# Patient Record
Sex: FEMALE | Race: WHITE | NOT HISPANIC OR LATINO | Employment: UNEMPLOYED | URBAN - METROPOLITAN AREA
[De-identification: names, ages, dates, MRNs, and addresses within clinical notes are randomized per-mention and may not be internally consistent; named-entity substitution may affect disease eponyms.]

---

## 2019-05-14 ENCOUNTER — TELEPHONE (OUTPATIENT)
Dept: SCHEDULING | Facility: IMAGING CENTER | Age: 24
End: 2019-05-14

## 2019-05-28 ENCOUNTER — HOSPITAL ENCOUNTER (OUTPATIENT)
Dept: LAB | Facility: MEDICAL CENTER | Age: 24
End: 2019-05-28
Attending: INTERNAL MEDICINE
Payer: COMMERCIAL

## 2019-05-28 ENCOUNTER — OFFICE VISIT (OUTPATIENT)
Dept: MEDICAL GROUP | Facility: MEDICAL CENTER | Age: 24
End: 2019-05-28
Attending: INTERNAL MEDICINE
Payer: COMMERCIAL

## 2019-05-28 VITALS
DIASTOLIC BLOOD PRESSURE: 68 MMHG | TEMPERATURE: 97.3 F | HEIGHT: 65 IN | OXYGEN SATURATION: 97 % | SYSTOLIC BLOOD PRESSURE: 110 MMHG | BODY MASS INDEX: 38.65 KG/M2 | RESPIRATION RATE: 16 BRPM | HEART RATE: 70 BPM | WEIGHT: 232 LBS

## 2019-05-28 DIAGNOSIS — E66.9 OBESITY (BMI 35.0-39.9 WITHOUT COMORBIDITY): ICD-10-CM

## 2019-05-28 DIAGNOSIS — F41.9 ANXIETY: ICD-10-CM

## 2019-05-28 DIAGNOSIS — L30.9 ECZEMA, UNSPECIFIED TYPE: ICD-10-CM

## 2019-05-28 DIAGNOSIS — E55.9 VITAMIN D DEFICIENCY: ICD-10-CM

## 2019-05-28 DIAGNOSIS — E11.9 TYPE 2 DIABETES MELLITUS WITHOUT COMPLICATION, WITHOUT LONG-TERM CURRENT USE OF INSULIN (HCC): ICD-10-CM

## 2019-05-28 DIAGNOSIS — F33.1 MODERATE EPISODE OF RECURRENT MAJOR DEPRESSIVE DISORDER (HCC): ICD-10-CM

## 2019-05-28 DIAGNOSIS — E28.2 PCOS (POLYCYSTIC OVARIAN SYNDROME): ICD-10-CM

## 2019-05-28 DIAGNOSIS — J45.20 MILD INTERMITTENT ASTHMA WITHOUT COMPLICATION: ICD-10-CM

## 2019-05-28 DIAGNOSIS — G56.03 BILATERAL CARPAL TUNNEL SYNDROME: ICD-10-CM

## 2019-05-28 DIAGNOSIS — E78.1 HYPERTRIGLYCERIDEMIA: ICD-10-CM

## 2019-05-28 DIAGNOSIS — K21.9 GASTROESOPHAGEAL REFLUX DISEASE, ESOPHAGITIS PRESENCE NOT SPECIFIED: ICD-10-CM

## 2019-05-28 LAB
CREAT UR-MCNC: 183.5 MG/DL
HBA1C MFR BLD: 7.8 % (ref 0–5.6)
INT CON NEG: NEGATIVE
INT CON POS: POSITIVE
MICROALBUMIN UR-MCNC: 1.9 MG/DL
MICROALBUMIN/CREAT UR: 10 MG/G (ref 0–30)

## 2019-05-28 PROCEDURE — 80053 COMPREHEN METABOLIC PANEL: CPT

## 2019-05-28 PROCEDURE — 99204 OFFICE O/P NEW MOD 45 MIN: CPT | Performed by: INTERNAL MEDICINE

## 2019-05-28 PROCEDURE — 83036 HEMOGLOBIN GLYCOSYLATED A1C: CPT | Performed by: INTERNAL MEDICINE

## 2019-05-28 PROCEDURE — 36415 COLL VENOUS BLD VENIPUNCTURE: CPT

## 2019-05-28 PROCEDURE — 82043 UR ALBUMIN QUANTITATIVE: CPT

## 2019-05-28 PROCEDURE — 82570 ASSAY OF URINE CREATININE: CPT

## 2019-05-28 PROCEDURE — 82306 VITAMIN D 25 HYDROXY: CPT

## 2019-05-28 PROCEDURE — 80061 LIPID PANEL: CPT

## 2019-05-28 RX ORDER — LANCETS 30 GAUGE
1 EACH MISCELLANEOUS DAILY
Qty: 100 EACH | Refills: 3 | Status: SHIPPED | OUTPATIENT
Start: 2019-05-28 | End: 2021-03-10

## 2019-05-28 RX ORDER — ALBUTEROL SULFATE 90 UG/1
2 AEROSOL, METERED RESPIRATORY (INHALATION) EVERY 6 HOURS PRN
Qty: 8.5 G | Refills: 3 | Status: SHIPPED | OUTPATIENT
Start: 2019-05-28 | End: 2021-03-10

## 2019-05-28 RX ORDER — RANITIDINE 150 MG/1
150 TABLET ORAL 2 TIMES DAILY PRN
Qty: 30 TAB | Refills: 3 | Status: SHIPPED | OUTPATIENT
Start: 2019-05-28 | End: 2021-03-10

## 2019-05-28 RX ORDER — METFORMIN HYDROCHLORIDE 500 MG/1
500 TABLET, EXTENDED RELEASE ORAL DAILY
Qty: 30 TAB | Refills: 5 | Status: SHIPPED | OUTPATIENT
Start: 2019-05-28 | End: 2021-03-10 | Stop reason: SDUPTHER

## 2019-05-28 ASSESSMENT — PAIN SCALES - GENERAL: PAINLEVEL: NO PAIN

## 2019-05-28 ASSESSMENT — PATIENT HEALTH QUESTIONNAIRE - PHQ9
CLINICAL INTERPRETATION OF PHQ2 SCORE: 2
5. POOR APPETITE OR OVEREATING: 1 - SEVERAL DAYS
SUM OF ALL RESPONSES TO PHQ QUESTIONS 1-9: 8

## 2019-05-28 NOTE — ASSESSMENT & PLAN NOTE
She reports a history of depression.  She states that a week ago, she was feeling suicidal and almost checked herself into the hospital.  She states that she can flip-flop easily.  Some days she will feel normal, like today, and the next day she will feel suicidal.  She currently denies suicidal ideation.  She is not taking any medication for her mental health currently.  She has never been formally evaluated by a psychiatrist.

## 2019-05-28 NOTE — ASSESSMENT & PLAN NOTE
She reports mild asthma symptoms and usually needs her inhaler about once a week.  She states that it has gotten worse since moving from [place] to [place].  She suffers from numerous allergies but does not know what specifically she is allergic to.  She is requesting allergy testing.

## 2019-05-28 NOTE — ASSESSMENT & PLAN NOTE
She states that in the past, she was taking vitamin D 50,000 units weekly.  She has been off of this for several months.

## 2019-05-28 NOTE — ASSESSMENT & PLAN NOTE
She reports daily heartburn symptoms.  She has been taking omeprazole 20 mg in the mornings.  States that this controls her symptoms well.  Has not been able to purchase it lately due to difficulty affording it.  She has also tried Tums in the past which did not help.  She has never tried ranitidine.

## 2019-05-28 NOTE — ASSESSMENT & PLAN NOTE
She reports dealing with severe anxiety for several years.  States that it is difficult for her to go out in public.  She gets a lot of social anxiety and sometimes can do shopping.  Her previous PCP started her on sertraline but she did not take this long enough to notice an improvement.  She has never seen psychiatry or therapist.  She has not tried any other medication besides the sertraline.

## 2019-05-28 NOTE — ASSESSMENT & PLAN NOTE
She reports a history of eczema develops intermittently in the groin region.  She has used hydrocortisone cream 2.5% in the past.  She is requesting a refill of this.  She does not use it daily.

## 2019-05-28 NOTE — PROGRESS NOTES
Anisha Tristan is a 24 y.o. female here for diabetes, anxiety, hyperlipidemia, depression, numerous chronic medical problems as below, establish care  HPI:    Anxiety  She reports dealing with severe anxiety for several years.  States that it is difficult for her to go out in public.  She gets a lot of social anxiety and sometimes can do shopping.  Her previous PCP started her on sertraline but she did not take this long enough to notice an improvement.  She has never seen psychiatry or therapist.  She has not tried any other medication besides the sertraline.    Mild intermittent asthma without complication  She reports mild asthma symptoms and usually needs her inhaler about once a week.  She states that it has gotten worse since moving from [place] to [place].  She suffers from numerous allergies but does not know what specifically she is allergic to.  She is requesting allergy testing.      GERD (gastroesophageal reflux disease)  She reports daily heartburn symptoms.  She has been taking omeprazole 20 mg in the mornings.  States that this controls her symptoms well.  Has not been able to purchase it lately due to difficulty affording it.  She has also tried Tums in the past which did not help.  She has never tried ranitidine.      Bilateral carpal tunnel syndrome  She has a history of bilateral carpal tunnel syndrome and has had surgery on both wrists.  She reports no improvement after surgery.  She reports persistent numbness and tingling in her hands that is worse in the mornings.  In the past she has used wrist braces which have helped.  She does not currently have them.  She has never tried carpal tunnel injections.  She denies significant hand weakness.    Moderate episode of recurrent major depressive disorder (HCC)  She reports a history of depression.  She states that a week ago, she was feeling suicidal and almost checked herself into the hospital.  She states that she can flip-flop easily.  Some days she  will feel normal, like today, and the next day she will feel suicidal.  She currently denies suicidal ideation.  She is not taking any medication for her mental health currently.  She has never been formally evaluated by a psychiatrist.    PCOS (polycystic ovarian syndrome)  She has a history of PCOS with difficulty with fertility.  She is interested in regulating her menstrual cycles and also becoming pregnant.  She states they have been trying for 5 years and have been unsuccessful.    Type 2 diabetes mellitus without complication, without long-term current use of insulin ([facility])  She was diagnosed with diabetes several years ago.  She was previously taking metformin 500 mg daily.  She has been off this medication for several months now.  She does not test blood sugars routinely.  She denies polyuria and polydipsia.    Hypertriglyceridemia  She states that her previous PCP had her on gemfibrozil 600 mg twice daily for this.  She has not been taking it recently.  She has not had lipid panel checked recently.  No history of pancreatitis.    Vitamin D deficiency  She states that in the past, she was taking vitamin D 50,000 units weekly.  She has been off of this for several months.    Obesity (BMI 35.0-39.9 without comorbidity) ([facility])  She reports difficulty losing weight related to inability to afford healthy foods.  She denies soda or fast food intake.  She tries to eat diabetic friendly foods but has difficulty affording these.  She has not been to any of the local food rosales.  She is interested in a prescription for diabetic food from the food rosales.    Eczema  She reports a history of eczema develops intermittently in the groin region.  She has used hydrocortisone cream 2.5% in the past.  She is requesting a refill of this.  She does not use it daily.    Current medicines (including changes today)  Current Outpatient Prescriptions   Medication Sig Dispense Refill   • metFORMIN ER (GLUCOPHAGE XR) 500 MG TABLET SR 24  HR Take 1 Tab by mouth every day. 30 Tab 5   • hydrocortisone 2.5 % Cream topical cream Apply thin layer to areas of eczema daily as needed 30 g 3   • albuterol 108 (90 Base) MCG/ACT Aero Soln inhalation aerosol Inhale 2 Puffs by mouth every 6 hours as needed for Shortness of Breath. 8.5 g 3   • Blood Glucose Monitoring Suppl (TRUE METRIX METER) w/Device Kit 1 Kit by Does not apply route Once for 1 dose. 1 Kit 0   • glucose blood strip Use to test blood sugar daily and as needed for symptoms of high or low sugar 50 Strip 5   • Lancets 1 Units by Does not apply route every day. Lancets order: Lancets for True Metrix meter. Sig: use 1 daily and prn ssx high or low sugar 100 Each 3   • raNITidine (ZANTAC) 150 MG Tab Take 1 Tab by mouth 2 times a day as needed for Heartburn. 30 Tab 3     No current facility-administered medications for this visit.      She  has a past medical history of Asthma; Carpal tunnel syndrome; Diabetes (HCC); Hyperlipidemia; PCOS (polycystic ovarian syndrome); and Vitamin D deficiency.  She  has a past surgical history that includes carpal tunnel release (Bilateral) and ankle orif (Right, 2010).  Social History   Substance Use Topics   • Smoking status: Former Smoker     Packs/day: 0.10     Years: 15.00     Types: Cigarettes     Quit date: 10/28/2018   • Smokeless tobacco: Never Used   • Alcohol use Yes      Comment: 1 glass of wine every 2 months     Social History     Social History Narrative   • No narrative on file     Family History   Problem Relation Age of Onset   • Cancer Mother         throat   • Alcohol/Drug Mother    • Cancer Maternal Uncle    • Diabetes Maternal Grandmother    • Hypertension Maternal Grandmother    • Heart Disease Neg Hx    • Stroke Neg Hx          ROS  As above in HPI  All other systems reviewed and are negative     Objective:     Vitals:    05/28/19 0756   BP: 110/68   Pulse: 70   Resp: 16   Temp: 36.3 °C (97.3 °F)   SpO2: 97%     Body mass index is 38.61  kg/m².  Physical Exam:    Constitutional: Alert, no distress.  Skin: Warm, dry, good turgor, no rashes in visible areas, hirsutism.  Eye: Equal, round and reactive, conjunctiva clear, lids normal.  ENMT: Lips without lesions, fair dentition, oropharynx clear, TM's clear bilaterally.  Neck: Trachea midline, no masses, no thyromegaly. No cervical or supraclavicular lymphadenopathy.  Respiratory: Unlabored respiratory effort, lungs clear to auscultation, no wheezes, no ronchi.  Cardiovascular: Regular rate and rhythm, no murmurs appreciated, no lower extremity edema.  Abdomen: Soft, non-tender, no masses, no hepatosplenomegaly.  Psych: Alert and oriented x3, normal affect and mood.  MSK: Positive Tinel and Phalen bilaterally, 5/5  strength bilateral    Results:  POC A1c in clinic today was 7.8    Assessment and Plan:   The following treatment plan was discussed    1. Type 2 diabetes mellitus without complication, without long-term current use of insulin (HCC)  She has been off of her medications for several months.  We will restart her previous metformin dose.  - metFORMIN ER (GLUCOPHAGE XR) 500 MG TABLET SR 24 HR; Take 1 Tab by mouth every day.  Dispense: 30 Tab; Refill: 5  - Blood Glucose Monitoring Suppl (TRUE METRIX METER) w/Device Kit; 1 Kit by Does not apply route Once for 1 dose.  Dispense: 1 Kit; Refill: 0  - glucose blood strip; Use to test blood sugar daily and as needed for symptoms of high or low sugar  Dispense: 50 Strip; Refill: 5  - Lancets; 1 Units by Does not apply route every day. Lancets order: Lancets for True Metrix meter. Sig: use 1 daily and prn ssx high or low sugar  Dispense: 100 Each; Refill: 3  - Lipid Profile; Future  - MICROALBUMIN CREAT RATIO URINE; Future  - Comp Metabolic Panel; Future  - POCT Hemoglobin A1C  -DM food prescription given    2. Anxiety  Uncontrolled, she would like to get involved with a therapist as well as a psychiatrist.  I have placed a referral today.  - REFERRAL  TO PSYCHIATRY  - REFERRAL TO PSYCHOLOGY    3. PCOS (polycystic ovarian syndrome)  We did not get to discuss this in detail today.  We did talk about OCPs as a means to control PCOS symptoms however given that she wants to become pregnant, would not recommend this at this time.  Could consider GYN referral in the future if she desires    4. Mild intermittent asthma without complication  Stable, controlled with current medications.  We will get her over to allergy so that she can get extensive allergy testing to try to improve her asthma symptoms.  - albuterol 108 (90 Base) MCG/ACT Aero Soln inhalation aerosol; Inhale 2 Puffs by mouth every 6 hours as needed for Shortness of Breath.  Dispense: 8.5 g; Refill: 3  - REFERRAL TO ALLERGY    5. Gastroesophageal reflux disease, esophagitis presence not specified  Was previously taking Prilosec daily.  We will switch her to ranitidine  - raNITidine (ZANTAC) 150 MG Tab; Take 1 Tab by mouth 2 times a day as needed for Heartburn.  Dispense: 30 Tab; Refill: 3    6. Moderate episode of recurrent major depressive disorder (HCC)  Uncontrolled.  Question whether her symptoms are solely due to depression versus other underlying psychiatric diagnosis.  Referral to psychiatry for assistance with correct diagnosis given atypical symptoms.  We will hold off on medication for now.  - REFERRAL TO PSYCHIATRY  - REFERRAL TO PSYCHOLOGY    7. Vitamin D deficiency  Currently off of supplementation  - VITAMIN D,25 HYDROXY; Future    8. Bilateral carpal tunnel syndrome  Uncontrolled, status post failed carpal tunnel surgery.  We will have her start doing wrist braces  -Bilateral wrist braces given today for nocturnal use    9. Hypertriglyceridemia  Currently off of gemfibrozil.  We will obtain updated labs.    10. Obesity (BMI 35.0-39.9 without comorbidity)  - Patient identified as having weight management issue.  Appropriate orders and counseling given.    11. Eczema, unspecified type  Currently  inactive.  We will refill her hydrocortisone to use as needed  - hydrocortisone 2.5 % Cream topical cream; Apply thin layer to areas of eczema daily as needed  Dispense: 30 g; Refill: 3        Followup: Return in about 4 weeks (around 6/25/2019), or if symptoms worsen or fail to improve, for diabetes, labs, insomnia, PCOS, carpal tunnel.

## 2019-05-28 NOTE — ASSESSMENT & PLAN NOTE
She has a history of bilateral carpal tunnel syndrome and has had surgery on both wrists.  She reports no improvement after surgery.  She reports persistent numbness and tingling in her hands that is worse in the mornings.  In the past she has used wrist braces which have helped.  She does not currently have them.  She has never tried carpal tunnel injections.  She denies significant hand weakness.

## 2019-05-28 NOTE — LETTER
[facility]  Lou Batres M.D.  [address]  [place] [address]  Fax: [phone]   Authorization for Release/Disclosure of   Protected Health Information   Name: ANISHA NUÑEZ : 1995 SSN: [id number]   Address: [address]  [place] [address] Phone:    [phone] (home)    I authorize the entity listed below to release/disclose the PHI below to:   [facility]/Lou Batres M.D. and Lou Batres M.D.   Provider or Entity Name:     Address   City, State, Zip   Phone:      Fax:     Reason for request: continuity of care   Information to be released:    [  ] LAST COLONOSCOPY,  including any PATH REPORT and follow-up  [  ] LAST FIT/COLOGUARD RESULT [  ] LAST DEXA  [  ] LAST MAMMOGRAM  [  ] LAST PAP  [  ] LAST LABS [  ] RETINA EXAM REPORT  [  ] IMMUNIZATION RECORDS  [  ] Release all info      [  ] Check here and initial the line next to each item to release ALL health information INCLUDING  _____ Care and treatment for drug and / or alcohol abuse  _____ HIV testing, infection status, or AIDS  _____ Genetic Testing    DATES OF SERVICE OR TIME PERIOD TO BE DISCLOSED: _____________  I understand and acknowledge that:  * This Authorization may be revoked at any time by you in writing, except if your health information has already been used or disclosed.  * Your health information that will be used or disclosed as a result of you signing this authorization could be re-disclosed by the recipient. If this occurs, your re-disclosed health information may no longer be protected by State or Federal laws.  * You may refuse to sign this Authorization. Your refusal will not affect your ability to obtain treatment.  * This Authorization becomes effective upon signing and will  on (date) __________.      If no date is indicated, this Authorization will  one (1) year from the signature date.    Name: Anisha Nuñez    Signature:   Date:     2019       PLEASE FAX REQUESTED RECORDS BACK  TO: [phone]

## 2019-05-28 NOTE — ASSESSMENT & PLAN NOTE
She states that her previous PCP had her on gemfibrozil 600 mg twice daily for this.  She has not been taking it recently.  She has not had lipid panel checked recently.  No history of pancreatitis.

## 2019-05-28 NOTE — ASSESSMENT & PLAN NOTE
She has a history of PCOS with difficulty with fertility.  She is interested in regulating her menstrual cycles and also becoming pregnant.  She states they have been trying for 5 years and have been unsuccessful.

## 2019-05-28 NOTE — ASSESSMENT & PLAN NOTE
She reports difficulty losing weight related to inability to afford healthy foods.  She denies soda or fast food intake.  She tries to eat diabetic friendly foods but has difficulty affording these.  She has not been to any of the local food rosales.  She is interested in a prescription for diabetic food from the food rosales.

## 2019-05-28 NOTE — ASSESSMENT & PLAN NOTE
She was diagnosed with diabetes several years ago.  She was previously taking metformin 500 mg daily.  She has been off this medication for several months now.  She does not test blood sugars routinely.  She denies polyuria and polydipsia.

## 2019-05-29 LAB
25(OH)D3 SERPL-MCNC: 12 NG/ML (ref 30–100)
ALBUMIN SERPL BCP-MCNC: 4.3 G/DL (ref 3.2–4.9)
ALBUMIN/GLOB SERPL: 1.4 G/DL
ALP SERPL-CCNC: 59 U/L (ref 30–99)
ALT SERPL-CCNC: 30 U/L (ref 2–50)
ANION GAP SERPL CALC-SCNC: 9 MMOL/L (ref 0–11.9)
AST SERPL-CCNC: 27 U/L (ref 12–45)
BILIRUB SERPL-MCNC: 0.5 MG/DL (ref 0.1–1.5)
BUN SERPL-MCNC: 10 MG/DL (ref 8–22)
CALCIUM SERPL-MCNC: 9.6 MG/DL (ref 8.5–10.5)
CHLORIDE SERPL-SCNC: 105 MMOL/L (ref 96–112)
CHOLEST SERPL-MCNC: 130 MG/DL (ref 100–199)
CO2 SERPL-SCNC: 24 MMOL/L (ref 20–33)
CREAT SERPL-MCNC: 0.7 MG/DL (ref 0.5–1.4)
FASTING STATUS PATIENT QL REPORTED: NORMAL
GLOBULIN SER CALC-MCNC: 3.1 G/DL (ref 1.9–3.5)
GLUCOSE SERPL-MCNC: 137 MG/DL (ref 65–99)
HDLC SERPL-MCNC: 26 MG/DL
LDLC SERPL CALC-MCNC: 30 MG/DL
POTASSIUM SERPL-SCNC: 4.2 MMOL/L (ref 3.6–5.5)
PROT SERPL-MCNC: 7.4 G/DL (ref 6–8.2)
SODIUM SERPL-SCNC: 138 MMOL/L (ref 135–145)
TRIGL SERPL-MCNC: 369 MG/DL (ref 0–149)

## 2019-05-29 RX ORDER — ERGOCALCIFEROL 1.25 MG/1
50000 CAPSULE ORAL
Qty: 4 CAP | Refills: 5 | Status: SHIPPED | OUTPATIENT
Start: 2019-05-29 | End: 2021-03-10

## 2019-05-30 ENCOUNTER — TELEPHONE (OUTPATIENT)
Dept: MEDICAL GROUP | Facility: MEDICAL CENTER | Age: 24
End: 2019-05-30

## 2019-05-30 NOTE — TELEPHONE ENCOUNTER
----- Message from Lou Batres M.D. sent at 5/29/2019  2:40 PM PDT -----  Please let patient know that we have received the results of her labs.  Her vitamin D level is still very low so I think we should restart her weekly vitamin D supplementation.  I have sent over a prescription for this to her pharmacy.  Kidney and liver function is normal, as was the screening test for protein in her urine.  Her triglycerides are still high but I do not think we have to put her back on the gemfibrozil at this point.  We will just keep an eye on them.  We can discuss any questions she has at her next follow-up.  Lou Batres M.D.

## 2019-06-23 ENCOUNTER — HOSPITAL ENCOUNTER (EMERGENCY)
Facility: MEDICAL CENTER | Age: 24
End: 2019-06-23
Attending: EMERGENCY MEDICINE
Payer: COMMERCIAL

## 2019-06-23 ENCOUNTER — APPOINTMENT (OUTPATIENT)
Dept: RADIOLOGY | Facility: MEDICAL CENTER | Age: 24
End: 2019-06-23
Attending: EMERGENCY MEDICINE
Payer: COMMERCIAL

## 2019-06-23 VITALS
BODY MASS INDEX: 37.79 KG/M2 | RESPIRATION RATE: 18 BRPM | OXYGEN SATURATION: 100 % | HEART RATE: 65 BPM | TEMPERATURE: 97 F | DIASTOLIC BLOOD PRESSURE: 80 MMHG | SYSTOLIC BLOOD PRESSURE: 114 MMHG | WEIGHT: 227.07 LBS

## 2019-06-23 DIAGNOSIS — J18.9 PNEUMONIA OF BOTH LOWER LOBES DUE TO INFECTIOUS ORGANISM: ICD-10-CM

## 2019-06-23 DIAGNOSIS — J02.9 PHARYNGITIS, UNSPECIFIED ETIOLOGY: ICD-10-CM

## 2019-06-23 DIAGNOSIS — J40 BRONCHITIS: ICD-10-CM

## 2019-06-23 LAB
GLUCOSE BLD-MCNC: 164 MG/DL (ref 65–99)
S PYO DNA SPEC NAA+PROBE: NOT DETECTED

## 2019-06-23 PROCEDURE — A9270 NON-COVERED ITEM OR SERVICE: HCPCS | Performed by: EMERGENCY MEDICINE

## 2019-06-23 PROCEDURE — 700101 HCHG RX REV CODE 250: Performed by: EMERGENCY MEDICINE

## 2019-06-23 PROCEDURE — 99284 EMERGENCY DEPT VISIT MOD MDM: CPT

## 2019-06-23 PROCEDURE — 82962 GLUCOSE BLOOD TEST: CPT

## 2019-06-23 PROCEDURE — 87651 STREP A DNA AMP PROBE: CPT

## 2019-06-23 PROCEDURE — 700102 HCHG RX REV CODE 250 W/ 637 OVERRIDE(OP): Performed by: EMERGENCY MEDICINE

## 2019-06-23 PROCEDURE — 71046 X-RAY EXAM CHEST 2 VIEWS: CPT

## 2019-06-23 PROCEDURE — 94640 AIRWAY INHALATION TREATMENT: CPT

## 2019-06-23 RX ORDER — ALBUTEROL SULFATE 90 UG/1
2 AEROSOL, METERED RESPIRATORY (INHALATION) EVERY 4 HOURS PRN
Qty: 1 INHALER | Refills: 0 | Status: SHIPPED | OUTPATIENT
Start: 2019-06-23 | End: 2021-03-10

## 2019-06-23 RX ORDER — AZITHROMYCIN 250 MG/1
500 TABLET, FILM COATED ORAL ONCE
Status: COMPLETED | OUTPATIENT
Start: 2019-06-23 | End: 2019-06-23

## 2019-06-23 RX ORDER — AZITHROMYCIN 250 MG/1
250 TABLET, FILM COATED ORAL DAILY
Qty: 1 QUANTITY SUFFICIENT | Refills: 0 | Status: SHIPPED | OUTPATIENT
Start: 2019-06-23 | End: 2019-06-27

## 2019-06-23 RX ORDER — CEFUROXIME AXETIL 500 MG/1
500 TABLET ORAL 2 TIMES DAILY
Qty: 10 TAB | Refills: 0 | Status: SHIPPED | OUTPATIENT
Start: 2019-06-23 | End: 2019-06-28

## 2019-06-23 RX ORDER — DEXAMETHASONE 4 MG/1
12 TABLET ORAL ONCE
Status: COMPLETED | OUTPATIENT
Start: 2019-06-23 | End: 2019-06-23

## 2019-06-23 RX ADMIN — ALBUTEROL SULFATE 2.5 MG: 2.5 SOLUTION RESPIRATORY (INHALATION) at 06:37

## 2019-06-23 RX ADMIN — IPRATROPIUM BROMIDE 0.5 MG: 0.5 SOLUTION RESPIRATORY (INHALATION) at 06:37

## 2019-06-23 RX ADMIN — AZITHROMYCIN 500 MG: 250 TABLET, FILM COATED ORAL at 05:51

## 2019-06-23 RX ADMIN — DEXAMETHASONE 12 MG: 4 TABLET ORAL at 05:51

## 2019-06-23 ASSESSMENT — ENCOUNTER SYMPTOMS
SORE THROAT: 1
HEADACHES: 0
SHORTNESS OF BREATH: 0
ABDOMINAL PAIN: 0
CHILLS: 0
SPUTUM PRODUCTION: 0
BACK PAIN: 0
COUGH: 1
FEVER: 0
VOMITING: 0

## 2019-06-23 ASSESSMENT — LIFESTYLE VARIABLES: EVER_SMOKED: YES

## 2019-06-23 NOTE — ED PROVIDER NOTES
"ED Provider Note    ED Provider Note    Primary care provider: Lou Batres M.D.  Means of arrival: POV  History obtained from: Patient  History limited by: NOne    CHIEF COMPLAINT  Chief Complaint   Patient presents with   • Cough     \"I've been sick with a cold for three weeks. I have a persistant cough, it gets 100x worse when I lay down and I am tired of not being able to sleep.\" Pt denies taking anything to relieve cough.        LILLIE Tristan is a 24 y.o. female who presents to the Emergency Department with a chief complaint of a cough.  Patient states she has had a cough for 3 weeks.  She reports is nonproductive.  She has not had a fever.  She reports prior similar symptoms in [place] with URI but it seemed to go away it seemed to resolve on its own.  She moved to [place] 4 months ago.  She is lived here in the past for various periods.  Asthma symptoms or allergy symptoms increase in renal compared to her other residences.  She does not smoke.  She is a history of diabetes and asthma.  She takes metformin daily.  She states she has not recently been checking her blood sugars because she has been \"cheating\".  Symptoms seem to be worse at night.  She also complains of a sore throat.  She denies any pain.  She states occasionally she will PE secondary to stress incontinence with coughing and occasionally, she will gag if she is having a coughing fit.    REVIEW OF SYSTEMS  Review of Systems   Constitutional: Negative for chills and fever.   HENT: Positive for sore throat.    Respiratory: Positive for cough. Negative for sputum production and shortness of breath.    Cardiovascular: Negative for chest pain.   Gastrointestinal: Negative for abdominal pain and vomiting.   Genitourinary: Negative for dysuria.   Musculoskeletal: Negative for back pain.   Skin: Negative for rash.   Neurological: Negative for headaches.   All other systems reviewed and are negative.      PAST MEDICAL HISTORY   has a past " medical history of ASTHMA; Asthma; Carpal tunnel syndrome; Diabetes (HCC); Hyperlipidemia; Morbid obesity (HCC); PCOS (polycystic ovarian syndrome); and Vitamin D deficiency.    SURGICAL HISTORY   has a past surgical history that includes eye surgery; open reduction; carpal tunnel release (Bilateral); and ankle orif (Right, 2010).    SOCIAL HISTORY  Social History   Substance Use Topics   • Smoking status: Former Smoker     Packs/day: 0.10     Years: 15.00     Types: Cigarettes     Quit date: 10/28/2018   • Smokeless tobacco: Never Used   • Alcohol use Yes      Comment: 1 glass of wine every 2 months      History   Drug Use No     Comment: history of meth from [id number], no IV       FAMILY HISTORY  Family History   Problem Relation Age of Onset   • Cancer Mother         throat   • Alcohol/Drug Mother    • Cancer Maternal Uncle    • Diabetes Maternal Grandmother    • Hypertension Maternal Grandmother    • Heart Disease Neg Hx    • Stroke Neg Hx        CURRENT MEDICATIONS  Home Medications    **Home medications have not yet been reviewed for this encounter**         ALLERGIES  Allergies   Allergen Reactions   • Norco [Apap-Fd&C Blue #1-Hydrocodone] Vomiting   • Norco [Hydrocodone-Acetaminophen] Vomiting       PHYSICAL EXAM  VITAL SIGNS: /80   Pulse 65   Temp 36.1 °C (97 °F) (Temporal)   Resp 18   Wt 103 kg (227 lb 1.2 oz)   SpO2 100%   BMI 37.79 kg/m²   Vitals reviewed.  Constitutional: Patient is oriented to person, place, and time. Appears well-developed and well-nourished. No distress.    Head: Normocephalic and atraumatic.  Hirsutism  Ears: Normal external ears bilaterally.   Mouth/Throat: Oropharynx is clear and moist, no exudates.   Eyes: Conjunctivae are normal. Pupils are equal, round, and reactive to light.   Neck: Normal range of motion. Neck supple.  Cardiovascular: Normal rate, regular rhythm and normal heart sounds. Normal peripheral pulses.  Pulmonary/Chest: Effort normal and breath sounds  normal. No respiratory distress, no wheezes, rhonchi, or rales. No chest wall tenderness.  Abdominal: Soft. Bowel sounds are normal. There is no tenderness. No rebound or guarding, or peritoneal signs. No CVA tenderness.  Truncal obesity.  Musculoskeletal: No edema and no tenderness.   Neurological: No focal deficits.   Skin: Skin is warm and dry. No erythema. No pallor.   Psychiatric: Patient has a normal mood and affect.     RADIOLOGY  DX-CHEST-2 VIEWS   Final Result         1.  Hazy pulmonary opacities suggests subtle infiltrate.        The radiologist's interpretation of all radiological studies have been reviewed by me.    COURSE & MEDICAL DECISION MAKING  Pertinent Labs & Imaging studies reviewed. (See chart for details)    Obtained and reviewed past medical records.  Patient's last encounter was an outpatient visit with Dr. Batres May 28.  She is history of diabetes, anxiety, hyperlipidemia, depression she was there to establish care.  During that visit, she is noted to have mild asthma and need to use her inhaler about once per week.  She states it worsens at night and reported that it was worse since moving to [facility].    5:31 AM - Patient seen and examined at bedside.  Patient presents with history of cough for 3 weeks, nonproductive without fever.  She demonstrates no increased work of breathing.  She does have a dry cough noted here on exam.  She reports being immunized as a child.  Despite documentation and report at her outpatient visit with her PCP noting worsening symptoms and renal, patient denied this to me.  She is not wheezing at this time.  Chest x-ray ordered by triage ERP.    Differential diagnosis includes but not limited to: Pneumonia, bronchitis, URI, uncontrolled diabetes.    5:44 AM, x-ray reviewed.  Findings suggestive of subtle pneumonia.  At this time, I do not feel lab evaluation would change her management.  Patient is not febrile or tachypneic, tachycardic or hypoxic.   She will be treated with antibiotics here in the ED.  Awaiting rapid strep and Accu-Chek.  Patient updated on plan of care.    She is reevaluated at the bedside.  We discussed negative rapid strep testing and Accu-Chek of 160s.  At this time, I feel she can safely be discharged home.  She is given home antibiotics as well as a first dose here in the ED.  She is well-appearing and nontoxic with normal vital signs.  To be discharged home in stable condition.    FINAL IMPRESSION  1. Pneumonia of both lower lobes due to infectious organism (HCC)    2. Bronchitis    3. Pharyngitis, unspecified etiology

## 2019-06-23 NOTE — DISCHARGE INSTRUCTIONS
It is important to take your antibiotics as directed.  Please follow-up with your primary care doctor.  MDI inhaler as needed.

## 2019-06-23 NOTE — ED NOTES
Discharging patient home. Verbalized understanding of discharge instructions, follow up appointments, and home care. All questions answered and all personal belongings with patient at time of discharge. Vital signs WNL. Patient given discharge information papers, prescriptions and discharge assessment completed.     Pt to lobby with steady gait.

## 2019-06-23 NOTE — ED TRIAGE NOTES
"Anisha Tristan  24 y.o. female  Chief Complaint   Patient presents with   • Cough     \"I've been sick with a cold for three weeks. I have a persistant cough, it gets 100x worse when I lay down and I am tired of not being able to sleep.\" Pt denies taking anything to relieve cough.        Pt amb to triage with steady gait for above complaint.     Pt is alert and oriented, speaking in full sentences, follows commands and responds appropriately to questions. Resp are even and unlabored. No behavioral indicators of pain.   Pt placed in lobby. Pt educated on triage process. Pt encouraged to alert staff for any changes.    "

## 2019-06-27 ENCOUNTER — TELEPHONE (OUTPATIENT)
Dept: MEDICAL GROUP | Facility: MEDICAL CENTER | Age: 24
End: 2019-06-27

## 2019-06-27 NOTE — TELEPHONE ENCOUNTER
Spoke with patient about no show to appointment today 6/27/19.  Explained this was her first no show and the no show policy.

## 2019-06-28 ENCOUNTER — OFFICE VISIT (OUTPATIENT)
Dept: MEDICAL GROUP | Facility: MEDICAL CENTER | Age: 24
End: 2019-06-28
Attending: INTERNAL MEDICINE
Payer: COMMERCIAL

## 2019-06-28 VITALS
TEMPERATURE: 97.9 F | HEIGHT: 65 IN | SYSTOLIC BLOOD PRESSURE: 100 MMHG | BODY MASS INDEX: 37.49 KG/M2 | HEART RATE: 86 BPM | DIASTOLIC BLOOD PRESSURE: 70 MMHG | RESPIRATION RATE: 16 BRPM | OXYGEN SATURATION: 94 % | WEIGHT: 225 LBS

## 2019-06-28 DIAGNOSIS — K52.9 CHRONIC DIARRHEA: ICD-10-CM

## 2019-06-28 DIAGNOSIS — K80.20 GALLSTONES: ICD-10-CM

## 2019-06-28 DIAGNOSIS — E11.9 TYPE 2 DIABETES MELLITUS WITHOUT COMPLICATION, WITHOUT LONG-TERM CURRENT USE OF INSULIN (HCC): ICD-10-CM

## 2019-06-28 DIAGNOSIS — G56.03 BILATERAL CARPAL TUNNEL SYNDROME: ICD-10-CM

## 2019-06-28 DIAGNOSIS — D22.9 NEVUS: ICD-10-CM

## 2019-06-28 PROBLEM — L98.9 SKIN LESION: Status: ACTIVE | Noted: 2019-06-28

## 2019-06-28 PROCEDURE — 99213 OFFICE O/P EST LOW 20 MIN: CPT | Performed by: INTERNAL MEDICINE

## 2019-06-28 PROCEDURE — 99214 OFFICE O/P EST MOD 30 MIN: CPT | Performed by: INTERNAL MEDICINE

## 2019-06-28 RX ORDER — LOPERAMIDE HYDROCHLORIDE 2 MG/1
2 CAPSULE ORAL 4 TIMES DAILY PRN
Qty: 60 CAP | Refills: 3 | Status: SHIPPED | OUTPATIENT
Start: 2019-06-28 | End: 2021-03-10 | Stop reason: SDUPTHER

## 2019-06-28 RX ORDER — DICYCLOMINE HCL 20 MG
20 TABLET ORAL
Qty: 30 TAB | Refills: 0 | Status: SHIPPED | OUTPATIENT
Start: 2019-06-28 | End: 2021-03-10 | Stop reason: SDUPTHER

## 2019-06-28 ASSESSMENT — PAIN SCALES - GENERAL: PAINLEVEL: NO PAIN

## 2019-06-28 NOTE — ASSESSMENT & PLAN NOTE
She has a long history of carpal tunnel syndrome initially diagnosed several years ago.  She had an EMG at the time of diagnosis which confirmed it.  She was seen an orthopedist in [place] who then did bilateral carpal tunnel releases.  She reports relief in her pain for several weeks after the surgery however the pain has recurred and been bothersome for the past year.  At her last visit, we gave her wrist braces which she has started wearing at night.  She reports improvement in her nocturnal symptoms but she still having a lot of pain during the day.  She works as a  so she cannot wear the braces all the time.  She is interested in trying carpal tunnel injections if possible.

## 2019-06-28 NOTE — ASSESSMENT & PLAN NOTE
She presents today for follow-up on her diabetes.  1 month ago, we started her on her metformin again.  She is taking 500 mg extended release.  Her A1c prior to starting was 7.8.  She has been occasionally checking her blood sugars at home and reports that they are usually in the 120-160 range after eating.  She does not bring in her record today.  She states they have been similar in the morning when she wakes up.

## 2019-06-28 NOTE — ASSESSMENT & PLAN NOTE
She has a raised dark nevus in her left axilla that she states is been growing.  It is darker than all of her other moles and so she is been concerned about it.  It is not itchy or bleeding.  It does rub on her bra but it is not painful.  She has no personal history of skin cancer.

## 2019-06-28 NOTE — ASSESSMENT & PLAN NOTE
She reports that in the past, when they were working her up for the diarrhea and the chronic abdominal pain, she did have a right upper quadrant ultrasound which showed gallstones.  This was done in [place].  We do not have these records for review.  States that she never saw a surgeon and they did not suggest that she have a cholecystectomy.

## 2019-06-28 NOTE — ASSESSMENT & PLAN NOTE
She reports several years of chronic diarrhea as well as diffuse abdominal pain.  In the past it was thought that she might have IBS.  She was evaluated by a GI doctor in [place] and had a colonoscopy about 1-1/2 years ago according to the patient.  She states that it showed hemorrhoids and a couple of polyps but nothing was cancerous.  She denies any history of ulcerative colitis, Crohn's disease, or other inflammatory bowel disease.  She states that the diarrhea has especially been acting up over the past week and a half.  She did not notice it at the start of her Metformin.  She states she is been eating out a lot lately but she has not noticed any specific food triggers.  She is been avoiding dairy.  She is been taking over-the-counter tablets a day to help with the diarrhea.  She states that she has a small bowel movement every time she urinates and it is always loose.  She denies any melena or hematochezia.  When she has a larger bowel movement, she will have some diffuse cramping with it.

## 2019-06-28 NOTE — PROGRESS NOTES
Subjective:   Anisha Tristan is a 24 y.o. female here today for abdominal pain, chronic diarrhea, follow-up diabetes and carpal tunnel syndrome, new mole    Chronic diarrhea  She reports several years of chronic diarrhea as well as diffuse abdominal pain.  In the past it was thought that she might have IBS.  She was evaluated by a GI doctor in [place] and had a colonoscopy about 1-1/2 years ago according to the patient.  She states that it showed hemorrhoids and a couple of polyps but nothing was cancerous.  She denies any history of ulcerative colitis, Crohn's disease, or other inflammatory bowel disease.  She states that the diarrhea has especially been acting up over the past week and a half.  She did not notice it at the start of her Metformin.  She states she is been eating out a lot lately but she has not noticed any specific food triggers.  She is been avoiding dairy.  She is been taking over-the-counter tablets a day to help with the diarrhea.  She states that she has a small bowel movement every time she urinates and it is always loose.  She denies any melena or hematochezia.  When she has a larger bowel movement, she will have some diffuse cramping with it.    Type 2 diabetes mellitus without complication, without long-term current use of insulin (HCC)  She presents today for follow-up on her diabetes.  1 month ago, we started her on her metformin again.  She is taking 500 mg extended release.  Her A1c prior to starting was 7.8.  She has been occasionally checking her blood sugars at home and reports that they are usually in the 120-160 range after eating.  She does not bring in her record today.  She states they have been similar in the morning when she wakes up.      Bilateral carpal tunnel syndrome  She has a long history of carpal tunnel syndrome initially diagnosed several years ago.  She had an EMG at the time of diagnosis which confirmed it.  She was seen an orthopedist in [place] who then did  bilateral carpal tunnel releases.  She reports relief in her pain for several weeks after the surgery however the pain has recurred and been bothersome for the past year.  At her last visit, we gave her wrist braces which she has started wearing at night.  She reports improvement in her nocturnal symptoms but she still having a lot of pain during the day.  She works as a  so she cannot wear the braces all the time.  She is interested in trying carpal tunnel injections if possible.    Nevus  She has a raised dark nevus in her left axilla that she states is been growing.  It is darker than all of her other moles and so she is been concerned about it.  It is not itchy or bleeding.  It does rub on her bra but it is not painful.  She has no personal history of skin cancer.    Gallstones  She reports that in the past, when they were working her up for the diarrhea and the chronic abdominal pain, she did have a right upper quadrant ultrasound which showed gallstones.  This was done in [place].  We do not have these records for review.  States that she never saw a surgeon and they did not suggest that she have a cholecystectomy.         Current medicines (including changes today)  Current Outpatient Prescriptions   Medication Sig Dispense Refill   • loperamide (IMODIUM) 2 MG Cap Take 1 Cap by mouth 4 times a day as needed for Diarrhea. 60 Cap 3   • dicyclomine (BENTYL) 20 MG Tab Take 1 Tab by mouth 3 times a day before meals. 30 Tab 0   • cefUROXime (CEFTIN) 500 MG Tab Take 1 Tab by mouth 2 times a day for 5 days. 10 Tab 0   • vitamin D, Ergocalciferol, (DRISDOL) 96336 units Cap capsule Take 1 Cap by mouth every 7 days. 4 Cap 5   • metFORMIN ER (GLUCOPHAGE XR) 500 MG TABLET SR 24 HR Take 1 Tab by mouth every day. 30 Tab 5   • hydrocortisone 2.5 % Cream topical cream Apply thin layer to areas of eczema daily as needed 30 g 3   • glucose blood strip Use to test blood sugar daily and as needed for symptoms of high or  low sugar 50 Strip 5   • Lancets 1 Units by Does not apply route every day. Lancets order: Lancets for True Metrix meter. Sig: use 1 daily and prn ssx high or low sugar 100 Each 3   • raNITidine (ZANTAC) 150 MG Tab Take 1 Tab by mouth 2 times a day as needed for Heartburn. 30 Tab 3   • Cholecalciferol (VITAMIN D3) 5000 UNITS CAPS Take 1 Cap by mouth every day.     • albuterol 108 (90 Base) MCG/ACT Aero Soln inhalation aerosol Inhale 2 Puffs by mouth every four hours as needed. 1 Inhaler 0   • albuterol 108 (90 Base) MCG/ACT Aero Soln inhalation aerosol Inhale 2 Puffs by mouth every 6 hours as needed for Shortness of Breath. 8.5 g 3     No current facility-administered medications for this visit.      She  has a past medical history of ASTHMA; Asthma; Carpal tunnel syndrome; Diabetes (HCC); Hyperlipidemia; Morbid obesity (HCC); PCOS (polycystic ovarian syndrome); and Vitamin D deficiency.    ROS   Denies chest pain, shortness of breath  As above in HPI     Objective:     Vitals:    06/28/19 0844   BP: 100/70   Pulse: 86   Resp: 16   Temp: 36.6 °C (97.9 °F)   SpO2: 94%     Body mass index is 37.44 kg/m².   Physical Exam:  Constitutional: Alert, no distress.  Skin: Warm, dry, good turgor, no rashes in visible areas, very darkly pigmented nevus in left axilla that is raised, about the size of a pencil eraser, nontender.  Eye: Equal, round and reactive, conjunctiva clear, lids normal.  Abdomen: Soft, mild tenderness to palpation in left upper quadrant and periumbilical region, no masses, no hepatosplenomegaly.    Results and Imaging:   Lab Results   Component Value Date/Time    CHOLSTRLTOT 130 05/28/2019 10:09 AM    LDL 30 05/28/2019 10:09 AM    HDL 26 (A) 05/28/2019 10:09 AM    TRIGLYCERIDE 369 (H) 05/28/2019 10:09 AM       Lab Results   Component Value Date/Time    SODIUM 138 05/28/2019 10:09 AM    POTASSIUM 4.2 05/28/2019 10:09 AM    CHLORIDE 105 05/28/2019 10:09 AM    CO2 24 05/28/2019 10:09 AM    GLUCOSE 137 (H)  05/28/2019 10:09 AM    BUN 10 05/28/2019 10:09 AM    CREATININE 0.70 05/28/2019 10:09 AM     Lab Results   Component Value Date/Time    ALKPHOSPHAT 59 05/28/2019 10:09 AM    ASTSGOT 27 05/28/2019 10:09 AM    ALTSGPT 30 05/28/2019 10:09 AM    TBILIRUBIN 0.5 05/28/2019 10:09 AM      Ref. Range 5/28/2019 10:08 5/28/2019 10:09   Micro Alb Creat Ratio Latest Ref Range: 0 - 30 mg/g 10    Creatinine, Urine Latest Units: mg/dL 183.50    Microalbumin, Urine Random Latest Units: mg/dL 1.9    25-Hydroxy   Vitamin D 25 Latest Ref Range: 30 - 100 ng/mL  12 (L)         Assessment and Plan:   The following treatment plan was discussed    1. Chronic diarrhea  She reports a normal colonoscopy 1.5 years ago.  She does not correlate the symptoms to the start of her Metformin.  Question whether she has underlying IBS.  She has not identified any food triggers.  She is not having any bleeding in her bowel movements.  We will try her on a combination of Imodium and dicyclomine as below.  Also discussed increasing the fiber in her diet, using Metamucil or Benefiber.  If no improvement could refer to GI  - loperamide (IMODIUM) 2 MG Cap; Take 1 Cap by mouth 4 times a day as needed for Diarrhea.  Dispense: 60 Cap; Refill: 3  - dicyclomine (BENTYL) 20 MG Tab; Take 1 Tab by mouth 3 times a day before meals.  Dispense: 30 Tab; Refill: 0  -Start fiber supplement  -If no improvement with above treatment will refer to GI    2. Gallstones  She does not seem symptomatic from the gallstones at this point.  She does not get much right upper quadrant pain related to fatty foods however if we are unable to improve her abdominal pain and have ruled out other options, could consider reevaluating gallstones as cause    3. Nevus  Given size, growth, and hyperpigmentation as well as the location of the nevus causing irritation, we have elected to remove it.  She will return to clinic at her convenience for removal procedure  -Patient to return for  removal    4. Type 2 diabetes mellitus without complication, without long-term current use of insulin (HCC)  She continues on the metformin.  -Metformin 500 mg extended release daily  -Continue home blood sugar testing  -Repeat A1c in August    5. Bilateral carpal tunnel syndrome  Some improvement with bilateral wrist braces.  She has already had carpal tunnel releases.  Would consider carpal tunnel injections.  We will refer her to orthopedics for further assistance with this.  - REFERRAL TO ORTHOPEDICS        Followup: Return in about 3 weeks (around 7/19/2019), or if symptoms worsen or fail to improve, for mole removal.

## 2019-07-03 ENCOUNTER — OFFICE VISIT (OUTPATIENT)
Dept: MEDICAL GROUP | Facility: MEDICAL CENTER | Age: 24
End: 2019-07-03
Attending: FAMILY MEDICINE
Payer: COMMERCIAL

## 2019-07-03 VITALS
OXYGEN SATURATION: 96 % | TEMPERATURE: 97.1 F | HEIGHT: 65 IN | HEART RATE: 88 BPM | BODY MASS INDEX: 37.99 KG/M2 | WEIGHT: 228 LBS | RESPIRATION RATE: 16 BRPM | SYSTOLIC BLOOD PRESSURE: 102 MMHG | DIASTOLIC BLOOD PRESSURE: 60 MMHG

## 2019-07-03 DIAGNOSIS — B37.31 YEAST VAGINITIS: ICD-10-CM

## 2019-07-03 PROCEDURE — 99213 OFFICE O/P EST LOW 20 MIN: CPT | Performed by: FAMILY MEDICINE

## 2019-07-03 PROCEDURE — 99212 OFFICE O/P EST SF 10 MIN: CPT | Performed by: FAMILY MEDICINE

## 2019-07-03 RX ORDER — FLUCONAZOLE 150 MG/1
150 TABLET ORAL DAILY
Qty: 2 TAB | Refills: 0 | Status: SHIPPED
Start: 2019-07-03 | End: 2021-03-10

## 2019-07-03 NOTE — PROGRESS NOTES
"Subjective:      Anisha Tristan is a 24 y.o. female who presents with Vaginitis            HPI 1.  Patient reports onset 4 days ago of intense vaginal pruritus along with thicker than normal whitish vaginal discharge.  She just finished taking Zithromax and Ceftin about 6 days ago.  She is suspicious of a vaginal yeast infection, which she had one year ago as well.    ROS negative for dysuria, constipation, diarrhea, fever       Objective:     /60 (BP Location: Left arm, Patient Position: Sitting, BP Cuff Size: Large adult)   Pulse 88   Temp 36.2 °C (97.1 °F) (Temporal)   Resp 16   Ht 1.651 m (5' 5\")   Wt 103.4 kg (228 lb)   SpO2 96%   BMI 37.94 kg/m²      Physical Exam  Gen.- alert, cooperative, in no acute distress  Neck- midline trachea, thyroid not enlarged or tender,supple, no cervical adenopathy  Chest-clear to auscultation and percussion with normal breath sounds. No retractions. Chest wall nontender  Cardiac- regular rhythm and rate. No murmur, thrill, or heave  Abdomen-Abdomen is soft, nontender, normal bowel sounds, no masses, guarding, or organomegaly.            Assessment/Plan:     1. Yeast vaginitis      1.  Will treat empirically with Diflucan 150 mg 1 tablet daily x2 days  2.  Follow-up as needed  "

## 2019-09-23 ENCOUNTER — OFFICE VISIT (OUTPATIENT)
Dept: MEDICAL GROUP | Facility: MEDICAL CENTER | Age: 24
End: 2019-09-23
Attending: INTERNAL MEDICINE
Payer: COMMERCIAL

## 2019-09-23 VITALS
OXYGEN SATURATION: 98 % | TEMPERATURE: 97.6 F | DIASTOLIC BLOOD PRESSURE: 70 MMHG | RESPIRATION RATE: 16 BRPM | SYSTOLIC BLOOD PRESSURE: 120 MMHG | BODY MASS INDEX: 37.32 KG/M2 | HEIGHT: 65 IN | WEIGHT: 224 LBS | HEART RATE: 92 BPM

## 2019-09-23 DIAGNOSIS — Z23 FLU VACCINE NEED: ICD-10-CM

## 2019-09-23 DIAGNOSIS — E11.9 TYPE 2 DIABETES MELLITUS WITHOUT COMPLICATION, WITHOUT LONG-TERM CURRENT USE OF INSULIN (HCC): ICD-10-CM

## 2019-09-23 DIAGNOSIS — N97.0 INFERTILITY ASSOCIATED WITH ANOVULATION: ICD-10-CM

## 2019-09-23 DIAGNOSIS — N94.10 DYSPAREUNIA IN FEMALE: ICD-10-CM

## 2019-09-23 DIAGNOSIS — E66.9 OBESITY (BMI 35.0-39.9 WITHOUT COMORBIDITY): ICD-10-CM

## 2019-09-23 PROCEDURE — 90686 IIV4 VACC NO PRSV 0.5 ML IM: CPT | Performed by: FAMILY MEDICINE

## 2019-09-23 PROCEDURE — 99213 OFFICE O/P EST LOW 20 MIN: CPT | Performed by: INTERNAL MEDICINE

## 2019-09-23 PROCEDURE — 99214 OFFICE O/P EST MOD 30 MIN: CPT | Performed by: FAMILY MEDICINE

## 2019-09-23 RX ORDER — ARIPIPRAZOLE 5 MG/1
5 TABLET ORAL
Refills: 0 | COMMUNITY
Start: 2019-07-15 | End: 2021-03-10

## 2019-09-23 RX ORDER — PRAZOSIN HYDROCHLORIDE 1 MG/1
1 CAPSULE ORAL
Refills: 1 | COMMUNITY
Start: 2019-07-15 | End: 2021-03-10

## 2019-09-23 NOTE — ASSESSMENT & PLAN NOTE
Patient has been  for 6 years and not using any contraception.  They have not had any pregnancies or miscarriages.  Patient's  has gotten his partner pregnant.  Patient reports that she did have a pelvic ultrasound 1 to 2 years ago in [place] which was negative for cystic change around either ovary or apparently any other abnormality.

## 2019-09-23 NOTE — PROGRESS NOTES
Chief Complaint   Patient presents with   • Establish Care       HISTORY OF PRESENT ILLNESS: Patient is a 24 y.o. female established patient who presents today to transfer primary care and discuss the problems below        Dyspareunia in female  Patient reports progressively increased feelings of pain with intercourse over the past 2 years.    Infertility associated with anovulation  Patient has been  for 6 years and not using any contraception.  They have not had any pregnancies or miscarriages.  Patient's  has gotten his partner pregnant.  Patient reports that she did have a pelvic ultrasound 1 to 2 years ago in [place] which was negative for cystic change around either ovary or apparently any other abnormality.    Type 2 diabetes mellitus without complication, without long-term current use of insulin ([facility])  Patient reports that she is having difficulty remembering to take her metformin extended release 500 mg daily.  She actually reports that she is had no doses in the past month other than today and it was very scattered prior to that as well.  She has only occasionally been checking blood sugars to check this morning before breakfast and it was 162.  Last A1c, May 2019, was 7.8.  Patient is not routinely expensing low blood sugars characterized by headache or diaphoresis.  Has not had an ophthalmology exam.    Social history-, not working  Patient Active Problem List    Diagnosis Date Noted   • Infertility associated with anovulation 09/23/2019   • Dyspareunia in female 09/23/2019   • Chronic diarrhea 06/28/2019   • Gallstones 06/28/2019   • Nevus 06/28/2019   • Type 2 diabetes mellitus without complication, without long-term current use of insulin ([facility]) 05/28/2019   • Anxiety 05/28/2019   • PCOS (polycystic ovarian syndrome) 05/28/2019   • Mild intermittent asthma without complication 05/28/2019   • GERD (gastroesophageal reflux disease) 05/28/2019   • Moderate episode of recurrent  major depressive disorder ([facility]) 05/28/2019   • Bilateral carpal tunnel syndrome 05/28/2019   • Hypertriglyceridemia 05/28/2019   • Vitamin D deficiency 05/28/2019   • Obesity (BMI 35.0-39.9 without comorbidity) ([facility]) 05/28/2019   • Eczema 05/28/2019       Allergies:Abilify; Norco [apap-fd&c blue #1-hydrocodone]; and Norco [hydrocodone-acetaminophen]    Current Outpatient Medications   Medication Sig Dispense Refill   • fluconazole (DIFLUCAN) 150 MG tablet Take 1 Tab by mouth every day. 2 Tab 0   • loperamide (IMODIUM) 2 MG Cap Take 1 Cap by mouth 4 times a day as needed for Diarrhea. 60 Cap 3   • dicyclomine (BENTYL) 20 MG Tab Take 1 Tab by mouth 3 times a day before meals. 30 Tab 0   • albuterol 108 (90 Base) MCG/ACT Aero Soln inhalation aerosol Inhale 2 Puffs by mouth every four hours as needed. 1 Inhaler 0   • vitamin D, Ergocalciferol, (DRISDOL) 34428 units Cap capsule Take 1 Cap by mouth every 7 days. 4 Cap 5   • metFORMIN ER (GLUCOPHAGE XR) 500 MG TABLET SR 24 HR Take 1 Tab by mouth every day. 30 Tab 5   • hydrocortisone 2.5 % Cream topical cream Apply thin layer to areas of eczema daily as needed 30 g 3   • albuterol 108 (90 Base) MCG/ACT Aero Soln inhalation aerosol Inhale 2 Puffs by mouth every 6 hours as needed for Shortness of Breath. 8.5 g 3   • glucose blood strip Use to test blood sugar daily and as needed for symptoms of high or low sugar 50 Strip 5   • Lancets 1 Units by Does not apply route every day. Lancets order: Lancets for True Metrix meter. Sig: use 1 daily and prn ssx high or low sugar 100 Each 3   • raNITidine (ZANTAC) 150 MG Tab Take 1 Tab by mouth 2 times a day as needed for Heartburn. 30 Tab 3   • Cholecalciferol (VITAMIN D3) 5000 UNITS CAPS Take 1 Cap by mouth every day.       No current facility-administered medications for this visit.        Social History     Tobacco Use   • Smoking status: Former Smoker     Packs/day: 0.10     Years: 15.00     Pack years: 1.50     Types: Cigarettes  "    Last attempt to quit: 10/28/2018     Years since quittin.9   • Smokeless tobacco: Never Used   Substance Use Topics   • Alcohol use: Yes     Comment: 1 glass of wine every 2 months   • Drug use: No     Comment: history of meth from [id number], no IV       Family History   Problem Relation Age of Onset   • Cancer Mother         throat   • Alcohol/Drug Mother    • Cancer Maternal Uncle    • Diabetes Maternal Grandmother    • Hypertension Maternal Grandmother    • Heart Disease Neg Hx    • Stroke Neg Hx        ROS:  Review of Systems   Constitutional: Negative for fever, chills, weight loss and malaise/fatigue.   Eyes: Negative for blurred vision.   Respiratory: Negative for cough, sputum production, shortness of breath and wheezing.    Cardiovascular: Negative for chest pain, palpitations, orthopnea and leg swelling.   Gastrointestinal: Negative for heartburn, nausea, vomiting and abdominal pain. .   Endo/Heme/Allergies: Does not bruise/bleed easily.               Exam:  /70 (BP Location: Right arm, Patient Position: Sitting, BP Cuff Size: Adult)   Pulse 92   Temp 36.4 °C (97.6 °F) (Temporal)   Resp 16   Ht 1.651 m (5' 5\")   Wt 101.6 kg (224 lb)   SpO2 98%   General:  Well nourished, well developed female in NAD  Head is grossly normal.  Neck: Supple without JVD or bruit. Thyroid is not enlarged. Trachea is midline.  Pulmonary: Clear to ausculation .  Normal effort. No rales, ronchi, or wheezing.  Cardiovascular: Regular rate and rhythm without murmur.  Abdomen-Abdomen is soft, normal bowel sounds, no masses, guarding, ororganomegaly, or tenderness.  Lower extremities- neg for pretibial edema, redness, tenderness.      Please note that this dictation was created using voice recognition software. I have made every reasonable attempt to correct obvious errors, but I expect that there are errors of grammar and possibly content that I did not discover before finalizing the note.    Assessment/Plan:  1. " Type 2 diabetes mellitus without complication, without long-term current use of insulin (HCC)  MICROALBUMIN CREAT RATIO URINE    HEMOGLOBIN A1C    Comp Metabolic Panel    REFERRAL TO OPHTHALMOLOGY   2. Obesity (BMI 35.0-39.9 without comorbidity) (HCC)  TSH   3. Infertility associated with anovulation  REFERRAL TO GYNECOLOGY   4. Dyspareunia in female  REFERRAL TO GYNECOLOGY   5. Flu vaccine need  Influenza Vaccine Quad Injection (PF)     Plan: 1.  GYN referral for infertility, dyspareunia, amenorrhea  2.  Ophthalmology diabetes retinal exam  3.  Update TSH, A1c, CMP, urine microalbumin  4.  Revisit with me 4 to 6 weeks

## 2019-09-23 NOTE — ASSESSMENT & PLAN NOTE
Patient reports that she is having difficulty remembering to take her metformin extended release 500 mg daily.  She has only occasionally been checking blood sugars to check this morning before breakfast and it was 162.  Last A1c, May 2019, was 7.8.  Patient is not routinely expensing low blood sugars characterized by headache or diaphoresis.  Has not had an ophthalmology exam.

## 2019-10-28 ENCOUNTER — TELEPHONE (OUTPATIENT)
Dept: MEDICAL GROUP | Facility: MEDICAL CENTER | Age: 24
End: 2019-10-28

## 2019-10-28 NOTE — TELEPHONE ENCOUNTER
Left message with patient about no show to appointment today 10/28/19.  Explained this was her 2nd no show and the no show policy.

## 2020-08-10 ENCOUNTER — HOSPITAL ENCOUNTER (EMERGENCY)
Facility: MEDICAL CENTER | Age: 25
End: 2020-08-10
Attending: EMERGENCY MEDICINE

## 2020-08-10 VITALS
SYSTOLIC BLOOD PRESSURE: 110 MMHG | HEART RATE: 80 BPM | WEIGHT: 228.18 LBS | TEMPERATURE: 97.8 F | RESPIRATION RATE: 16 BRPM | DIASTOLIC BLOOD PRESSURE: 77 MMHG | OXYGEN SATURATION: 96 % | HEIGHT: 61 IN | BODY MASS INDEX: 43.08 KG/M2

## 2020-08-10 DIAGNOSIS — L73.9 FOLLICULITIS: ICD-10-CM

## 2020-08-10 PROCEDURE — 99282 EMERGENCY DEPT VISIT SF MDM: CPT

## 2020-08-10 RX ORDER — CEPHALEXIN 500 MG/1
500 CAPSULE ORAL 4 TIMES DAILY
Qty: 20 CAP | Refills: 0 | Status: SHIPPED | OUTPATIENT
Start: 2020-08-10 | End: 2020-08-15

## 2020-08-10 ASSESSMENT — ENCOUNTER SYMPTOMS: FEVER: 0

## 2020-08-10 NOTE — ED TRIAGE NOTES
"Chief Complaint   Patient presents with   • Bump     Pt reports \"bumps\" to right groin area that started one week ago, they have increased over the last week. Reports itchy, some have crusted over.        Pt amb to triage with steady gait for above complaint.   Pt is alert and oriented, speaking in full sentences, follows commands and responds appropriately to questions. NAD. Resp are even and unlabored.  Pt placed in lobby. Pt educated on triage process. Pt encouraged to alert staff for any changes.    Vitals:    08/10/20 0323   BP: 109/74   Pulse: 79   Resp: 16   Temp: 36.2 °C (97.2 °F)   SpO2: 96%       "

## 2020-08-10 NOTE — ED PROVIDER NOTES
"ED Provider Note    ED Provider Note    Primary care provider: Og Hawley M.D.  Means of arrival: POV  History obtained from: patient  History limited by: None    CHIEF COMPLAINT  Chief Complaint   Patient presents with   • Bump     Pt reports \"bumps\" to right groin area that started one week ago, they have increased over the last week. Reports itchy, some have crusted over.        LILLIE Tristan is a 25 y.o. female who presents to the Emergency Department with a chief complaint of bumps along her right hip.  They are located along the course of her underwear line.  She notes no change in her recent activities other than wearing pants more frequently, when she typically would wear dresses.  No fever or systemic symptoms.  She noticed them several days ago.  She is diabetic.  She reports itching and irritation to the area.  No known bug bites.  Patient states she is here at this hour in the emergency department because her  was coming to be seen and she \"figured she would come as well\"    REVIEW OF SYSTEMS  Review of Systems   Constitutional: Negative for fever.   Musculoskeletal: Negative for joint pain.   Skin: Positive for itching and rash.       PAST MEDICAL HISTORY   has a past medical history of ASTHMA, Asthma, Carpal tunnel syndrome, Diabetes (HCC), Hyperlipidemia, Morbid obesity (HCC), PCOS (polycystic ovarian syndrome), and Vitamin D deficiency.    SURGICAL HISTORY   has a past surgical history that includes eye surgery; open reduction; carpal tunnel release (Bilateral); and ankle orif (Right, ).    SOCIAL HISTORY  Social History     Tobacco Use   • Smoking status: Current Every Day Smoker     Packs/day: 0.10     Years: 15.00     Pack years: 1.50     Types: Cigarettes     Last attempt to quit: 10/28/2018     Years since quittin.7   • Smokeless tobacco: Never Used   Substance Use Topics   • Alcohol use: Yes     Comment: 1 glass of wine every 2 months   • Drug use: No     Comment: " "history of meth from 0405-9274, no IV      Social History     Substance and Sexual Activity   Drug Use No    Comment: history of meth from 6442-4057, no IV       FAMILY HISTORY  Family History   Problem Relation Age of Onset   • Cancer Mother         throat   • Alcohol/Drug Mother    • Cancer Maternal Uncle    • Diabetes Maternal Grandmother    • Hypertension Maternal Grandmother    • Heart Disease Neg Hx    • Stroke Neg Hx        CURRENT MEDICATIONS  Home Medications     Reviewed by Lisa Grier R.N. (Registered Nurse) on 08/10/20 at 0335  Med List Status: Not Addressed   Medication Last Dose Status   albuterol 108 (90 Base) MCG/ACT Aero Soln inhalation aerosol  Active   albuterol 108 (90 Base) MCG/ACT Aero Soln inhalation aerosol  Active   aripiprazole (ABILIFY) 5 MG tablet  Active   Cholecalciferol (VITAMIN D3) 5000 UNITS CAPS  Active   dicyclomine (BENTYL) 20 MG Tab  Active   fluconazole (DIFLUCAN) 150 MG tablet  Active   glucose blood strip  Active   hydrocortisone 2.5 % Cream topical cream  Active   Lancets  Active   loperamide (IMODIUM) 2 MG Cap  Active   metFORMIN ER (GLUCOPHAGE XR) 500 MG TABLET SR 24 HR  Active   prazosin (MINIPRESS) 1 MG Cap  Active   raNITidine (ZANTAC) 150 MG Tab  Active   vitamin D, Ergocalciferol, (DRISDOL) 58095 units Cap capsule  Active                ALLERGIES  Allergies   Allergen Reactions   • Abilify Unspecified     Pt states that the medication affects her mobility, room starts spinning, unable to walk or use hands or feet, black outs   • Norco [Apap-Fd&C Blue #1-Hydrocodone] Vomiting   • Norco [Hydrocodone-Acetaminophen] Vomiting       PHYSICAL EXAM  VITAL SIGNS: /77   Pulse 80   Temp 36.6 °C (97.8 °F) (Temporal)   Resp 16   Ht 1.549 m (5' 1\")   Wt 103.5 kg (228 lb 2.8 oz)   SpO2 96%   BMI 43.11 kg/m²   Vitals reviewed.  Constitutional: Patient is oriented to person, place, and time. Appears well-developed and well-nourished. No distress.    Head: Normocephalic " "and atraumatic.   Eyes: Conjunctivae are normal.   Neck: Normal range of motion.   Cardiovascular: Normal rate, regular rhythm and normal heart sounds.   Pulmonary/Chest: Effort normal and breath sounds normal. No respiratory distress, no wheezes,  Abdominal: Soft. Bowel sounds are normal. There is no tenderness.  Musculoskeletal: No edema  Neurological: No focal deficits.   Skin: Skin is warm and dry. No erythema. No pallor.  Over the right hip, there are approximately 7, erythematous slightly raised lesions, the largest of which is 3 mm, there is no surrounding induration or cellulitic changes more than 3 or 4 mm.  No area of fluctuance.  Psychiatric: Patient has a normal mood and affect.     COURSE & MEDICAL DECISION MAKING  Pertinent Labs & Imaging studies reviewed. (See chart for details)    Obtained and reviewed past medical records.  Last encounter, is from September 2019, patient was seen in the outpatient setting to establish care.  At that time, patient had problems noted associated with infertility.  She is a nondiabetic.  She was having pain with intercourse.  Other active problems include chronic diarrhea, gallstones, PCOS, asthma, GERD, carpal tunnel syndrome, hypertriglyceridemia, obesity, eczema and nevi.  Last ED visit, patient was seen by myself in June 2019 with a cough.  X-ray at that time, showed hazy pulmonary opacities suggestive of infiltrate.    4:11 AM - Patient seen and examined at bedside.  Patient presents because her  was begun to be checked out in the emergency department and she \"figured that she would, as well\".  She does not have any systemic symptoms.  Her exam is consistent with possibly bug bites, possibly a localized folliculitis.  No area that will require incision and drainage at this time.  There is no significant cellulitis or confluence of these lesions.  Given that she is a diabetic, obese, smoker with skin folds to the area that may impede healing.  I have advised " application of a warm moist compress 2-3 times per day, watching the area.  If the redness worsens, I would suggest starting antibiotics.  Otherwise, I suspect she may not need them.  She is given strict return precautions.  She is well-appearing and nontoxic.  She will be discharged home in stable condition.    FINAL IMPRESSION  1. Folliculitis

## 2020-09-08 ENCOUNTER — NON-PROVIDER VISIT (OUTPATIENT)
Dept: OCCUPATIONAL MEDICINE | Facility: CLINIC | Age: 25
End: 2020-09-08

## 2020-09-08 DIAGNOSIS — Z02.1 PRE-EMPLOYMENT DRUG SCREENING: ICD-10-CM

## 2020-09-08 DIAGNOSIS — Z02.1 PRE-EMPLOYMENT HEALTH SCREENING EXAMINATION: ICD-10-CM

## 2020-09-08 LAB
AMP AMPHETAMINE: NORMAL
COC COCAINE: NORMAL
INT CON NEG: NORMAL
INT CON POS: NORMAL
MET METHAMPHETAMINES: NORMAL
OPI OPIATES: NORMAL
PCP PHENCYCLIDINE: NORMAL
POC DRUG COMMENT 753798-OCCUPATIONAL HEALTH: NEGATIVE
THC: NORMAL

## 2020-09-08 PROCEDURE — 80305 DRUG TEST PRSMV DIR OPT OBS: CPT | Performed by: PREVENTIVE MEDICINE

## 2021-03-10 ENCOUNTER — OFFICE VISIT (OUTPATIENT)
Dept: MEDICAL GROUP | Facility: MEDICAL CENTER | Age: 26
End: 2021-03-10
Attending: FAMILY MEDICINE
Payer: COMMERCIAL

## 2021-03-10 ENCOUNTER — HOSPITAL ENCOUNTER (OUTPATIENT)
Dept: LAB | Facility: MEDICAL CENTER | Age: 26
End: 2021-03-10
Attending: FAMILY MEDICINE
Payer: COMMERCIAL

## 2021-03-10 VITALS
WEIGHT: 214.6 LBS | RESPIRATION RATE: 16 BRPM | DIASTOLIC BLOOD PRESSURE: 70 MMHG | HEART RATE: 70 BPM | OXYGEN SATURATION: 96 % | HEIGHT: 61 IN | TEMPERATURE: 97 F | BODY MASS INDEX: 40.52 KG/M2 | SYSTOLIC BLOOD PRESSURE: 100 MMHG

## 2021-03-10 DIAGNOSIS — F33.1 MODERATE EPISODE OF RECURRENT MAJOR DEPRESSIVE DISORDER (HCC): ICD-10-CM

## 2021-03-10 DIAGNOSIS — L30.9 ECZEMA, UNSPECIFIED TYPE: ICD-10-CM

## 2021-03-10 DIAGNOSIS — E11.9 TYPE 2 DIABETES MELLITUS WITHOUT COMPLICATION, WITHOUT LONG-TERM CURRENT USE OF INSULIN (HCC): ICD-10-CM

## 2021-03-10 DIAGNOSIS — Z30.011 ENCOUNTER FOR INITIAL PRESCRIPTION OF CONTRACEPTIVE PILLS: ICD-10-CM

## 2021-03-10 DIAGNOSIS — K21.9 GASTROESOPHAGEAL REFLUX DISEASE, UNSPECIFIED WHETHER ESOPHAGITIS PRESENT: ICD-10-CM

## 2021-03-10 DIAGNOSIS — J45.20 MILD INTERMITTENT ASTHMA WITHOUT COMPLICATION: ICD-10-CM

## 2021-03-10 DIAGNOSIS — E28.2 PCOS (POLYCYSTIC OVARIAN SYNDROME): ICD-10-CM

## 2021-03-10 DIAGNOSIS — K52.9 CHRONIC DIARRHEA: ICD-10-CM

## 2021-03-10 PROBLEM — E66.01 CLASS 3 SEVERE OBESITY DUE TO EXCESS CALORIES WITH SERIOUS COMORBIDITY AND BODY MASS INDEX (BMI) OF 40.0 TO 44.9 IN ADULT (HCC): Status: ACTIVE | Noted: 2019-05-28

## 2021-03-10 PROBLEM — E66.813 CLASS 3 SEVERE OBESITY DUE TO EXCESS CALORIES WITH SERIOUS COMORBIDITY AND BODY MASS INDEX (BMI) OF 40.0 TO 44.9 IN ADULT (HCC): Status: ACTIVE | Noted: 2019-05-28

## 2021-03-10 LAB
ALBUMIN SERPL BCP-MCNC: 4.4 G/DL (ref 3.2–4.9)
ALBUMIN/GLOB SERPL: 1.3 G/DL
ALP SERPL-CCNC: 62 U/L (ref 30–99)
ALT SERPL-CCNC: 22 U/L (ref 2–50)
ANION GAP SERPL CALC-SCNC: 13 MMOL/L (ref 7–16)
AST SERPL-CCNC: 15 U/L (ref 12–45)
BILIRUB SERPL-MCNC: 0.4 MG/DL (ref 0.1–1.5)
BUN SERPL-MCNC: 12 MG/DL (ref 8–22)
CALCIUM SERPL-MCNC: 9.6 MG/DL (ref 8.5–10.5)
CHLORIDE SERPL-SCNC: 98 MMOL/L (ref 96–112)
CHOLEST SERPL-MCNC: 148 MG/DL (ref 100–199)
CO2 SERPL-SCNC: 25 MMOL/L (ref 20–33)
CREAT SERPL-MCNC: 0.54 MG/DL (ref 0.5–1.4)
CREAT UR-MCNC: 270.29 MG/DL
ERYTHROCYTE [DISTWIDTH] IN BLOOD BY AUTOMATED COUNT: 46.3 FL (ref 35.9–50)
FASTING STATUS PATIENT QL REPORTED: NORMAL
GLOBULIN SER CALC-MCNC: 3.3 G/DL (ref 1.9–3.5)
GLUCOSE SERPL-MCNC: 184 MG/DL (ref 65–99)
HBA1C MFR BLD: 8.5 % (ref 0–5.6)
HCT VFR BLD AUTO: 44.7 % (ref 37–47)
HDLC SERPL-MCNC: 25 MG/DL
HGB BLD-MCNC: 15 G/DL (ref 12–16)
INT CON NEG: NEGATIVE
INT CON POS: POSITIVE
LDLC SERPL CALC-MCNC: ABNORMAL MG/DL
MCH RBC QN AUTO: 30.7 PG (ref 27–33)
MCHC RBC AUTO-ENTMCNC: 33.6 G/DL (ref 33.6–35)
MCV RBC AUTO: 91.6 FL (ref 81.4–97.8)
MICROALBUMIN UR-MCNC: 3.3 MG/DL
MICROALBUMIN/CREAT UR: 12 MG/G (ref 0–30)
PLATELET # BLD AUTO: 264 K/UL (ref 164–446)
PMV BLD AUTO: 11.4 FL (ref 9–12.9)
POTASSIUM SERPL-SCNC: 4.2 MMOL/L (ref 3.6–5.5)
PROT SERPL-MCNC: 7.7 G/DL (ref 6–8.2)
RBC # BLD AUTO: 4.88 M/UL (ref 4.2–5.4)
SODIUM SERPL-SCNC: 136 MMOL/L (ref 135–145)
TRIGL SERPL-MCNC: 478 MG/DL (ref 0–149)
TSH SERPL DL<=0.005 MIU/L-ACNC: 3.18 UIU/ML (ref 0.38–5.33)
WBC # BLD AUTO: 7.9 K/UL (ref 4.8–10.8)

## 2021-03-10 PROCEDURE — 36415 COLL VENOUS BLD VENIPUNCTURE: CPT

## 2021-03-10 PROCEDURE — 83036 HEMOGLOBIN GLYCOSYLATED A1C: CPT | Performed by: FAMILY MEDICINE

## 2021-03-10 PROCEDURE — 99214 OFFICE O/P EST MOD 30 MIN: CPT | Performed by: FAMILY MEDICINE

## 2021-03-10 PROCEDURE — 80053 COMPREHEN METABOLIC PANEL: CPT

## 2021-03-10 PROCEDURE — 80061 LIPID PANEL: CPT

## 2021-03-10 PROCEDURE — 84443 ASSAY THYROID STIM HORMONE: CPT

## 2021-03-10 PROCEDURE — 99204 OFFICE O/P NEW MOD 45 MIN: CPT | Performed by: FAMILY MEDICINE

## 2021-03-10 PROCEDURE — 82570 ASSAY OF URINE CREATININE: CPT

## 2021-03-10 PROCEDURE — 82043 UR ALBUMIN QUANTITATIVE: CPT

## 2021-03-10 PROCEDURE — 85027 COMPLETE CBC AUTOMATED: CPT

## 2021-03-10 RX ORDER — ALBUTEROL SULFATE 90 UG/1
2 AEROSOL, METERED RESPIRATORY (INHALATION) EVERY 6 HOURS PRN
Qty: 8.5 G | Refills: 5 | Status: SHIPPED | OUTPATIENT
Start: 2021-03-10 | End: 2023-11-10 | Stop reason: SDUPTHER

## 2021-03-10 RX ORDER — FAMOTIDINE 20 MG/1
20 TABLET, FILM COATED ORAL 2 TIMES DAILY
Qty: 60 TABLET | Refills: 2 | Status: SHIPPED | OUTPATIENT
Start: 2021-03-10 | End: 2022-02-04

## 2021-03-10 RX ORDER — METFORMIN HYDROCHLORIDE 500 MG/1
500 TABLET, EXTENDED RELEASE ORAL DAILY
Qty: 30 TABLET | Refills: 5 | Status: SHIPPED | OUTPATIENT
Start: 2021-03-10 | End: 2023-11-10 | Stop reason: SDUPTHER

## 2021-03-10 RX ORDER — DICYCLOMINE HCL 20 MG
20 TABLET ORAL
Qty: 30 TABLET | Refills: 5 | Status: SHIPPED | OUTPATIENT
Start: 2021-03-10 | End: 2023-11-10 | Stop reason: SDUPTHER

## 2021-03-10 RX ORDER — LANCETS 30 GAUGE
EACH MISCELLANEOUS
Qty: 100 EACH | Refills: 5 | Status: SHIPPED | OUTPATIENT
Start: 2021-03-10 | End: 2024-02-16 | Stop reason: SDUPTHER

## 2021-03-10 RX ORDER — GLUCOSAMINE HCL/CHONDROITIN SU 500-400 MG
CAPSULE ORAL
Qty: 100 EACH | Refills: 5 | Status: SHIPPED | OUTPATIENT
Start: 2021-03-10 | End: 2023-11-10

## 2021-03-10 RX ORDER — DULAGLUTIDE 0.75 MG/.5ML
0.5 INJECTION, SOLUTION SUBCUTANEOUS
Qty: 1.96 ML | Refills: 2 | Status: SHIPPED | OUTPATIENT
Start: 2021-03-10 | End: 2023-11-10

## 2021-03-10 RX ORDER — NORETHINDRONE ACETATE AND ETHINYL ESTRADIOL .03; 1.5 MG/1; MG/1
1 TABLET ORAL DAILY
Qty: 28 TABLET | Refills: 11 | Status: SHIPPED | OUTPATIENT
Start: 2021-03-10 | End: 2023-11-10

## 2021-03-10 RX ORDER — LOPERAMIDE HYDROCHLORIDE 2 MG/1
2 CAPSULE ORAL
Qty: 30 CAPSULE | Refills: 2 | Status: SHIPPED | OUTPATIENT
Start: 2021-03-10 | End: 2023-11-10

## 2021-03-10 ASSESSMENT — PATIENT HEALTH QUESTIONNAIRE - PHQ9
CLINICAL INTERPRETATION OF PHQ2 SCORE: 2
5. POOR APPETITE OR OVEREATING: 3 - NEARLY EVERY DAY
SUM OF ALL RESPONSES TO PHQ QUESTIONS 1-9: 14

## 2021-03-10 NOTE — ASSESSMENT & PLAN NOTE
A1c today = 8.5  Has only taken metformin xr in the past    Diet - admits is indescriminate, high carb. Gravitates towards pastas, mashed potatoes, bread  Needs new glucometer  Metformin didn't make her chronic diarrhea worse

## 2021-03-10 NOTE — PROGRESS NOTES
"Subjective:     CC:    Chief Complaint   Patient presents with   • Establish Care   • Medication Refill     wants to talk about birth control        HISTORY OF THE PRESENT ILLNESS:  Subjective:     CC:    Chief Complaint   Patient presents with   • Establish Care   • Medication Refill     wants to talk about birth control        HISTORY OF THE PRESENT ILLNESS: Patient is a 25 y.o. female. This pleasant patient is here today to establish care and discuss the following issues.   His/her prior PCP was Dr. Cristian Hawley.    Moderate episode of recurrent major depressive disorder (HCC)  Was on abilify and prazosin previously - she states that \"I cannot function\"    She states that she was on sertraline previously and did well on it, however unknown if she was also on other mood stabilizing medications.     Patient states that there are periods of time when she felt great and decided to go shopping and spent 2020, but probably only lasted a day. She states she doesn't sleep well normally, only sleeps a few hours at a time.     No Si/HI currently    Depression Screening    Little interest or pleasure in doing things?  1 - several days   Feeling down, depressed , or hopeless? 1 - several days   Trouble falling or staying asleep, or sleeping too much?  3 - nearly every day   Feeling tired or having little energy?  3 - nearly every day   Poor appetite or overeating?  3 - nearly every day   Feeling bad about yourself - or that you are a failure or have let yourself or your family down? 1 - several days   Trouble concentrating on things, such as reading the newspaper or watching television? 1 - several days   Moving or speaking so slowly that other people could have noticed.  Or the opposite - being so fidgety or restless that you have been moving around a lot more than usual?  0 - not at all   Thoughts that you would be better off dead, or of hurting yourself?  1 - several days   Patient Health Questionnaire Score: 14 "               Type 2 diabetes mellitus without complication, without long-term current use of insulin ([facility])  A1c today = 8.5  Has only taken metformin xr in the past    Diet - admits is indescriminate, high carb. Gravitates towards pastas, mashed potatoes, bread  Needs new glucometer  Metformin didn't make her chronic diarrhea worse       Eczema  Gets flares on the R elbow (flexural) and vagina    PCOS (polycystic ovarian syndrome)  Cycles once q2-3 months, last couple years twice a year  About 15 lbs weight loss since 08/2020 - cut out soda entirely, tries to avoid sugary drinks, eats out less than she used to, walks to work daily (5 min walk)  Does have significant pain with cycles - lower abdomen to entire abdomen  7 days of heavy flow - 7-10 pads or tampons in a day   Recently bleeding on and off for 2 days at a time for 1 month  She is also concerned about endometriosis     Encounter for initial prescription of contraceptive pills  - Patient is sexually active and interested in starting birth control. In the past, she has used: loestrin.   - Family or personal history of clotting disorders, PEs, or DVTs: none   - Hx of migraines: none  - Contraindications? none      Mild intermittent asthma without complication  Patient states that she rarely needs her albuterol inhaler, may be twice a year.      Allergies: Abilify, Norco [apap-fd&c blue #1-hydrocodone], and Norco [hydrocodone-acetaminophen]    Current Outpatient Medications Ordered in Epic   Medication Sig Dispense Refill   • albuterol (PROVENTIL HFA) 108 (90 Base) MCG/ACT Aero Soln inhalation aerosol Inhale 2 Puffs every 6 hours as needed for Shortness of Breath. 8.5 g 5   • dicyclomine (BENTYL) 20 MG Tab Take 1 tablet by mouth 3 times a day before meals. 30 tablet 5   • loperamide (IMODIUM) 2 MG Cap Take 1 capsule by mouth 1 time a day as needed for Diarrhea. 30 capsule 2   • metFORMIN ER (GLUCOPHAGE XR) 500 MG TABLET SR 24 HR Take 1 tablet by mouth every  day. 30 tablet 5   • famotidine (PEPCID) 20 MG Tab Take 1 tablet by mouth 2 times a day. 60 tablet 2   • hydrocortisone 2.5 % Cream topical cream Apply thin layer to areas of eczema daily as needed 30 g 3   • Norethindrone Acet-Ethinyl Est (LOESTRIN 1.5, ,) 1.5-30 MG-MCG Tab Take 1 tablet by mouth every day. 28 tablet 11   • Dulaglutide (TRULICITY) 0.75 MG/0.5ML Solution Pen-injector Inject 0.5 mL under the skin every 7 days. 1.96 mL 2   • Blood Glucose Meter Kit Test blood sugar as recommended by provider. 1 Kit 0   • Blood Glucose Test Strips Test glucoses daily in the morning fasting 100 Each 5   • Lancets Use one to check glucoses daily morning before breakfast 100 Each 5   • Alcohol Swabs Wipe site with prep pad prior to injection. 100 Each 5     No current Epic-ordered facility-administered medications on file.       Past Medical History:   Diagnosis Date   • ASTHMA    • Asthma    • Carpal tunnel syndrome    • Diabetes (HCC)    • Hyperlipidemia    • Morbid obesity (HCC)    • PCOS (polycystic ovarian syndrome)    • Vitamin D deficiency        Past Surgical History:   Procedure Laterality Date   • ANKLE ORIF Right    • CARPAL TUNNEL RELEASE Bilateral    • EYE SURGERY     • OPEN REDUCTION         Social History     Tobacco Use   • Smoking status: Former Smoker     Packs/day: 0.25     Years: 15.00     Pack years: 3.75     Types: Cigarettes     Quit date: 2/15/2021     Years since quittin.0   • Smokeless tobacco: Never Used   Substance Use Topics   • Alcohol use: Yes     Comment: 1/2 bottle of wine every 2 months   • Drug use: Not Currently     Types: Methamphetamines, Inhaled     Comment: history of meth from 1492-4403, no IV, one use a couple months ago        Social History     Social History Narrative    ** Merged History Encounter **            Family History   Problem Relation Age of Onset   • Cancer Mother         throat   • Alcohol/Drug Mother    • Cancer Maternal Uncle    • Diabetes Maternal  "Grandmother    • Hypertension Maternal Grandmother    • Heart Disease Neg Hx    • Stroke Neg Hx        ROS:   Pulm: no sob, no cough  CV: no chest pain, no lower extremity edema  GI: no abd pain  : Pelvic pain          Objective:     Exam: /70 (BP Location: Left arm, Patient Position: Sitting, BP Cuff Size: Adult)   Pulse 70   Temp 36.1 °C (97 °F) (Temporal)   Resp 16   Ht 1.549 m (5' 1\")   Wt 97.3 kg (214 lb 9.6 oz)   SpO2 96%  Body mass index is 40.55 kg/m².    General: Normal appearing. No distress.  Head: normocephalic  Eyes:  Eyes conjunctiva clear lids without ptosis  ENT: Ears normal shape and contour  Neck: Supple. Thyroid is not enlarged.  Facial hair of the chin noted  Pulmonary: Clear to ausculation.  Normal effort. No rales, ronchi, or wheezing.  Cardiovascular: Regular rate and rhythm without murmur. Radial pulses are intact and equal bilaterally.  Abdomen: Soft, nontender, nondistended. Normal bowel sounds.  Neurologic: no facial droop, gait normal  Lymph: No cervical or supraclavicular lymph nodes are palpable  Skin: Warm and dry.  No obvious lesions.  Musculoskeletal: Normal gait. No extremity cyanosis, clubbing, or edema.  Psych: Normal mood and affect. Alert and oriented x3. Judgment and insight is normal.      Labs:   Last A1c 5/20/2019-7.8  Last lipids 5/2019-elevated triglycerides 369, LDL normal, HDL 26    Assessment & Plan:   25 y.o. female with the following -    1. Type 2 diabetes mellitus without complication, without long-term current use of insulin ([facility])  - POCT  A1C  - metFORMIN ER (GLUCOPHAGE XR) 500 MG TABLET SR 24 HR; Take 1 tablet by mouth every day.  Dispense: 30 tablet; Refill: 5  - Dulaglutide (TRULICITY) 0.75 MG/0.5ML Solution Pen-injector; Inject 0.5 mL under the skin every 7 days.  Dispense: 1.96 mL; Refill: 2  - Lipid Profile; Future  - CBC WITHOUT DIFFERENTIAL; Future  - Comp Metabolic Panel; Future  - MICROALBUMIN CREAT RATIO URINE; Future  - Blood Glucose " Meter Kit; Test blood sugar as recommended by provider.  Dispense: 1 Kit; Refill: 0  - Blood Glucose Test Strips; Test glucoses daily in the morning fasting  Dispense: 100 Each; Refill: 5  - Lancets; Use one to check glucoses daily morning before breakfast  Dispense: 100 Each; Refill: 5  - Alcohol Swabs; Wipe site with prep pad prior to injection.  Dispense: 100 Each; Refill: 5  Uncontrolled diabetes.  Significant time spent counseling patient on proper diet.  Restart Metformin extended release.  Also start on Trulicity for weight loss benefits as well.  Start 0.75 mg weekly.  Labs as above.  Patient to start glucose monitoring, daily fasting.    2. Encounter for initial prescription of contraceptive pills  - Norethindrone Acet-Ethinyl Est (LOESTRIN 1.5/30, 21,) 1.5-30 MG-MCG Tab; Take 1 tablet by mouth every day.  Dispense: 28 tablet; Refill: 11  We will start on contraceptives for regulation of her cycles and for her painful menstrual cycles.  Irregular cycles have improved slightly with 15 pound weight loss since August of last year.  Patient states she was previously on Loestrin, unknown dose.    3. PCOS (polycystic ovarian syndrome)  Appropriate diagnosis with hyperandrogenism and irregular cycles, likely anovulatory or oligoovulatory    4. Moderate episode of recurrent major depressive disorder (HCC)  - TSH WITH REFLEX TO FT4; Future  Unfortunately did not have enough time to review her psychiatric history.  She reportedly did well with sertraline, however she does have potential aspects of manic episodes in the past, will need to ask further at next appointment.  Consider Seroquel as patient states that she has very poor sleep for mood stabilization.    5. Chronic diarrhea  - dicyclomine (BENTYL) 20 MG Tab; Take 1 tablet by mouth 3 times a day before meals.  Dispense: 30 tablet; Refill: 5  - loperamide (IMODIUM) 2 MG Cap; Take 1 capsule by mouth 1 time a day as needed for Diarrhea.  Dispense: 30 capsule;  Refill: 2  Medication refills, was unable to ask further questions about her diarrhea, will need to discuss further at next appointment, consider stool studies    6. Eczema, unspecified type  - hydrocortisone 2.5 % Cream topical cream; Apply thin layer to areas of eczema daily as needed  Dispense: 30 g; Refill: 3  Med refill    7. Mild intermittent asthma without complication  - albuterol (PROVENTIL HFA) 108 (90 Base) MCG/ACT Aero Soln inhalation aerosol; Inhale 2 Puffs every 6 hours as needed for Shortness of Breath.  Dispense: 8.5 g; Refill: 5  Med refill.  Controlled asthma    8. Gastroesophageal reflux disease, unspecified whether esophagitis present  - famotidine (PEPCID) 20 MG Tab; Take 1 tablet by mouth 2 times a day.  Dispense: 60 tablet; Refill: 2  Medication refill, used to be on Zantac.  Consider H. pylori testing      Return in about 3 weeks (around 3/31/2021).    Please note that this dictation was created using voice recognition software. I have made every reasonable attempt to correct obvious errors, but I expect that there are errors of grammar and possibly content that I did not discover before finalizing the note. Patient is a 25 y.o. female.

## 2021-03-10 NOTE — ASSESSMENT & PLAN NOTE
"Was on abilify and prazosin previously - she states that \"I cannot function\"    She states that she was on sertraline previously and did well on it, however unknown if she was also on other mood stabilizing medications.     Patient states that there are periods of time when she felt great and decided to go shopping and spent 2020, but probably only lasted a day. She states she doesn't sleep well normally, only sleeps a few hours at a time.     No Si/HI currently    Depression Screening    Little interest or pleasure in doing things?  1 - several days   Feeling down, depressed , or hopeless? 1 - several days   Trouble falling or staying asleep, or sleeping too much?  3 - nearly every day   Feeling tired or having little energy?  3 - nearly every day   Poor appetite or overeating?  3 - nearly every day   Feeling bad about yourself - or that you are a failure or have let yourself or your family down? 1 - several days   Trouble concentrating on things, such as reading the newspaper or watching television? 1 - several days   Moving or speaking so slowly that other people could have noticed.  Or the opposite - being so fidgety or restless that you have been moving around a lot more than usual?  0 - not at all   Thoughts that you would be better off dead, or of hurting yourself?  1 - several days   Patient Health Questionnaire Score: 14             "

## 2021-03-10 NOTE — ASSESSMENT & PLAN NOTE
Cycles once q2-3 months, last couple years twice a year  About 15 lbs weight loss since 08/2020 - cut out soda entirely, tries to avoid sugary drinks, eats out less than she used to, walks to work daily (5 min walk)  Does have significant pain with cycles - lower abdomen to entire abdomen  7 days of heavy flow - 7-10 pads or tampons in a day   Recently bleeding on and off for 2 days at a time for 1 month  She is also concerned about endometriosis

## 2021-03-10 NOTE — ASSESSMENT & PLAN NOTE
- Patient is sexually active and interested in starting birth control. In the past, she has used: loestrin.   - Family or personal history of clotting disorders, PEs, or DVTs: none   - Hx of migraines: none  - Contraindications? none

## 2021-05-24 ENCOUNTER — HOSPITAL ENCOUNTER (EMERGENCY)
Facility: MEDICAL CENTER | Age: 26
End: 2021-05-24
Attending: EMERGENCY MEDICINE | Admitting: EMERGENCY MEDICINE
Payer: COMMERCIAL

## 2021-05-24 ENCOUNTER — OFFICE VISIT (OUTPATIENT)
Dept: URGENT CARE | Facility: CLINIC | Age: 26
End: 2021-05-24
Payer: COMMERCIAL

## 2021-05-24 VITALS
RESPIRATION RATE: 18 BRPM | DIASTOLIC BLOOD PRESSURE: 94 MMHG | TEMPERATURE: 97.1 F | BODY MASS INDEX: 40.02 KG/M2 | HEART RATE: 63 BPM | SYSTOLIC BLOOD PRESSURE: 132 MMHG | HEIGHT: 61 IN | WEIGHT: 212 LBS | OXYGEN SATURATION: 96 %

## 2021-05-24 VITALS
TEMPERATURE: 97.3 F | RESPIRATION RATE: 16 BRPM | SYSTOLIC BLOOD PRESSURE: 106 MMHG | HEART RATE: 74 BPM | BODY MASS INDEX: 40.5 KG/M2 | OXYGEN SATURATION: 97 % | DIASTOLIC BLOOD PRESSURE: 66 MMHG | HEIGHT: 61 IN | WEIGHT: 214.51 LBS

## 2021-05-24 DIAGNOSIS — K08.89 PAIN, DENTAL: ICD-10-CM

## 2021-05-24 DIAGNOSIS — K08.89 TOOTH PAIN: ICD-10-CM

## 2021-05-24 PROCEDURE — 99283 EMERGENCY DEPT VISIT LOW MDM: CPT

## 2021-05-24 PROCEDURE — 96372 THER/PROPH/DIAG INJ SC/IM: CPT

## 2021-05-24 PROCEDURE — 99213 OFFICE O/P EST LOW 20 MIN: CPT | Performed by: NURSE PRACTITIONER

## 2021-05-24 PROCEDURE — 700111 HCHG RX REV CODE 636 W/ 250 OVERRIDE (IP): Performed by: EMERGENCY MEDICINE

## 2021-05-24 RX ORDER — AMOXICILLIN 875 MG/1
875 TABLET, COATED ORAL 2 TIMES DAILY
Qty: 14 TABLET | Refills: 0 | Status: SHIPPED | OUTPATIENT
Start: 2021-05-24 | End: 2021-05-31

## 2021-05-24 RX ORDER — KETOROLAC TROMETHAMINE 30 MG/ML
30 INJECTION, SOLUTION INTRAMUSCULAR; INTRAVENOUS ONCE
Status: COMPLETED | OUTPATIENT
Start: 2021-05-24 | End: 2021-05-24

## 2021-05-24 RX ORDER — HYDROMORPHONE HYDROCHLORIDE 2 MG/ML
1.5 INJECTION, SOLUTION INTRAMUSCULAR; INTRAVENOUS; SUBCUTANEOUS ONCE
Status: COMPLETED | OUTPATIENT
Start: 2021-05-24 | End: 2021-05-24

## 2021-05-24 RX ORDER — IBUPROFEN 200 MG
200 TABLET ORAL EVERY 6 HOURS PRN
COMMUNITY
End: 2023-11-06

## 2021-05-24 RX ADMIN — HYDROMORPHONE HYDROCHLORIDE 1.5 MG: 2 INJECTION, SOLUTION INTRAMUSCULAR; INTRAVENOUS; SUBCUTANEOUS at 19:58

## 2021-05-24 RX ADMIN — KETOROLAC TROMETHAMINE 30 MG: 30 INJECTION, SOLUTION INTRAMUSCULAR; INTRAVENOUS at 11:27

## 2021-05-24 ASSESSMENT — ENCOUNTER SYMPTOMS
CHILLS: 0
DIZZINESS: 0
NAUSEA: 0
FEVER: 0
MYALGIAS: 0
HEADACHES: 0

## 2021-05-24 ASSESSMENT — FIBROSIS 4 INDEX
FIB4 SCORE: 0.34
FIB4 SCORE: 0.34

## 2021-05-24 ASSESSMENT — PAIN DESCRIPTION - PAIN TYPE: TYPE: ACUTE PAIN

## 2021-05-24 NOTE — PROGRESS NOTES
Subjective:      Anisha Tristan is a 26 y.o. female who presents with Dental Pain (right side x 3 days)            HPI   New problem.  Patient is a 26-year-old female who presents with dental pain on the right side x3 days.  She reports severe pain and being unable to sleep at night.  She denies fever, chills, myalgia, nausea, or headache.  She has been taking copious amounts of anti-inflammatories as well as Tylenol.  Is requesting prescription for Percocet for the pain.  Abilify, Norco [apap-fd&c blue #1-hydrocodone], and Norco [hydrocodone-acetaminophen]  Current Outpatient Medications on File Prior to Visit   Medication Sig Dispense Refill   • ibuprofen (MOTRIN) 200 MG Tab Take 200 mg by mouth every 6 hours as needed.     • Acetaminophen (TYLENOL 8 HOUR PO) Take  by mouth.     • albuterol (PROVENTIL HFA) 108 (90 Base) MCG/ACT Aero Soln inhalation aerosol Inhale 2 Puffs every 6 hours as needed for Shortness of Breath. (Patient not taking: Reported on 5/24/2021) 8.5 g 5   • dicyclomine (BENTYL) 20 MG Tab Take 1 tablet by mouth 3 times a day before meals. (Patient not taking: Reported on 5/24/2021) 30 tablet 5   • loperamide (IMODIUM) 2 MG Cap Take 1 capsule by mouth 1 time a day as needed for Diarrhea. (Patient not taking: Reported on 5/24/2021) 30 capsule 2   • metFORMIN ER (GLUCOPHAGE XR) 500 MG TABLET SR 24 HR Take 1 tablet by mouth every day. (Patient not taking: Reported on 5/24/2021) 30 tablet 5   • famotidine (PEPCID) 20 MG Tab Take 1 tablet by mouth 2 times a day. (Patient not taking: Reported on 5/24/2021) 60 tablet 2   • hydrocortisone 2.5 % Cream topical cream Apply thin layer to areas of eczema daily as needed (Patient not taking: Reported on 5/24/2021) 30 g 3   • Norethindrone Acet-Ethinyl Est (LOESTRIN 1.5/30, 21,) 1.5-30 MG-MCG Tab Take 1 tablet by mouth every day. (Patient not taking: Reported on 5/24/2021) 28 tablet 11   • Dulaglutide (TRULICITY) 0.75 MG/0.5ML Solution Pen-injector Inject 0.5  mL under the skin every 7 days. (Patient not taking: Reported on 2021) 1.96 mL 2   • Blood Glucose Meter Kit Test blood sugar as recommended by provider. (Patient not taking: Reported on 2021) 1 Kit 0   • Blood Glucose Test Strips Test glucoses daily in the morning fasting (Patient not taking: Reported on 2021) 100 Each 5   • Lancets Use one to check glucoses daily morning before breakfast (Patient not taking: Reported on 2021) 100 Each 5   • Alcohol Swabs Wipe site with prep pad prior to injection. (Patient not taking: Reported on 2021) 100 Each 5     No current facility-administered medications on file prior to visit.     Breast Cancer-related family history is not on file.  Social History     Socioeconomic History   • Marital status:      Spouse name: Not on file   • Number of children: Not on file   • Years of education: Not on file   • Highest education level: Not on file   Occupational History   • Not on file   Tobacco Use   • Smoking status: Former Smoker     Packs/day: 0.25     Years: 15.00     Pack years: 3.75     Types: Cigarettes     Quit date: 2/15/2021     Years since quittin.2   • Smokeless tobacco: Never Used   Vaping Use   • Vaping Use: Every day   • Substances: Nicotine, Flavoring   • Devices: Disposable   Substance and Sexual Activity   • Alcohol use: Yes     Comment: 1/2 bottle of wine every 2 months   • Drug use: Not Currently     Types: Methamphetamines, Inhaled     Comment: history of meth from [id number], no IV, one use a couple months ago    • Sexual activity: Yes     Partners: Male     Birth control/protection: Condom   Other Topics Concern   • Not on file   Social History Narrative    ** Merged History Encounter **          Social Determinants of Health     Financial Resource Strain:    • Difficulty of Paying Living Expenses:    Food Insecurity:    • Worried About Running Out of Food in the Last Year:    • Ran Out of Food in the Last Year:   "  Transportation Needs:    • Lack of Transportation (Medical):    • Lack of Transportation (Non-Medical):    Physical Activity:    • Days of Exercise per Week:    • Minutes of Exercise per Session:    Stress:    • Feeling of Stress :    Social Connections:    • Frequency of Communication with Friends and Family:    • Frequency of Social Gatherings with Friends and Family:    • Attends Temple Services:    • Active Member of Clubs or Organizations:    • Attends Club or Organization Meetings:    • Marital Status:    Intimate Partner Violence:    • Fear of Current or Ex-Partner:    • Emotionally Abused:    • Physically Abused:    • Sexually Abused:          Review of Systems   Constitutional: Negative for chills, fever and malaise/fatigue.   HENT:        +dental pain   Gastrointestinal: Negative for nausea.   Musculoskeletal: Negative for myalgias.   Neurological: Negative for dizziness and headaches.          Objective:     /94 (BP Location: Left arm, Patient Position: Sitting, BP Cuff Size: Adult long)   Pulse 63   Temp 36.2 °C (97.1 °F) (Temporal)   Resp 18   Ht 1.549 m (5' 1\") Comment: patient stated  Wt 96.2 kg (212 lb)   SpO2 96%   BMI 40.06 kg/m²      Physical Exam  Constitutional:       Appearance: Normal appearance. She is obese. She is not ill-appearing.   HENT:      Right Ear: Tympanic membrane normal.      Left Ear: Tympanic membrane normal.      Mouth/Throat:      Dentition: Abnormal dentition. Dental caries present. No gingival swelling or dental abscesses.   Cardiovascular:      Rate and Rhythm: Normal rate and regular rhythm.      Heart sounds: No murmur heard.     Pulmonary:      Effort: Pulmonary effort is normal.   Musculoskeletal:         General: Normal range of motion.   Skin:     General: Skin is warm and dry.   Neurological:      Mental Status: She is alert and oriented to person, place, and time.                        Assessment/Plan:        1. Tooth pain  amoxicillin (AMOXIL) " 875 MG tablet    ketorolac (TORADOL) injection 30 mg     Patient here for tooth pain.  On exam I do not see any evidence of any abscess or gingival swelling.  We will treat with a 7-day course of amoxicillin as well as a shot of Toradol today.  I am declining her request for Percocet due to the fact that she is allergic to Norco although she states that she does tolerate Percocet but she also has a history of methamphetamine use so would like to try and steer away from narcotic pain medication at this time.  She is advised that she may continue Tylenol but no NSAIDs for the next 6 to 8 hours.

## 2021-05-25 NOTE — ED NOTES
Patient discharged home in stable condition with significant other  AVS provided with recommended follow up and home care instructions and education information  No prescriptions provided at this time; discussed filling antibiotic prescription  Patient and significant other verbalized understanding  Ambulatory at time of discharge

## 2021-05-25 NOTE — ED PROVIDER NOTES
ED Provider Note    ED Provider    Means of arrival: Private vehicle  History obtained from: Patient  History limited by: None    CHIEF COMPLAINT  Chief Complaint   Patient presents with   • Dental Pain       HPI  Anisha Tristan is a 26 y.o. female who presents complains of right-sided dental pain is been ongoing for 3 days.  She was seen at the urgent care… Given a prescription for antibiotics which she has not filled she received Toradol which has not helped her pain she has been taking ibuprofen otherwise for pain.    She called accidental and they will not be able to get her in for several months.    REVIEW OF SYSTEMS  See HPI for further details.     PAST MEDICAL HISTORY   has a past medical history of ASTHMA, Asthma, Carpal tunnel syndrome, Diabetes (HCC), Hyperlipidemia, Morbid obesity (HCC), PCOS (polycystic ovarian syndrome), and Vitamin D deficiency.    SOCIAL HISTORY  Social History     Tobacco Use   • Smoking status: Former Smoker     Packs/day: 0.25     Years: 15.00     Pack years: 3.75     Types: Cigarettes     Quit date: 2/15/2021     Years since quittin.2   • Smokeless tobacco: Never Used   Vaping Use   • Vaping Use: Every day   • Substances: Nicotine, Flavoring   • Devices: Disposable   Substance and Sexual Activity   • Alcohol use: Yes     Comment: 1/2 bottle of wine every 2 months   • Drug use: Not Currently     Types: Methamphetamines, Inhaled     Comment: history of meth from [id number], no IV, one use a couple months ago    • Sexual activity: Yes     Partners: Male     Birth control/protection: Condom       SURGICAL HISTORY   has a past surgical history that includes eye surgery; open reduction; carpal tunnel release (Bilateral); and ankle orif (Right, ).    CURRENT MEDICATIONS  Home Medications     Reviewed by Alejandra Washington R.N. (Registered Nurse) on 21 at 1849  Med List Status: Not Addressed   Medication Last Dose Status   Acetaminophen (TYLENOL 8 HOUR PO)  Active  "  albuterol (PROVENTIL HFA) 108 (90 Base) MCG/ACT Aero Soln inhalation aerosol  Active   Alcohol Swabs  Active   amoxicillin (AMOXIL) 875 MG tablet  Active   Blood Glucose Meter Kit  Active   Blood Glucose Test Strips  Active   dicyclomine (BENTYL) 20 MG Tab  Active   Dulaglutide (TRULICITY) 0.75 MG/0.5ML Solution Pen-injector  Active   famotidine (PEPCID) 20 MG Tab  Active   hydrocortisone 2.5 % Cream topical cream  Active   ibuprofen (MOTRIN) 200 MG Tab  Active   Lancets  Active   loperamide (IMODIUM) 2 MG Cap  Active   metFORMIN ER (GLUCOPHAGE XR) 500 MG TABLET SR 24 HR  Active   Norethindrone Acet-Ethinyl Est (LOESTRIN 1.5/30, 21,) 1.5-30 MG-MCG Tab  Active                ALLERGIES  Allergies   Allergen Reactions   • Abilify Unspecified     Pt states that the medication affects her mobility, room starts spinning, unable to walk or use hands or feet, black outs   • Norco [Apap-Fd&C Blue #1-Hydrocodone] Vomiting   • Norco [Hydrocodone-Acetaminophen] Vomiting   • Prazosin        PHYSICAL EXAM  VITAL SIGNS: /66   Pulse 74   Temp 36.3 °C (97.3 °F) (Temporal)   Resp 16   Ht 1.549 m (5' 1\")   Wt 97.3 kg (214 lb 8.1 oz)   SpO2 97%   BMI 40.53 kg/m²   Constitutional: Alert in no apparent distress.  HENT: Normocephalic, Atraumatic, Mucous membranes are moist, no facial swelling.  There is cavitary lesion right upper posterior.  With surrounding erythema.  No abscess  Eyes:  Conjunctiva normal, non-icteric.   Heart: no peripheral cyanosis  Lungs: respirations are even and unlabored  Skin: Warm, Dry,normal color  Neurologic: Alert, Grossly non-focal.   Psychiatric: Affect normal, Judgment normal, Mood normal, Appears appropriate and not intoxicated.     MEDICAL DECISION MAKING  This is a 26 y.o. female who presents with dental pain, she received Toradol without effect.  She has been given a prescription for antibiotics but has not filled it yet.  She is encouraged to fill her antibiotic prescription and start " taking that so she can begin to get improvement.  In the meantime she will be received 1 injection of narcotic medication to assist with the discomfort for this evening.    DIAGNOSTIC STUDIES / PROCEDURES    LABS      RADIOLOGY  No orders to display       COURSE  Pertinent Labs & Imaging studies reviewed. (See chart for details)    7:38 PM - Patient seen and examined at bedside. Discussed plan of care,  Patient was injected with Dilaudid pain was minimally changed prior to discharge but this will take some time to take full effect.       Discharged in stable condition    FINAL IMPRESSION  1. Pain, dental        The note accurately reflects work and decisions made by me.  Derrick Madison D.O.  5/24/2021  7:40 PM

## 2021-05-25 NOTE — ED TRIAGE NOTES
Pt presents with boyfriend from home for dental pain on mandible and maxillary area on right side. OTC meds not effective. Was seen at urgent care and given a Toradol shot without relief and given antibx. A&Ox4 and ambulatory.     Patient masked. No respiratory symptoms, no recent travel, denies known COVID exposure.

## 2021-05-25 NOTE — DISCHARGE INSTRUCTIONS
Continue Motrin Tylenol for pain.  You are being given a narcotic medication right now to help you get through tonight.  Get your antibiotics filled and start taking those of sooner if you take those the sooner the pain will typically improve.    Please see dentist as soon as available.

## 2023-11-06 ENCOUNTER — OFFICE VISIT (OUTPATIENT)
Dept: URGENT CARE | Facility: CLINIC | Age: 28
End: 2023-11-06
Payer: COMMERCIAL

## 2023-11-06 VITALS
WEIGHT: 215 LBS | HEART RATE: 75 BPM | HEIGHT: 61 IN | TEMPERATURE: 97.6 F | RESPIRATION RATE: 16 BRPM | DIASTOLIC BLOOD PRESSURE: 70 MMHG | SYSTOLIC BLOOD PRESSURE: 104 MMHG | OXYGEN SATURATION: 98 % | BODY MASS INDEX: 40.59 KG/M2

## 2023-11-06 DIAGNOSIS — B96.89 ACUTE BACTERIAL SINUSITIS: ICD-10-CM

## 2023-11-06 DIAGNOSIS — J01.90 ACUTE BACTERIAL SINUSITIS: ICD-10-CM

## 2023-11-06 PROCEDURE — 3074F SYST BP LT 130 MM HG: CPT | Performed by: NURSE PRACTITIONER

## 2023-11-06 PROCEDURE — 99213 OFFICE O/P EST LOW 20 MIN: CPT | Performed by: NURSE PRACTITIONER

## 2023-11-06 PROCEDURE — 3078F DIAST BP <80 MM HG: CPT | Performed by: NURSE PRACTITIONER

## 2023-11-06 RX ORDER — IBUPROFEN 800 MG/1
800 TABLET ORAL EVERY 8 HOURS PRN
Qty: 15 TABLET | Refills: 0 | Status: SHIPPED | OUTPATIENT
Start: 2023-11-06 | End: 2023-11-13

## 2023-11-06 RX ORDER — SWAB
1 SWAB, NON-MEDICATED MISCELLANEOUS
COMMUNITY
Start: 2023-08-14 | End: 2023-11-10 | Stop reason: SDUPTHER

## 2023-11-06 RX ORDER — AMOXICILLIN 875 MG/1
875 TABLET, COATED ORAL 2 TIMES DAILY
Qty: 14 TABLET | Refills: 0 | Status: SHIPPED | OUTPATIENT
Start: 2023-11-06 | End: 2023-11-13

## 2023-11-10 ENCOUNTER — OFFICE VISIT (OUTPATIENT)
Dept: MEDICAL GROUP | Facility: MEDICAL CENTER | Age: 28
End: 2023-11-10
Attending: FAMILY MEDICINE
Payer: COMMERCIAL

## 2023-11-10 VITALS
HEIGHT: 61 IN | BODY MASS INDEX: 40.59 KG/M2 | DIASTOLIC BLOOD PRESSURE: 70 MMHG | WEIGHT: 215 LBS | SYSTOLIC BLOOD PRESSURE: 100 MMHG | TEMPERATURE: 97.5 F | RESPIRATION RATE: 16 BRPM | OXYGEN SATURATION: 96 % | HEART RATE: 95 BPM

## 2023-11-10 DIAGNOSIS — Z23 NEED FOR VACCINATION: ICD-10-CM

## 2023-11-10 DIAGNOSIS — K21.9 GASTROESOPHAGEAL REFLUX DISEASE, UNSPECIFIED WHETHER ESOPHAGITIS PRESENT: ICD-10-CM

## 2023-11-10 DIAGNOSIS — F33.1 MODERATE EPISODE OF RECURRENT MAJOR DEPRESSIVE DISORDER (HCC): ICD-10-CM

## 2023-11-10 DIAGNOSIS — Z31.9 DESIRE FOR PREGNANCY: ICD-10-CM

## 2023-11-10 DIAGNOSIS — J45.20 MILD INTERMITTENT ASTHMA WITHOUT COMPLICATION: ICD-10-CM

## 2023-11-10 DIAGNOSIS — G56.03 BILATERAL CARPAL TUNNEL SYNDROME: ICD-10-CM

## 2023-11-10 DIAGNOSIS — E11.9 TYPE 2 DIABETES MELLITUS WITHOUT COMPLICATION, WITHOUT LONG-TERM CURRENT USE OF INSULIN (HCC): ICD-10-CM

## 2023-11-10 DIAGNOSIS — K52.9 CHRONIC DIARRHEA: ICD-10-CM

## 2023-11-10 LAB
HBA1C MFR BLD: 6.8 % (ref ?–5.8)
POCT INT CON NEG: NEGATIVE
POCT INT CON POS: POSITIVE

## 2023-11-10 PROCEDURE — 3078F DIAST BP <80 MM HG: CPT | Performed by: FAMILY MEDICINE

## 2023-11-10 PROCEDURE — 90471 IMMUNIZATION ADMIN: CPT

## 2023-11-10 PROCEDURE — 99213 OFFICE O/P EST LOW 20 MIN: CPT | Performed by: FAMILY MEDICINE

## 2023-11-10 PROCEDURE — 83036 HEMOGLOBIN GLYCOSYLATED A1C: CPT | Performed by: FAMILY MEDICINE

## 2023-11-10 PROCEDURE — 99214 OFFICE O/P EST MOD 30 MIN: CPT | Performed by: FAMILY MEDICINE

## 2023-11-10 PROCEDURE — 3074F SYST BP LT 130 MM HG: CPT | Performed by: FAMILY MEDICINE

## 2023-11-10 RX ORDER — SERTRALINE HYDROCHLORIDE 100 MG/1
100 TABLET, FILM COATED ORAL
Qty: 30 TABLET | Refills: 5 | Status: SHIPPED | OUTPATIENT
Start: 2023-11-10 | End: 2023-12-27 | Stop reason: SDUPTHER

## 2023-11-10 RX ORDER — ALBUTEROL SULFATE 90 UG/1
2 AEROSOL, METERED RESPIRATORY (INHALATION) EVERY 6 HOURS PRN
Qty: 8.5 G | Refills: 5 | Status: SHIPPED | OUTPATIENT
Start: 2023-11-10

## 2023-11-10 RX ORDER — FAMOTIDINE 20 MG/1
20 TABLET, FILM COATED ORAL 2 TIMES DAILY
Qty: 60 TABLET | Refills: 5 | Status: SHIPPED | OUTPATIENT
Start: 2023-11-10

## 2023-11-10 RX ORDER — METFORMIN HYDROCHLORIDE 500 MG/1
500 TABLET, EXTENDED RELEASE ORAL DAILY
Qty: 30 TABLET | Refills: 5 | Status: SHIPPED | OUTPATIENT
Start: 2023-11-10

## 2023-11-10 RX ORDER — SWAB
1 SWAB, NON-MEDICATED MISCELLANEOUS
Qty: 30 TABLET | Refills: 5 | Status: SHIPPED | OUTPATIENT
Start: 2023-11-10

## 2023-11-10 RX ORDER — DULAGLUTIDE 1.5 MG/.5ML
0.5 INJECTION, SOLUTION SUBCUTANEOUS
Qty: 2.24 ML | Refills: 5 | Status: SHIPPED | OUTPATIENT
Start: 2023-11-10 | End: 2024-01-05 | Stop reason: SDUPTHER

## 2023-11-10 RX ORDER — DICYCLOMINE HCL 20 MG
20 TABLET ORAL
Qty: 30 TABLET | Refills: 5 | Status: SHIPPED | OUTPATIENT
Start: 2023-11-10

## 2023-11-10 RX ORDER — LOPERAMIDE HYDROCHLORIDE 2 MG/1
2 CAPSULE ORAL
Qty: 30 CAPSULE | Refills: 2 | Status: SHIPPED | OUTPATIENT
Start: 2023-11-10

## 2023-11-10 RX ORDER — DULAGLUTIDE 1.5 MG/.5ML
INJECTION, SOLUTION SUBCUTANEOUS
COMMUNITY
Start: 2023-10-30 | End: 2023-11-10

## 2023-11-10 ASSESSMENT — PATIENT HEALTH QUESTIONNAIRE - PHQ9
CLINICAL INTERPRETATION OF PHQ2 SCORE: 5
SUM OF ALL RESPONSES TO PHQ QUESTIONS 1-9: 21
5. POOR APPETITE OR OVEREATING: 3 - NEARLY EVERY DAY

## 2023-11-10 NOTE — PROGRESS NOTES
"Subjective:     CC:   Chief Complaint   Patient presents with    Diabetes    Medication Refill         HPI:     Patient has not been seen in over 2 years.  She was living in [place], was receiving routine primary care.    Type 2 diabetes mellitus without complication, without long-term current use of insulin (HCC)  A1c today is 6.8%  She is currently on metformin, confirmed with her pharmacy that it was 500 mg daily, not extended release formulation.  Trulicity 1.5mg/week, increased about 2 months ago   She was [facility] and [facility]   Admits that diet isn't as good as it could be. Does admit to a lot of donuts bread and pasta.   Does not exercise.   Notes that she has had some weight loss over the last couple of months since starting on Trulicity.  In the past has had pretty poor control, was at 8.5% 2 years ago.    Moderate episode of recurrent major depressive disorder (HCC)  No si/hi, but has thoughts that maybe the world be better if she werent here, but no active thoughts. Hx of cutting but no suicide attempts  Has severe depression  Has seen a therapist before   Previously on prazosin, abilify, had bad side effects  Zoloft is restarted in the last month. This has helped make her mood less \"dark\" but has not taken anything away.   Recently moved back to the area, now has better support system around her    Depression Screening    Little interest or pleasure in doing things?  2 - more than half the days   Feeling down, depressed , or hopeless? 3 - nearly every day   Trouble falling or staying asleep, or sleeping too much?  2 - more than half the days   Feeling tired or having little energy?  3 - nearly every day   Poor appetite or overeating?  3 - nearly every day   Feeling bad about yourself - or that you are a failure or have let yourself or your family down? 3 - nearly every day   Trouble concentrating on things, such as reading the newspaper or watching television? 3 - " nearly every day   Moving or speaking so slowly that other people could have noticed.  Or the opposite - being so fidgety or restless that you have been moving around a lot more than usual?  2 - more than half the days   Thoughts that you would be better off dead, or of hurting yourself?  0 - not at all   Patient Health Questionnaire Score: 21       If depressive symptoms identified deferred to follow up visit unless specifically addressed in assesment and plan.    Interpretation of PHQ-9 Total Score   Score Severity   1-4 No Depression   5-9 Mild Depression   10-14 Moderate Depression   15-19 Moderately Severe Depression   20-27 Severe Depression      Bilateral carpal tunnel syndrome  Pt requesting wrist braces  She has numbness tingling in b/l hands, wakes her up from sleep.      Past Medical History:   Diagnosis Date    ASTHMA     Asthma     Carpal tunnel syndrome     Diabetes (HCC)     Hyperlipidemia     Morbid obesity (HCC)     PCOS (polycystic ovarian syndrome)     Vitamin D deficiency        Social History     Tobacco Use    Smoking status: Former     Current packs/day: 0.00     Average packs/day: 0.3 packs/day for 15.0 years (3.8 ttl pk-yrs)     Types: Cigarettes     Start date: 2/15/2006     Quit date: 2/15/2021     Years since quittin.7    Smokeless tobacco: Never   Vaping Use    Vaping Use: Every day    Substances: Nicotine, Flavoring    Devices: Disposable   Substance Use Topics    Alcohol use: Yes     Comment: 1/2 bottle of wine every 2 months    Drug use: Not Currently     Types: Methamphetamines, Inhaled     Comment: history of meth from 8216-1687, no IV, one use a couple months ago        Current Outpatient Medications Ordered in Epic   Medication Sig Dispense Refill    albuterol (PROVENTIL HFA) 108 (90 Base) MCG/ACT Aero Soln inhalation aerosol Inhale 2 Puffs every 6 hours as needed for Shortness of Breath. 8.5 g 5    prenatal vitamin with Fe/FA (SANDOR PRENATAL) 28-0.8 MG Tab Take 1 Tablet by  "mouth every day. 30 Tablet 5    sertraline (ZOLOFT) 100 MG Tab Take 1 Tablet by mouth every day. 30 Tablet 5    famotidine (PEPCID) 20 MG Tab Take 1 Tablet by mouth 2 times a day. 60 Tablet 5    dicyclomine (BENTYL) 20 MG Tab Take 1 Tablet by mouth 3 times a day before meals. 30 Tablet 5    Dulaglutide (TRULICITY) 1.5 MG/0.5ML Solution Pen-injector Inject 0.5 mL under the skin every 7 days. 2.24 mL 5    loperamide (IMODIUM) 2 MG Cap Take 1 Capsule by mouth 1 time a day as needed for Diarrhea. 30 Capsule 2    metFORMIN ER (GLUCOPHAGE XR) 500 MG TABLET SR 24 HR Take 1 Tablet by mouth every day. 30 Tablet 5    amoxicillin (AMOXIL) 875 MG tablet Take 1 Tablet by mouth 2 times a day for 7 days. 14 Tablet 0    ibuprofen (MOTRIN) 800 MG Tab Take 1 Tablet by mouth every 8 hours as needed for Moderate Pain for up to 7 days. 15 Tablet 0    hydrocortisone 2.5 % Cream topical cream Apply thin layer to areas of eczema daily as needed 30 g 3    Blood Glucose Meter Kit Test blood sugar as recommended by provider. 1 Kit 0    Blood Glucose Test Strips Test glucoses daily in the morning fasting 100 Each 5    Lancets Use one to check glucoses daily morning before breakfast 100 Each 5     No current Epic-ordered facility-administered medications on file.       Allergies:  Abilify, Norco [apap-fd&c blue #1-hydrocodone], Norco [hydrocodone-acetaminophen], and Prazosin        Objective:       Exam:  /70 (BP Location: Left arm, Patient Position: Sitting)   Pulse 95   Temp 36.4 °C (97.5 °F) (Temporal)   Resp 16   Ht 1.549 m (5' 1\")   Wt 97.5 kg (215 lb)   SpO2 96%   BMI 40.62 kg/m²  Body mass index is 40.62 kg/m².    General: Normal appearing. No distress.  Pulmonary: Clear to ausculation.  Normal effort. No rales, ronchi, or wheezing.  Cardiovascular: Regular rate and rhythm without murmur.   Psych: Normal mood and affect.       Labs:   A1c today is 6.8%    Assessment & Plan:     28 y.o. female with the following -     1. Type " 2 diabetes mellitus without complication, without long-term current use of insulin ([facility])  - POCT Hemoglobin A1C  - Dulaglutide (TRULICITY) 1.5 MG/0.5ML Solution Pen-injector; Inject 0.5 mL under the skin every 7 days.  Dispense: 2.24 mL; Refill: 5  - metFORMIN ER (GLUCOPHAGE XR) 500 MG TABLET SR 24 HR; Take 1 Tablet by mouth every day.  Dispense: 30 Tablet; Refill: 5  Patient is doing well on current medication regimen.  A1c is under goal.  I encouraged her to work on exercise and diet, she admits that her diet is quite poor.  We confirmed with her prior pharmacy that she was taking metformin non extended release 500 mg daily.  She does have complaints of chronic GI symptoms, we will switch her to metformin  mg.  We can consider can discontinuing metformin if she continues to have GI upset.  Outside records have been requested    2. Moderate episode of recurrent major depressive disorder (HCC)  - sertraline (ZOLOFT) 100 MG Tab; Take 1 Tablet by mouth every day.  Dispense: 30 Tablet; Refill: 5  Patient reporting significant depression, denies SI or HI.  Increasing Zoloft to 100 mg daily.    3. Mild intermittent asthma without complication  - albuterol (PROVENTIL HFA) 108 (90 Base) MCG/ACT Aero Soln inhalation aerosol; Inhale 2 Puffs every 6 hours as needed for Shortness of Breath.  Dispense: 8.5 g; Refill: 5  Medication refill will need to follow-up and discuss asthma status at next visit    4. Gastroesophageal reflux disease, unspecified whether esophagitis present  - famotidine (PEPCID) 20 MG Tab; Take 1 Tablet by mouth 2 times a day.  Dispense: 60 Tablet; Refill: 5  Patient has continued GI symptoms.  We do not have time to discuss this fully today.  She is continuing to need Bentyl and Imodium.  Will discuss at next visit    5. Chronic diarrhea  - dicyclomine (BENTYL) 20 MG Tab; Take 1 Tablet by mouth 3 times a day before meals.  Dispense: 30 Tablet; Refill: 5  - loperamide (IMODIUM) 2 MG Cap; Take 1  Capsule by mouth 1 time a day as needed for Diarrhea.  Dispense: 30 Capsule; Refill: 2    6. Desire for pregnancy  - prenatal vitamin with Fe/FA (SANDOR PRENATAL) 28-0.8 MG Tab; Take 1 Tablet by mouth every day.  Dispense: 30 Tablet; Refill: 5  Patient does not have intercourse frequently.  She has not preventing pregnancy in any way, does not mind if she gets pregnant.  She does have a history of PCOS, notes that she does not have regular periods.  We have tried intermittent progesterone in the past, which has worked pretty well.  She has been losing significant weight on Trulicity, I advised her that we can continue to observe as she loses weight, I do suspect that her.  May return with more weight loss.    7. Need for vaccination  - Influenza Vaccine Quad Injection (PF)  - Pneumococcal Conjugate Vaccine 20-Valent (6 wks+)    8. Bilateral carpal tunnel syndrome  Patient has continued symptoms of carpal tunnel syndrome.  Wrist braces are given to her today.    Return in about 6 weeks (around 12/22/2023) for Follow-up GI issues, depression, carpal tunnel syndrome..    Please note that this dictation was created using voice recognition software. I have made every reasonable attempt to correct obvious errors, but I expect that there are errors of grammar and possibly content that I did not discover before finalizing the note.

## 2023-11-10 NOTE — ASSESSMENT & PLAN NOTE
"No si/hi, but has thoughts that maybe the world be better if she werent here, but no active thoughts. Hx of cutting but no suicide attempts  Has severe depression  Has seen a therapist before   Previously on prazosin, abilify, had bad side effects  Zoloft is restarted in the last month. This has helped make her mood less \"dark\" but has not taken anything away.   Recently moved back to the area, now has better support system around her    Depression Screening    Little interest or pleasure in doing things?  2 - more than half the days   Feeling down, depressed , or hopeless? 3 - nearly every day   Trouble falling or staying asleep, or sleeping too much?  2 - more than half the days   Feeling tired or having little energy?  3 - nearly every day   Poor appetite or overeating?  3 - nearly every day   Feeling bad about yourself - or that you are a failure or have let yourself or your family down? 3 - nearly every day   Trouble concentrating on things, such as reading the newspaper or watching television? 3 - nearly every day   Moving or speaking so slowly that other people could have noticed.  Or the opposite - being so fidgety or restless that you have been moving around a lot more than usual?  2 - more than half the days   Thoughts that you would be better off dead, or of hurting yourself?  0 - not at all   Patient Health Questionnaire Score: 21       If depressive symptoms identified deferred to follow up visit unless specifically addressed in assesment and plan.    Interpretation of PHQ-9 Total Score   Score Severity   1-4 No Depression   5-9 Mild Depression   10-14 Moderate Depression   15-19 Moderately Severe Depression   20-27 Severe Depression    "

## 2023-11-10 NOTE — LETTER
[facility] hospital  Nabila Mitchell M.D.  [address]  [place] [address]  Fax: [phone]   Authorization for Release/Disclosure of   Protected Health Information   Name: ANISHA NUÑEZ : 1995 SSN: [id number]   Address: [address] [place] [address] [address]  [place] [address] Phone:    [phone] (home)    I authorize the entity listed below to release/disclose the PHI below to:   Critical access hospital/Nabila Mitchell M.D. and Nabila Mitchell M.D.   Provider or Entity Name:  [facility]    Address   City, State, [facility]   Phone:      Fax:     Reason for request: continuity of care   Information to be released:    [  ] LAST COLONOSCOPY,  including any PATH REPORT and follow-up  [  ] LAST FIT/COLOGUARD RESULT [  ] LAST DEXA  [  ] LAST MAMMOGRAM  [  ] LAST PAP  [  ] LAST LABS [  ] RETINA EXAM REPORT  [  ] IMMUNIZATION RECORDS  [ X ] Release all info      [  ] Check here and initial the line next to each item to release ALL health information INCLUDING  _____ Care and treatment for drug and / or alcohol abuse  _____ HIV testing, infection status, or AIDS  _____ Genetic Testing    DATES OF SERVICE OR TIME PERIOD TO BE DISCLOSED: _____________  I understand and acknowledge that:  * This Authorization may be revoked at any time by you in writing, except if your health information has already been used or disclosed.  * Your health information that will be used or disclosed as a result of you signing this authorization could be re-disclosed by the recipient. If this occurs, your re-disclosed health information may no longer be protected by State or Federal laws.  * You may refuse to sign this Authorization. Your refusal will not affect your ability to obtain treatment.  * This Authorization becomes effective upon signing and will  on (date) __________.      If no date is indicated, this Authorization will  one (1) year from the signature date.    Name: Anisha Nuñez  Signature: Date:   11/10/2023     PLEASE  FAX REQUESTED RECORDS BACK TO: [phone]

## 2023-11-10 NOTE — ASSESSMENT & PLAN NOTE
A1c today is 6.8%  She is currently on metformin, confirmed with her pharmacy that it was 500 mg daily, not extended release formulation.  Trulicity 1.5mg/week, increased about 2 months ago   She was receiving care from [facility]   Admits that diet isn't as good as it could be. Does admit to a lot of donuts bread and pasta.   Does not exercise.   Notes that she has had some weight loss over the last couple of months since starting on Trulicity.  In the past has had pretty poor control, was at 8.5% 2 years ago.

## 2023-11-11 ASSESSMENT — ENCOUNTER SYMPTOMS
CHILLS: 0
SINUS PRESSURE: 1
EYES NEGATIVE: 1
RESPIRATORY NEGATIVE: 1
FEVER: 0
MUSCULOSKELETAL NEGATIVE: 1
HOARSE VOICE: 0
SINUS PAIN: 1
SORE THROAT: 0
CARDIOVASCULAR NEGATIVE: 1
GASTROINTESTINAL NEGATIVE: 1
HEADACHES: 1

## 2023-11-11 NOTE — PROGRESS NOTES
"Subjective:   Anisha Tristan is a 28 y.o. female who presents for Sinus Problem (Pt reports pain in sinuses )      Sinus Problem  This is a new problem. Episode onset: 3 weeks. The problem has been gradually worsening since onset. There has been no fever. Her pain is at a severity of 5/10. The pain is moderate. Associated symptoms include congestion, ear pain, headaches and sinus pressure. Pertinent negatives include no chills, hoarse voice, sneezing or sore throat. Past treatments include acetaminophen, oral decongestants, nasal decongestants and saline nose sprays. The treatment provided mild relief.       Review of Systems   Constitutional:  Positive for malaise/fatigue. Negative for chills and fever.   HENT:  Positive for congestion, ear pain, sinus pressure and sinus pain. Negative for hoarse voice, sneezing and sore throat.    Eyes: Negative.    Respiratory: Negative.     Cardiovascular: Negative.    Gastrointestinal: Negative.    Genitourinary: Negative.    Musculoskeletal: Negative.    Skin: Negative.    Neurological:  Positive for headaches.   Endo/Heme/Allergies: Negative.        Medications, Allergies, and current problem list reviewed today in Epic.     Objective:     /70 (BP Location: Left arm, Patient Position: Sitting, BP Cuff Size: Adult)   Pulse 75   Temp 36.4 °C (97.6 °F) (Temporal)   Resp 16   Ht 1.549 m (5' 1\")   Wt 97.5 kg (215 lb)   SpO2 98%     Physical Exam  Vitals reviewed.   Constitutional:       General: She is not in acute distress.     Appearance: Normal appearance. She is ill-appearing.   HENT:      Head: Normocephalic and atraumatic.      Right Ear: Tympanic membrane, ear canal and external ear normal.      Left Ear: Tympanic membrane, ear canal and external ear normal.      Nose: Nasal tenderness and congestion present.      Right Nostril: Occlusion present.      Left Nostril: Occlusion present.      Left Turbinates: Enlarged.      Right Sinus: Maxillary sinus " tenderness present.      Left Sinus: Maxillary sinus tenderness and frontal sinus tenderness present.      Mouth/Throat:      Mouth: Mucous membranes are moist.      Pharynx: Oropharynx is clear.   Eyes:      Extraocular Movements: Extraocular movements intact.      Conjunctiva/sclera: Conjunctivae normal.      Pupils: Pupils are equal, round, and reactive to light.   Cardiovascular:      Rate and Rhythm: Normal rate and regular rhythm.   Pulmonary:      Effort: Pulmonary effort is normal.      Breath sounds: Normal breath sounds.   Abdominal:      General: Abdomen is flat.      Palpations: Abdomen is soft.   Musculoskeletal:         General: Normal range of motion.      Cervical back: Normal range of motion and neck supple.   Skin:     General: Skin is warm and dry.      Capillary Refill: Capillary refill takes less than 2 seconds.   Neurological:      General: No focal deficit present.      Mental Status: She is alert.   Psychiatric:         Mood and Affect: Mood normal.         Behavior: Behavior normal.         Assessment/Plan:     Diagnosis and associated orders:     1. Acute bacterial sinusitis  amoxicillin (AMOXIL) 875 MG tablet    ibuprofen (MOTRIN) 800 MG Tab         Comments/MDM:     Due to duration of symptoms, sinus tenderness, and failure of OTC therapies- ABX was written to tx. For bacterial etiology for sinusitis.   Continue OTC supportive therapies- Flonase, OTC allergy meds, avoid night time dairy. Increase fluids. Humidification.   Patient given precautionary s/sx that mandate immediate follow up and evaluation in the ED. Advised of risks of not doing so.    DDX, Supportive care, and indications for immediate follow-up discussed with patient.    Instructed to return to clinic or nearest emergency department if we are not available for any change in condition, further concerns, or worsening of symptoms.    The patient demonstrated a good understanding and agreed with the treatment plan            Differential diagnosis, natural history, supportive care, and indications for immediate follow-up discussed.    Advised the patient to follow-up with the primary care physician for recheck, reevaluation, and consideration of further management.    Please note that this dictation was created using voice recognition software. I have made a reasonable attempt to correct obvious errors, but I expect that there are errors of grammar and possibly content that I did not discover before finalizing the note.    This note was electronically signed by YOUSUF Lopez

## 2023-12-21 DIAGNOSIS — R11.0 NAUSEA: ICD-10-CM

## 2023-12-21 RX ORDER — ONDANSETRON 4 MG/1
4 TABLET, ORALLY DISINTEGRATING ORAL EVERY 6 HOURS PRN
Qty: 20 TABLET | Refills: 0 | Status: SHIPPED | OUTPATIENT
Start: 2023-12-21 | End: 2024-02-09 | Stop reason: SDUPTHER

## 2023-12-27 ENCOUNTER — OFFICE VISIT (OUTPATIENT)
Dept: MEDICAL GROUP | Facility: MEDICAL CENTER | Age: 28
End: 2023-12-27
Attending: FAMILY MEDICINE
Payer: COMMERCIAL

## 2023-12-27 VITALS
HEART RATE: 96 BPM | DIASTOLIC BLOOD PRESSURE: 72 MMHG | RESPIRATION RATE: 16 BRPM | BODY MASS INDEX: 40.4 KG/M2 | OXYGEN SATURATION: 97 % | SYSTOLIC BLOOD PRESSURE: 100 MMHG | HEIGHT: 61 IN | WEIGHT: 214 LBS | TEMPERATURE: 97.2 F

## 2023-12-27 DIAGNOSIS — Z11.59 NEED FOR HEPATITIS C SCREENING TEST: ICD-10-CM

## 2023-12-27 DIAGNOSIS — F33.1 MODERATE EPISODE OF RECURRENT MAJOR DEPRESSIVE DISORDER (HCC): ICD-10-CM

## 2023-12-27 DIAGNOSIS — R06.83 SNORING: ICD-10-CM

## 2023-12-27 DIAGNOSIS — E78.5 HYPERLIPIDEMIA, UNSPECIFIED HYPERLIPIDEMIA TYPE: ICD-10-CM

## 2023-12-27 DIAGNOSIS — E11.9 TYPE 2 DIABETES MELLITUS WITHOUT COMPLICATION, WITHOUT LONG-TERM CURRENT USE OF INSULIN (HCC): ICD-10-CM

## 2023-12-27 DIAGNOSIS — Z11.4 SCREENING FOR HIV (HUMAN IMMUNODEFICIENCY VIRUS): ICD-10-CM

## 2023-12-27 DIAGNOSIS — R53.82 CHRONIC FATIGUE: ICD-10-CM

## 2023-12-27 DIAGNOSIS — K52.9 CHRONIC DIARRHEA: ICD-10-CM

## 2023-12-27 PROCEDURE — 99213 OFFICE O/P EST LOW 20 MIN: CPT | Performed by: FAMILY MEDICINE

## 2023-12-27 PROCEDURE — 3074F SYST BP LT 130 MM HG: CPT | Performed by: FAMILY MEDICINE

## 2023-12-27 PROCEDURE — 99214 OFFICE O/P EST MOD 30 MIN: CPT | Performed by: FAMILY MEDICINE

## 2023-12-27 PROCEDURE — 3078F DIAST BP <80 MM HG: CPT | Performed by: FAMILY MEDICINE

## 2023-12-27 RX ORDER — SERTRALINE HYDROCHLORIDE 100 MG/1
150 TABLET, FILM COATED ORAL
Qty: 45 TABLET | Refills: 5 | Status: SHIPPED | OUTPATIENT
Start: 2023-12-27 | End: 2024-02-16

## 2023-12-27 NOTE — LETTER
[facility]  Nabila Mitchell M.D.  [address]  [place] [address]  Fax: [phone]   Authorization for Release/Disclosure of   Protected Health Information   Name: ANISHA NUÑEZ : 1995 SSN: [id number]   Address: [address] [place] [address] [address]  [place] [address] Phone:    [phone] (home)    I authorize the entity listed below to release/disclose the PHI below to:   [facility]/Nabila Mitchell M.D. and Nabila Mitchell M.D.   Provider or Entity Name:  [facility]    Address   City, [facility]   Phone:      Fax:     Reason for request: continuity of care   Information to be released:    [ X ] LAST COLONOSCOPY,  including any PATH REPORT and follow-up  [  ] LAST FIT/COLOGUARD RESULT [  ] LAST DEXA  [  ] LAST MAMMOGRAM  [  ] LAST PAP  [  ] LAST LABS [  ] RETINA EXAM REPORT  [  ] IMMUNIZATION RECORDS  [  ] Release all info      [  ] Check here and initial the line next to each item to release ALL health information INCLUDING  _____ Care and treatment for drug and / or alcohol abuse  _____ HIV testing, infection status, or AIDS  _____ Genetic Testing    DATES OF SERVICE OR TIME PERIOD TO BE DISCLOSED: _____________  I understand and acknowledge that:  * This Authorization may be revoked at any time by you in writing, except if your health information has already been used or disclosed.  * Your health information that will be used or disclosed as a result of you signing this authorization could be re-disclosed by the recipient. If this occurs, your re-disclosed health information may no longer be protected by State or Federal laws.  * You may refuse to sign this Authorization. Your refusal will not affect your ability to obtain treatment.  * This Authorization becomes effective upon signing and will  on (date) __________.      If no date is indicated, this Authorization will  one (1) year from the signature date.    Name: Anisha Nuñez  Signature: Date:   2023     PLEASE FAX  REQUESTED RECORDS BACK TO: [phone]

## 2023-12-27 NOTE — ASSESSMENT & PLAN NOTE
Pt reports that she was doing well on zoloft 100mg, increased last visit.   She reports feeling quite a bit better, also notes she had started a new job recently as well.   No si/hi. Thinks she still has some room to increase in her mood.

## 2023-12-27 NOTE — ASSESSMENT & PLAN NOTE
ongoing for years. Doesn't matter how much sleep  Snores when she sleeps. Takes naps during the day. Does not feel refreshed during the day. Does not wake in the night coughing/choking.   Chronic diarrheal issues  Takes a prenatal vitamin

## 2023-12-27 NOTE — ASSESSMENT & PLAN NOTE
Pt reports hx of chronic diarrhea.  Had a stomach bug a few weeks ago but this is resolved  She is having significant loose/shaggy BM every hour, maybe not as frequent while asleep.   She thinks switching metformin to XR has helped  Hx of colonoscopy in 2279-3142 and was recommended to get another in 5 years. This was done in [place].   She has not tried dietary changes at all. Has not noticed dietary patterns  She does not have a lot of cramping, but usually she does. Dicyclomine helps with this.   She has seen GI before 5-6 years ago and there was suspicion of IBS but pt reports no formal diagnosis. Reportedly colonoscopy biopsies were neg. Drinks a lot of water   Fam hx of cancer, mom with throat ca, grandma with unknown ca, uncle with lung/liver/pancreatic ca?

## 2023-12-29 ENCOUNTER — HOSPITAL ENCOUNTER (OUTPATIENT)
Dept: LAB | Facility: MEDICAL CENTER | Age: 28
End: 2023-12-29
Attending: FAMILY MEDICINE
Payer: COMMERCIAL

## 2023-12-29 DIAGNOSIS — E11.9 TYPE 2 DIABETES MELLITUS WITHOUT COMPLICATION, WITHOUT LONG-TERM CURRENT USE OF INSULIN (HCC): ICD-10-CM

## 2023-12-29 DIAGNOSIS — Z11.4 SCREENING FOR HIV (HUMAN IMMUNODEFICIENCY VIRUS): ICD-10-CM

## 2023-12-29 DIAGNOSIS — K52.9 CHRONIC DIARRHEA: ICD-10-CM

## 2023-12-29 DIAGNOSIS — E78.5 HYPERLIPIDEMIA, UNSPECIFIED HYPERLIPIDEMIA TYPE: ICD-10-CM

## 2023-12-29 DIAGNOSIS — Z11.59 NEED FOR HEPATITIS C SCREENING TEST: ICD-10-CM

## 2023-12-29 LAB
ALBUMIN SERPL BCP-MCNC: 4 G/DL (ref 3.2–4.9)
ALBUMIN/GLOB SERPL: 1.3 G/DL
ALP SERPL-CCNC: 58 U/L (ref 30–99)
ALT SERPL-CCNC: 19 U/L (ref 2–50)
ANION GAP SERPL CALC-SCNC: 14 MMOL/L (ref 7–16)
AST SERPL-CCNC: 12 U/L (ref 12–45)
BILIRUB SERPL-MCNC: 0.2 MG/DL (ref 0.1–1.5)
BUN SERPL-MCNC: 13 MG/DL (ref 8–22)
CALCIUM ALBUM COR SERPL-MCNC: 9.2 MG/DL (ref 8.5–10.5)
CALCIUM SERPL-MCNC: 9.2 MG/DL (ref 8.5–10.5)
CHLORIDE SERPL-SCNC: 101 MMOL/L (ref 96–112)
CHOLEST SERPL-MCNC: 175 MG/DL (ref 100–199)
CO2 SERPL-SCNC: 23 MMOL/L (ref 20–33)
CREAT SERPL-MCNC: 0.5 MG/DL (ref 0.5–1.4)
CREAT UR-MCNC: 83.49 MG/DL
CRP SERPL HS-MCNC: 0.66 MG/DL (ref 0–0.75)
ERYTHROCYTE [DISTWIDTH] IN BLOOD BY AUTOMATED COUNT: 46.8 FL (ref 35.9–50)
ERYTHROCYTE [SEDIMENTATION RATE] IN BLOOD BY WESTERGREN METHOD: 20 MM/HOUR (ref 0–25)
FERRITIN SERPL-MCNC: 204 NG/ML (ref 10–291)
GFR SERPLBLD CREATININE-BSD FMLA CKD-EPI: 129 ML/MIN/1.73 M 2
GLOBULIN SER CALC-MCNC: 3.1 G/DL (ref 1.9–3.5)
GLUCOSE SERPL-MCNC: 169 MG/DL (ref 65–99)
HCT VFR BLD AUTO: 39.6 % (ref 37–47)
HCV AB SER QL: NORMAL
HDLC SERPL-MCNC: 24 MG/DL
HGB BLD-MCNC: 13.2 G/DL (ref 12–16)
HIV 1+2 AB+HIV1 P24 AG SERPL QL IA: NORMAL
IRON SATN MFR SERPL: 21 % (ref 15–55)
IRON SERPL-MCNC: 64 UG/DL (ref 40–170)
LDLC SERPL CALC-MCNC: ABNORMAL MG/DL
MCH RBC QN AUTO: 30.1 PG (ref 27–33)
MCHC RBC AUTO-ENTMCNC: 33.3 G/DL (ref 32.2–35.5)
MCV RBC AUTO: 90.2 FL (ref 81.4–97.8)
MICROALBUMIN UR-MCNC: <1.2 MG/DL
MICROALBUMIN/CREAT UR: NORMAL MG/G (ref 0–30)
PLATELET # BLD AUTO: 262 K/UL (ref 164–446)
PMV BLD AUTO: 10.6 FL (ref 9–12.9)
POTASSIUM SERPL-SCNC: 4.2 MMOL/L (ref 3.6–5.5)
PROT SERPL-MCNC: 7.1 G/DL (ref 6–8.2)
RBC # BLD AUTO: 4.39 M/UL (ref 4.2–5.4)
SODIUM SERPL-SCNC: 138 MMOL/L (ref 135–145)
TIBC SERPL-MCNC: 309 UG/DL (ref 250–450)
TRIGL SERPL-MCNC: 739 MG/DL (ref 0–149)
TSH SERPL DL<=0.005 MIU/L-ACNC: 2.53 UIU/ML (ref 0.38–5.33)
UIBC SERPL-MCNC: 245 UG/DL (ref 110–370)
WBC # BLD AUTO: 7.1 K/UL (ref 4.8–10.8)

## 2023-12-29 PROCEDURE — 80061 LIPID PANEL: CPT

## 2023-12-29 PROCEDURE — 87389 HIV-1 AG W/HIV-1&-2 AB AG IA: CPT

## 2023-12-29 PROCEDURE — 86140 C-REACTIVE PROTEIN: CPT

## 2023-12-29 PROCEDURE — 84443 ASSAY THYROID STIM HORMONE: CPT

## 2023-12-29 PROCEDURE — 86803 HEPATITIS C AB TEST: CPT

## 2023-12-29 PROCEDURE — 36415 COLL VENOUS BLD VENIPUNCTURE: CPT

## 2023-12-29 PROCEDURE — 82570 ASSAY OF URINE CREATININE: CPT

## 2023-12-29 PROCEDURE — 85652 RBC SED RATE AUTOMATED: CPT

## 2023-12-29 PROCEDURE — 85027 COMPLETE CBC AUTOMATED: CPT

## 2023-12-29 PROCEDURE — 82728 ASSAY OF FERRITIN: CPT

## 2023-12-29 PROCEDURE — 83540 ASSAY OF IRON: CPT

## 2023-12-29 PROCEDURE — 83550 IRON BINDING TEST: CPT

## 2023-12-29 PROCEDURE — 80053 COMPREHEN METABOLIC PANEL: CPT

## 2023-12-29 PROCEDURE — 82043 UR ALBUMIN QUANTITATIVE: CPT

## 2023-12-30 NOTE — PROGRESS NOTES
Subjective:     CC:   Chief Complaint   Patient presents with    Follow-Up     Depression, gi         HPI:     Type 2 diabetes mellitus without complication, without long-term current use of insulin (HCC)  Metformin formulation switched to XR last visit and pt reports tolerating it better.     Moderate episode of recurrent major depressive disorder (HCC)  Pt reports that she was doing well on zoloft 100mg, increased last visit.   She reports feeling quite a bit better, also notes she had started a new job recently as well.   No si/hi. Thinks she still has some room to increase in her mood.     Chronic diarrhea  Pt reports hx of chronic diarrhea.  Had a stomach bug a few weeks ago but this is resolved  She is having significant loose/shaggy BM every hour, maybe not as frequent while asleep.   She thinks switching metformin to XR has helped  Hx of colonoscopy in 7709-8574 and was recommended to get another in 5 years. This was done in [place].   She has not tried dietary changes at all. Has not noticed dietary patterns  She does not have a lot of cramping, but usually she does. Dicyclomine helps with this.   She has seen GI before 5-6 years ago and there was suspicion of IBS but pt reports no formal diagnosis. Reportedly colonoscopy biopsies were neg. Drinks a lot of water   Fam hx of cancer, mom with throat ca, grandma with unknown ca, uncle with lung/liver/pancreatic ca?    Chronic fatigue  ongoing for years. Doesn't matter how much sleep  Snores when she sleeps. Takes naps during the day. Does not feel refreshed during the day. Does not wake in the night coughing/choking.   Chronic diarrheal issues  Takes a prenatal vitamin     Past Medical History:   Diagnosis Date    ASTHMA     Asthma     Carpal tunnel syndrome     Diabetes (HCC)     Hyperlipidemia     Morbid obesity (HCC)     PCOS (polycystic ovarian syndrome)     Vitamin D deficiency        Social History     Tobacco Use    Smoking status: Former     Current  packs/day: 0.00     Average packs/day: 0.3 packs/day for 15.0 years (3.8 ttl pk-yrs)     Types: Cigarettes     Start date: 2/15/2006     Quit date: 2/15/2021     Years since quittin.8    Smokeless tobacco: Never   Vaping Use    Vaping Use: Every day    Substances: Nicotine, Flavoring    Devices: Disposable   Substance Use Topics    Alcohol use: Yes     Comment: 1/2 bottle of wine every 2 months    Drug use: Not Currently     Types: Methamphetamines, Inhaled     Comment: history of meth from 5019-0955, no IV, one use a couple months ago        Current Outpatient Medications Ordered in Epic   Medication Sig Dispense Refill    sertraline (ZOLOFT) 100 MG Tab Take 1.5 Tablets by mouth every day. 45 Tablet 5    ondansetron (ZOFRAN ODT) 4 MG TABLET DISPERSIBLE Take 1 Tablet by mouth every 6 hours as needed for Nausea/Vomiting. 20 Tablet 0    albuterol (PROVENTIL HFA) 108 (90 Base) MCG/ACT Aero Soln inhalation aerosol Inhale 2 Puffs every 6 hours as needed for Shortness of Breath. 8.5 g 5    prenatal vitamin with Fe/FA (SANDOR PRENATAL) 28-0.8 MG Tab Take 1 Tablet by mouth every day. 30 Tablet 5    famotidine (PEPCID) 20 MG Tab Take 1 Tablet by mouth 2 times a day. 60 Tablet 5    dicyclomine (BENTYL) 20 MG Tab Take 1 Tablet by mouth 3 times a day before meals. 30 Tablet 5    Dulaglutide (TRULICITY) 1.5 MG/0.5ML Solution Pen-injector Inject 0.5 mL under the skin every 7 days. 2.24 mL 5    loperamide (IMODIUM) 2 MG Cap Take 1 Capsule by mouth 1 time a day as needed for Diarrhea. 30 Capsule 2    metFORMIN ER (GLUCOPHAGE XR) 500 MG TABLET SR 24 HR Take 1 Tablet by mouth every day. 30 Tablet 5    hydrocortisone 2.5 % Cream topical cream Apply thin layer to areas of eczema daily as needed 30 g 3    Blood Glucose Meter Kit Test blood sugar as recommended by provider. 1 Kit 0    Blood Glucose Test Strips Test glucoses daily in the morning fasting 100 Each 5    Lancets Use one to check glucoses daily morning before breakfast 100  "Each 5     No current [facility]-ordered facility-administered medications on file.       Allergies:  Abilify, Norco [apap-fd&c blue #1-hydrocodone], Norco [hydrocodone-acetaminophen], and Prazosin        Objective:       Exam:  /72 (BP Location: Left arm, Patient Position: Sitting)   Pulse 96   Temp 36.2 °C (97.2 °F) (Temporal)   Resp 16   Ht 1.549 m (5' 1\")   Wt 97.1 kg (214 lb)   SpO2 97%   BMI 40.43 kg/m²  Body mass index is 40.43 kg/m².    General: Normal appearing. No distress.  Constitutional: Alert, no distress, well-groomed.  Respiratory: Unlabored respiratory effort, no cough.  MSK: moves all extremities.  Psych: normal affect and mood.      Data reviewed:   Past lipids showing elevated TG    Assessment & Plan:     28 y.o. female with the following -     1. Type 2 diabetes mellitus without complication, without long-term current use of insulin (HCC)  - MICROALBUMIN CREAT RATIO URINE; Future  Patient is tolerating metformin XR much better. Will check A1c in 1-2 mos    2. Moderate episode of recurrent major depressive disorder (HCC)  - sertraline (ZOLOFT) 100 MG Tab; Take 1.5 Tablets by mouth every day.  Dispense: 45 Tablet; Refill: 5  - Referral to Psychology  Did well on higher dose of zoloft but would like to continue to increase as she feels she can get more benefit out of it. Increase to zoloft 150mg.    3. Chronic diarrhea  - Referral to Gastroenterology  - CALPROTECTIN,FECAL; Future  - CULTURE STOOL; Future  - CBC WITHOUT DIFFERENTIAL; Future  - IRON/TOTAL IRON BIND; Future  - FERRITIN; Future  - Comp Metabolic Panel; Future  - CRP QUANTITIVE (NON-CARDIAC); Future  - Sed Rate; Future  - OCCULT BLOOD FECES IMMUNOASSAY; Future  - TSH WITH REFLEX TO FT4; Future  Labs/stool studies as above. Concerning for hematochezia but she reports that this occurs after multiple Bms in one day. She certainly does have frequency. Recommend starting metamucil, peppermint oil in case this is IBS related. Could " consider rifaximin x 2 weeks if workup is neg    4. Hyperlipidemia, unspecified hyperlipidemia type  - Lipid Profile; Future    5. Screening for HIV (human immunodeficiency virus)  - HIV AG/AB COMBO ASSAY SCREENING; Future    6. Need for hepatitis C screening test  - HEP C VIRUS ANTIBODY; Future    7. Chronic fatigue  - Referral to Pulmonary and Sleep Medicine  Could certainly be multifactorial. Positive screen for sleep apnea so would recommend completing sleep study. Could be related to frequent diarrhea     8. Snoring  - Referral to Pulmonary and Sleep Medicine      Return in about 6 weeks (around 2/7/2024).    Please note that this dictation was created using voice recognition software. I have made every reasonable attempt to correct obvious errors, but I expect that there are errors of grammar and possibly content that I did not discover before finalizing the note.

## 2024-01-05 ENCOUNTER — HOSPITAL ENCOUNTER (OUTPATIENT)
Facility: MEDICAL CENTER | Age: 29
End: 2024-01-05
Attending: FAMILY MEDICINE
Payer: COMMERCIAL

## 2024-01-05 DIAGNOSIS — E11.9 TYPE 2 DIABETES MELLITUS WITHOUT COMPLICATION, WITHOUT LONG-TERM CURRENT USE OF INSULIN (HCC): ICD-10-CM

## 2024-01-05 DIAGNOSIS — K52.9 CHRONIC DIARRHEA: ICD-10-CM

## 2024-01-05 PROCEDURE — 87077 CULTURE AEROBIC IDENTIFY: CPT

## 2024-01-05 PROCEDURE — 87899 AGENT NOS ASSAY W/OPTIC: CPT

## 2024-01-05 PROCEDURE — 87045 FECES CULTURE AEROBIC BACT: CPT

## 2024-01-05 PROCEDURE — 82274 ASSAY TEST FOR BLOOD FECAL: CPT

## 2024-01-05 PROCEDURE — 87046 STOOL CULTR AEROBIC BACT EA: CPT

## 2024-01-05 PROCEDURE — 83993 ASSAY FOR CALPROTECTIN FECAL: CPT

## 2024-01-06 LAB
E COLI SXT1+2 STL IA: NORMAL
SIGNIFICANT IND 70042: NORMAL
SITE SITE: NORMAL
SOURCE SOURCE: NORMAL

## 2024-01-07 LAB — CALPROTECTIN STL-MCNT: 13 UG/G

## 2024-01-08 LAB
BACTERIA STL CULT: NORMAL
E COLI SXT1+2 STL IA: NORMAL
SIGNIFICANT IND 70042: NORMAL
SITE SITE: NORMAL
SOURCE SOURCE: NORMAL

## 2024-01-08 RX ORDER — DULAGLUTIDE 1.5 MG/.5ML
0.5 INJECTION, SOLUTION SUBCUTANEOUS
Qty: 2 ML | Refills: 5 | Status: SHIPPED | OUTPATIENT
Start: 2024-01-08 | End: 2024-01-12 | Stop reason: SDUPTHER

## 2024-01-10 NOTE — TELEPHONE ENCOUNTER
DOCUMENTATION OF PAR STATUS:    1. Name of Medication & Dose:  Dulaglutide (TRULICITY) 1.5 MG/0.5ML Solution Pen-injector     2. Name of Prescription Coverage Company & phone #: [email or web address]    3. Date Prior Auth Submitted: 1/10/24    4. What information was given to obtain insurance decision? Chart notes,icd-10 code, med hx.    5. Prior Auth Status? Pending    6. Patient Notified: N\A

## 2024-01-11 LAB — IMM ASSAY OCC BLD FITOB: NEGATIVE

## 2024-01-12 ENCOUNTER — TELEMEDICINE (OUTPATIENT)
Dept: MEDICAL GROUP | Facility: MEDICAL CENTER | Age: 29
End: 2024-01-12
Attending: FAMILY MEDICINE
Payer: COMMERCIAL

## 2024-01-12 ENCOUNTER — TELEPHONE (OUTPATIENT)
Dept: MEDICAL GROUP | Facility: MEDICAL CENTER | Age: 29
End: 2024-01-12

## 2024-01-12 VITALS — WEIGHT: 214 LBS | HEIGHT: 61 IN | BODY MASS INDEX: 40.4 KG/M2 | RESPIRATION RATE: 14 BRPM

## 2024-01-12 DIAGNOSIS — E78.1 HYPERTRIGLYCERIDEMIA: ICD-10-CM

## 2024-01-12 DIAGNOSIS — E11.9 TYPE 2 DIABETES MELLITUS WITHOUT COMPLICATION, WITHOUT LONG-TERM CURRENT USE OF INSULIN (HCC): ICD-10-CM

## 2024-01-12 PROCEDURE — 99212 OFFICE O/P EST SF 10 MIN: CPT | Performed by: FAMILY MEDICINE

## 2024-01-12 PROCEDURE — 99214 OFFICE O/P EST MOD 30 MIN: CPT | Mod: 95 | Performed by: FAMILY MEDICINE

## 2024-01-12 RX ORDER — ATORVASTATIN CALCIUM 10 MG/1
10 TABLET, FILM COATED ORAL NIGHTLY
Qty: 30 TABLET | Refills: 5 | Status: SHIPPED | OUTPATIENT
Start: 2024-01-12

## 2024-01-12 RX ORDER — DULAGLUTIDE 1.5 MG/.5ML
0.5 INJECTION, SOLUTION SUBCUTANEOUS
Qty: 2 ML | Refills: 5 | Status: SHIPPED | OUTPATIENT
Start: 2024-01-12 | End: 2024-01-24

## 2024-01-12 RX ORDER — FENOFIBRATE 67 MG/1
67 CAPSULE ORAL
Qty: 30 CAPSULE | Refills: 5 | Status: SHIPPED | OUTPATIENT
Start: 2024-01-12

## 2024-01-12 ASSESSMENT — FIBROSIS 4 INDEX: FIB4 SCORE: 0.32

## 2024-01-12 NOTE — TELEPHONE ENCOUNTER
MEDICATION PRIOR AUTHORIZATION NEEDED:    1. Name of Medication: trulicity    2. Requested By (Name of Pharmacy): [facility]     3. Is insurance on file current? yes    4. What is the name & phone number of the 3rd party payor? Sydnee GARCIA faxed to [email or web address]

## 2024-01-12 NOTE — PROGRESS NOTES
Telemedicine: Established Patient   This evaluation was conducted via Zoom using secure and encrypted videoconferencing technology. The patient was in their home in the [facility].    The patient's identity was confirmed and verbal consent was obtained for this virtual visit.    Subjective:   CC:   Chief Complaint   Patient presents with    Follow-Up       Anisha Tristan is a 28 y.o. female presenting for evaluation and management of:    Type 2 diabetes mellitus without complication, without long-term current use of insulin (HCC)  Pt is unable to obtain trulicity from pharmacies, but was also notified that it is not covered by her insurance.   She has been using it regularly and obtained it last month.   She is late by 4 days.   She admits that with her limited income it is difficult to buy foods that are low carb and eat enough.       Hypertriglyceridemia  Recent lipids show severely elevated TG in the 700s.   Previously it was up in the 400s, was not on any medications for that at the time. She has been in the past  She admits to poor diet with high glycemic load.   HDL is low as well          ROS      Allergies   Allergen Reactions    Abilify Unspecified     Pt states that the medication affects her mobility, room starts spinning, unable to walk or use hands or feet, black outs    Norco [Apap-Fd&C Blue #1-Hydrocodone] Vomiting    Norco [Hydrocodone-Acetaminophen] Vomiting    Prazosin        Current medicines (including changes today)  Current Outpatient Medications   Medication Sig Dispense Refill    Dulaglutide (TRULICITY) 1.5 MG/0.5ML Solution Pen-injector Inject 0.5 mL under the skin every 7 days. 2 mL 5    atorvastatin (LIPITOR) 10 MG Tab Take 1 Tablet by mouth every evening. 30 Tablet 5    fenofibrate micronized (LOFIBRA) 67 MG capsule Take 1 Capsule by mouth every morning before breakfast. 30 Capsule 5    sertraline (ZOLOFT) 100 MG Tab Take 1.5 Tablets by mouth every day. 45 Tablet 5    ondansetron  (ZOFRAN ODT) 4 MG TABLET DISPERSIBLE Take 1 Tablet by mouth every 6 hours as needed for Nausea/Vomiting. 20 Tablet 0    albuterol (PROVENTIL HFA) 108 (90 Base) MCG/ACT Aero Soln inhalation aerosol Inhale 2 Puffs every 6 hours as needed for Shortness of Breath. 8.5 g 5    prenatal vitamin with Fe/FA (SANDOR PRENATAL) 28-0.8 MG Tab Take 1 Tablet by mouth every day. 30 Tablet 5    famotidine (PEPCID) 20 MG Tab Take 1 Tablet by mouth 2 times a day. 60 Tablet 5    dicyclomine (BENTYL) 20 MG Tab Take 1 Tablet by mouth 3 times a day before meals. 30 Tablet 5    loperamide (IMODIUM) 2 MG Cap Take 1 Capsule by mouth 1 time a day as needed for Diarrhea. 30 Capsule 2    metFORMIN ER (GLUCOPHAGE XR) 500 MG TABLET SR 24 HR Take 1 Tablet by mouth every day. 30 Tablet 5    hydrocortisone 2.5 % Cream topical cream Apply thin layer to areas of eczema daily as needed 30 g 3    Blood Glucose Meter Kit Test blood sugar as recommended by provider. 1 Kit 0    Blood Glucose Test Strips Test glucoses daily in the morning fasting 100 Each 5    Lancets Use one to check glucoses daily morning before breakfast 100 Each 5     No current facility-administered medications for this visit.       Patient Active Problem List    Diagnosis Date Noted    Chronic fatigue 12/27/2023    Encounter for initial prescription of contraceptive pills 03/10/2021    Infertility associated with anovulation 09/23/2019    Dyspareunia in female 09/23/2019    Chronic diarrhea 06/28/2019    Gallstones 06/28/2019    [place] 06/28/2019    Type 2 diabetes mellitus without complication, without long-term current use of insulin (HCC) 05/28/2019    Anxiety 05/28/2019    PCOS (polycystic ovarian syndrome) 05/28/2019    Mild intermittent asthma without complication 05/28/2019    GERD (gastroesophageal reflux disease) 05/28/2019    Moderate episode of recurrent major depressive disorder (HCC) 05/28/2019    Bilateral carpal tunnel syndrome 05/28/2019    Hypertriglyceridemia  "05/28/2019    Vitamin D deficiency 05/28/2019    Class 3 severe obesity due to excess calories with serious comorbidity and body mass index (BMI) of 40.0 to 44.9 in adult (HCC) 05/28/2019    Eczema 05/28/2019       Family History   Problem Relation Age of Onset    Cancer Mother         throat    Alcohol/Drug Mother     Cancer Maternal Uncle     Diabetes Maternal Grandmother     Hypertension Maternal Grandmother     Heart Disease Neg Hx     Stroke Neg Hx        She  has a past medical history of ASTHMA, Asthma, Carpal tunnel syndrome, Diabetes ([facility]), Hyperlipidemia, Morbid obesity ([facility]), PCOS (polycystic ovarian syndrome), and Vitamin D deficiency.  She  has a past surgical history that includes eye surgery; open reduction; carpal tunnel release (Bilateral); and orif, ankle (Right, 2010).       Objective:   Resp 14   Ht 1.549 m (5' 1\")   Wt 97.1 kg (214 lb)   BMI 40.43 kg/m²     Physical Exam  Constitutional: Alert, no distress, well-groomed.  Respiratory: Unlabored respiratory effort, no cough.  MSK: moves all extremities.  Psych: normal affect and mood.    Data reviewed:  Last labs 12/29/23 - lipids elevated  Microalbumin/cr normal  Stool studies normal  Cbc/cmp normal      Assessment and Plan:   The following treatment plan was discussed:     1. Type 2 diabetes mellitus without complication, without long-term current use of insulin ([facility])  - Dulaglutide (TRULICITY) 1.5 MG/0.5ML Solution Pen-injector; Inject 0.5 mL under the skin every 7 days.  Dispense: 2 mL; Refill: 5  - Referral to Nutrition Services  Will attempt PA  If this does not work will need to do victoza  Referral to nutrition    2. Hypertriglyceridemia  - atorvastatin (LIPITOR) 10 MG Tab; Take 1 Tablet by mouth every evening.  Dispense: 30 Tablet; Refill: 5  - fenofibrate micronized (LOFIBRA) 67 MG capsule; Take 1 Capsule by mouth every morning before breakfast.  Dispense: 30 Capsule; Refill: 5  - Referral to Nutrition Services  Starting on lipitor " and also low dose fenofibrate  Strongly counseled on need to lower glycemic load. Insulin resistance also contributes to elevated TG        Follow-up: Return in about 1 month (around 2/12/2024) for f/u dm.

## 2024-01-12 NOTE — ASSESSMENT & PLAN NOTE
Pt is unable to obtain trulicity from pharmacies, but was also notified that it is not covered by her insurance.   She has been using it regularly and obtained it last month.   She is late by 4 days.   She admits that with her limited income it is difficult to buy foods that are low carb and eat enough.

## 2024-01-12 NOTE — ASSESSMENT & PLAN NOTE
Recent lipids show severely elevated TG in the 700s.   Previously it was up in the 400s, was not on any medications for that at the time. She has been in the past  She admits to poor diet with high glycemic load.   HDL is low as well

## 2024-01-24 ENCOUNTER — OFFICE VISIT (OUTPATIENT)
Dept: MEDICAL GROUP | Facility: MEDICAL CENTER | Age: 29
End: 2024-01-24
Attending: FAMILY MEDICINE
Payer: COMMERCIAL

## 2024-01-24 ENCOUNTER — PHARMACY VISIT (OUTPATIENT)
Dept: PHARMACY | Facility: MEDICAL CENTER | Age: 29
End: 2024-01-24
Payer: COMMERCIAL

## 2024-01-24 VITALS
BODY MASS INDEX: 40.02 KG/M2 | OXYGEN SATURATION: 97 % | HEIGHT: 61 IN | WEIGHT: 212 LBS | RESPIRATION RATE: 14 BRPM | TEMPERATURE: 97.6 F | SYSTOLIC BLOOD PRESSURE: 120 MMHG | HEART RATE: 69 BPM | DIASTOLIC BLOOD PRESSURE: 80 MMHG

## 2024-01-24 DIAGNOSIS — E11.9 TYPE 2 DIABETES MELLITUS WITHOUT COMPLICATION, WITHOUT LONG-TERM CURRENT USE OF INSULIN (HCC): ICD-10-CM

## 2024-01-24 PROCEDURE — RXMED WILLOW AMBULATORY MEDICATION CHARGE: Performed by: FAMILY MEDICINE

## 2024-01-24 PROCEDURE — 3074F SYST BP LT 130 MM HG: CPT | Performed by: FAMILY MEDICINE

## 2024-01-24 PROCEDURE — 99213 OFFICE O/P EST LOW 20 MIN: CPT | Performed by: FAMILY MEDICINE

## 2024-01-24 PROCEDURE — 3079F DIAST BP 80-89 MM HG: CPT | Performed by: FAMILY MEDICINE

## 2024-01-24 RX ORDER — DIPHENHYDRAMINE HYDROCHLORIDE 25 MG/1
CAPSULE, LIQUID FILLED ORAL
Qty: 1 KIT | Refills: 0 | Status: SHIPPED | OUTPATIENT
Start: 2024-01-24

## 2024-01-24 RX ORDER — LIRAGLUTIDE 6 MG/ML
0.6 INJECTION SUBCUTANEOUS DAILY
Qty: 6 ML | Refills: 5 | Status: SHIPPED | OUTPATIENT
Start: 2024-01-24

## 2024-01-24 RX ORDER — CALCIUM CITRATE/VITAMIN D3 200MG-6.25
TABLET ORAL
Qty: 100 STRIP | Refills: 5 | OUTPATIENT
Start: 2024-01-24

## 2024-01-24 ASSESSMENT — FIBROSIS 4 INDEX: FIB4 SCORE: 0.32

## 2024-01-24 NOTE — ASSESSMENT & PLAN NOTE
Patient has not been able to get trulicity. Out of stock. Approved by insurance  She has been without it for a while now. Does not feel well and feels like BG is high. Cannot find her glucometer

## 2024-01-24 NOTE — LETTER
[facility]  Nabila Mitchell M.D.  [address]  [place] [address]  Fax: [phone]   Authorization for Release/Disclosure of   Protected Health Information   Name: ANISHA NUÑEZ : 1995 SSN: [id number]   Address: [address] [place] [address] [address]  [place] [address] Phone:    [phone] (home)    I authorize the entity listed below to release/disclose the PHI below to:   [facility]/Nabila Mitchell M.D. and Nabila Mitchell M.D.   Provider or Entity Name:  [facility]    Address   [facility], [address]  [address], [place] [address]  Phone:      Fax:     Reason for request: continuity of care   Information to be released:    [  ] LAST COLONOSCOPY,  including any PATH REPORT and follow-up  [  ] LAST FIT/COLOGUARD RESULT [  ] LAST DEXA  [  ] LAST MAMMOGRAM  [  ] LAST PAP  [  ] LAST LABS [ X ] RETINA EXAM REPORT  [  ] IMMUNIZATION RECORDS  [  ] Release all info      [  ] Check here and initial the line next to each item to release ALL health information INCLUDING  _____ Care and treatment for drug and / or alcohol abuse  _____ HIV testing, infection status, or AIDS  _____ Genetic Testing    DATES OF SERVICE OR TIME PERIOD TO BE DISCLOSED: _____________  I understand and acknowledge that:  * This Authorization may be revoked at any time by you in writing, except if your health information has already been used or disclosed.  * Your health information that will be used or disclosed as a result of you signing this authorization could be re-disclosed by the recipient. If this occurs, your re-disclosed health information may no longer be protected by State or Federal laws.  * You may refuse to sign this Authorization. Your refusal will not affect your ability to obtain treatment.  * This Authorization becomes effective upon signing and will  on (date) __________.      If no date is indicated, this Authorization will  one (1) year from the signature date.    Name: Anisha Nuñez  Signature: Date:    1/24/2024     PLEASE FAX REQUESTED RECORDS BACK TO: [phone]

## 2024-01-25 NOTE — PROGRESS NOTES
Subjective:     CC:   Chief Complaint   Patient presents with    Medication Management         HPI:     Type 2 diabetes mellitus without complication, without long-term current use of insulin (HCC)  Patient has not been able to get trulicity. Out of stock. Approved by insurance  She has been without it for a while now. Does not feel well and feels like BG is high. Cannot find her glucometer      Past Medical History:   Diagnosis Date    ASTHMA     Asthma     Carpal tunnel syndrome     Diabetes (HCC)     Hyperlipidemia     Morbid obesity (HCC)     PCOS (polycystic ovarian syndrome)     Vitamin D deficiency        Social History     Tobacco Use    Smoking status: Former     Current packs/day: 0.00     Average packs/day: 0.3 packs/day for 15.0 years (3.8 ttl pk-yrs)     Types: Cigarettes     Start date: 2/15/2006     Quit date: 2/15/2021     Years since quittin.9    Smokeless tobacco: Never   Vaping Use    Vaping Use: Every day    Substances: Nicotine, Flavoring    Devices: Disposable   Substance Use Topics    Alcohol use: Yes     Comment: 1/2 bottle of wine every 2 months    Drug use: Not Currently     Types: Methamphetamines, Inhaled     Comment: history of meth from 9743-1802, no IV, one use a couple months ago        Current Outpatient Medications Ordered in Epic   Medication Sig Dispense Refill    liraglutide (VICTOZA) 18 MG/3ML Solution Pen-injector Inject 0.6 mg under the skin every day. After 1 week increase to 1.2mg daily 6 mL 5    Insulin Pen Needle 32 G x 4 mm Use 1 Each every day. 100 Each 2    Blood Glucose Monitoring Suppl (BLOOD GLUCOSE MONITOR SYSTEM) w/Device Kit Test blood sugar as recommended by provider. 1 Kit 0    glucose blood strip Test glucose daily in the morning fasting 100 Strip 5    atorvastatin (LIPITOR) 10 MG Tab Take 1 Tablet by mouth every evening. 30 Tablet 5    fenofibrate micronized (LOFIBRA) 67 MG capsule Take 1 Capsule by mouth every morning before breakfast. 30 Capsule 5     "sertraline (ZOLOFT) 100 MG Tab Take 1.5 Tablets by mouth every day. 45 Tablet 5    ondansetron (ZOFRAN ODT) 4 MG TABLET DISPERSIBLE Take 1 Tablet by mouth every 6 hours as needed for Nausea/Vomiting. 20 Tablet 0    albuterol (PROVENTIL HFA) 108 (90 Base) MCG/ACT Aero Soln inhalation aerosol Inhale 2 Puffs every 6 hours as needed for Shortness of Breath. 8.5 g 5    prenatal vitamin with Fe/FA (SANDOR PRENATAL) 28-0.8 MG Tab Take 1 Tablet by mouth every day. 30 Tablet 5    famotidine (PEPCID) 20 MG Tab Take 1 Tablet by mouth 2 times a day. 60 Tablet 5    dicyclomine (BENTYL) 20 MG Tab Take 1 Tablet by mouth 3 times a day before meals. 30 Tablet 5    loperamide (IMODIUM) 2 MG Cap Take 1 Capsule by mouth 1 time a day as needed for Diarrhea. 30 Capsule 2    metFORMIN ER (GLUCOPHAGE XR) 500 MG TABLET SR 24 HR Take 1 Tablet by mouth every day. 30 Tablet 5    hydrocortisone 2.5 % Cream topical cream Apply thin layer to areas of eczema daily as needed 30 g 3    Lancets Use one to check glucoses daily morning before breakfast 100 Each 5     No current Epic-ordered facility-administered medications on file.       Allergies:  Abilify, Norco [apap-fd&c blue #1-hydrocodone], Norco [hydrocodone-acetaminophen], and Prazosin        Objective:       Exam:  /80 (BP Location: Left arm, Patient Position: Sitting)   Pulse 69   Temp 36.4 °C (97.6 °F) (Temporal)   Resp 14   Ht 1.549 m (5' 1\")   Wt 96.2 kg (212 lb)   SpO2 97%   BMI 40.06 kg/m²  Body mass index is 40.06 kg/m².    Constitutional: Alert, no distress, well-groomed.  Respiratory: Unlabored respiratory effort, no cough.  MSK: moves all extremities.  Psych: normal affect and mood.      Data reviewed:   None relevant    Assessment & Plan:     28 y.o. female with the following -     1. Type 2 diabetes mellitus without complication, without long-term current use of insulin (HCC)  - liraglutide (VICTOZA) 18 MG/3ML Solution Pen-injector; Inject 0.6 mg under the skin every " day. After 1 week increase to 1.2mg daily  Dispense: 6 mL; Refill: 5  - Insulin Pen Needle 32 G x 4 mm; Use 1 Each every day.  Dispense: 100 Each; Refill: 2  - Blood Glucose Monitoring Suppl (BLOOD GLUCOSE MONITOR SYSTEM) w/Device Kit; Test blood sugar as recommended by provider.  Dispense: 1 Kit; Refill: 0  - glucose blood strip; Test glucose daily in the morning fasting  Dispense: 100 Strip; Refill: 5  I discussed with pt the frequent supply issues with both trulicity and ozempic. I discussed that we could try the daily GLP1RA instead so that she wont't have to deal with losing her medicine again due to supply. She is eager to not have to go through this supply issue again so she will try victoza. Start at 0.6mg daily then increase to 1.2mg daily after 1 week.   Recheck BG next visit - reordered meter as she can't find hers.     Return in about 1 month (around 2/24/2024) for pap, f/u bg.    Please note that this dictation was created using voice recognition software. I have made every reasonable attempt to correct obvious errors, but I expect that there are errors of grammar and possibly content that I did not discover before finalizing the note.

## 2024-01-30 ENCOUNTER — TELEPHONE (OUTPATIENT)
Dept: MEDICAL GROUP | Facility: MEDICAL CENTER | Age: 29
End: 2024-01-30
Payer: COMMERCIAL

## 2024-01-31 NOTE — TELEPHONE ENCOUNTER
MEDICATION PRIOR AUTHORIZATION NEEDED:    1. Name of Medication: victoza    2. Requested By (Name of Pharmacy): [facility]     3. Is insurance on file current? yes    4. What is the name & phone number of the 3rd party payor? Sydnee GARCIA sent through cover my meds

## 2024-02-02 ENCOUNTER — HOSPITAL ENCOUNTER (EMERGENCY)
Facility: MEDICAL CENTER | Age: 29
End: 2024-02-02
Attending: STUDENT IN AN ORGANIZED HEALTH CARE EDUCATION/TRAINING PROGRAM
Payer: COMMERCIAL

## 2024-02-02 VITALS
TEMPERATURE: 97.5 F | OXYGEN SATURATION: 98 % | BODY MASS INDEX: 41.17 KG/M2 | RESPIRATION RATE: 18 BRPM | DIASTOLIC BLOOD PRESSURE: 70 MMHG | HEIGHT: 61 IN | WEIGHT: 218.03 LBS | HEART RATE: 76 BPM | SYSTOLIC BLOOD PRESSURE: 124 MMHG

## 2024-02-02 DIAGNOSIS — H65.01 NON-RECURRENT ACUTE SEROUS OTITIS MEDIA OF RIGHT EAR: ICD-10-CM

## 2024-02-02 PROCEDURE — 99283 EMERGENCY DEPT VISIT LOW MDM: CPT

## 2024-02-02 PROCEDURE — 700102 HCHG RX REV CODE 250 W/ 637 OVERRIDE(OP): Mod: UD | Performed by: STUDENT IN AN ORGANIZED HEALTH CARE EDUCATION/TRAINING PROGRAM

## 2024-02-02 PROCEDURE — A9270 NON-COVERED ITEM OR SERVICE: HCPCS | Mod: UD | Performed by: STUDENT IN AN ORGANIZED HEALTH CARE EDUCATION/TRAINING PROGRAM

## 2024-02-02 RX ORDER — AMOXICILLIN 500 MG/1
1000 CAPSULE ORAL ONCE
Status: DISCONTINUED | OUTPATIENT
Start: 2024-02-02 | End: 2024-02-02

## 2024-02-02 RX ORDER — ACETAMINOPHEN 500 MG
1000 TABLET ORAL ONCE
Status: COMPLETED | OUTPATIENT
Start: 2024-02-02 | End: 2024-02-02

## 2024-02-02 RX ORDER — AMOXICILLIN 500 MG/1
500 CAPSULE ORAL ONCE
Status: COMPLETED | OUTPATIENT
Start: 2024-02-02 | End: 2024-02-02

## 2024-02-02 RX ORDER — IBUPROFEN 600 MG/1
600 TABLET ORAL ONCE
Status: COMPLETED | OUTPATIENT
Start: 2024-02-02 | End: 2024-02-02

## 2024-02-02 RX ORDER — AMOXICILLIN 500 MG/1
500 CAPSULE ORAL 3 TIMES DAILY
Qty: 15 CAPSULE | Refills: 0 | Status: ACTIVE | OUTPATIENT
Start: 2024-02-02 | End: 2024-02-07

## 2024-02-02 RX ADMIN — ACETAMINOPHEN 1000 MG: 500 TABLET ORAL at 23:22

## 2024-02-02 RX ADMIN — AMOXICILLIN 500 MG: 500 CAPSULE ORAL at 23:22

## 2024-02-02 RX ADMIN — IBUPROFEN 600 MG: 600 TABLET, FILM COATED ORAL at 23:23

## 2024-02-02 ASSESSMENT — FIBROSIS 4 INDEX: FIB4 SCORE: 0.32

## 2024-02-03 RX ORDER — ACETAMINOPHEN 500 MG
500-1000 TABLET ORAL EVERY 6 HOURS PRN
Qty: 30 TABLET | Refills: 0 | Status: SHIPPED | OUTPATIENT
Start: 2024-02-03

## 2024-02-03 RX ORDER — IBUPROFEN 600 MG/1
600 TABLET ORAL EVERY 6 HOURS PRN
Qty: 30 TABLET | Refills: 0 | Status: SHIPPED | OUTPATIENT
Start: 2024-02-03

## 2024-02-03 NOTE — DISCHARGE INSTRUCTIONS
Take antibiotics as prescribed 3 times a day for the next 5 days and follow-up with your doctor in 1 week for recheck.

## 2024-02-03 NOTE — ED TRIAGE NOTES
"Chief Complaint   Patient presents with    Hearing Loss     X2 hours. Pt endorses hearing loss in R ear.     Pt ambulatory to triage for above complaint. Pt states she can still hear muffled tones out of ear but very distant. Denies any trauma prior to hearing loss.     Placed back in lobby and educated on triage process. Encouraged to alert staff to any changes.     A+Ox4, GCS 15, NAD.    /75   Pulse 79   Temp 36.1 °C (97 °F) (Temporal)   Resp 16   Ht 1.549 m (5' 1\")   Wt 98.9 kg (218 lb 0.6 oz)   SpO2 99%   BMI 41.20 kg/m²      "

## 2024-02-03 NOTE — ED PROVIDER NOTES
ED Provider Note    CHIEF COMPLAINT  Chief Complaint   Patient presents with    Hearing Loss     X2 hours. Pt endorses hearing loss in R ear.       EXTERNAL RECORDS REVIEWED  Outpatient Notes patient seen by family practitioner 2024 for medication management.  Noted history of type 2 diabetes and required medication refill at that time.    HPI/ROS  LIMITATION TO HISTORY   Select: : None      Anisha Tristan is a 28 y.o. female who presents to the emergency department for evaluation of right-sided hearing loss and pain.  Symptoms for the last 4 hours gradually worsening.  She denies any trauma to her ear or putting anything in her ear.  She denies fevers or chills, headache or dizziness.  She denies similar symptoms previously.    PAST MEDICAL HISTORY   has a past medical history of ASTHMA, Asthma, Carpal tunnel syndrome, Diabetes (HCC), Hyperlipidemia, Morbid obesity (HCC), PCOS (polycystic ovarian syndrome), and Vitamin D deficiency.    SURGICAL HISTORY   has a past surgical history that includes eye surgery; open reduction; carpal tunnel release (Bilateral); and orif, ankle (Right, 2010).    FAMILY HISTORY  Family History   Problem Relation Age of Onset    Cancer Mother         throat    Alcohol/Drug Mother     Cancer Maternal Uncle     Diabetes Maternal Grandmother     Hypertension Maternal Grandmother     Heart Disease Neg Hx     Stroke Neg Hx        SOCIAL HISTORY  Social History     Tobacco Use    Smoking status: Former     Current packs/day: 0.00     Average packs/day: 0.3 packs/day for 15.0 years (3.8 ttl pk-yrs)     Types: Cigarettes     Start date: 2/15/2006     Quit date: 2/15/2021     Years since quittin.9    Smokeless tobacco: Never   Vaping Use    Vaping Use: Every day    Substances: Nicotine, Flavoring    Devices: Disposable   Substance and Sexual Activity    Alcohol use: Yes     Comment: 1/2 bottle of wine every 2 months    Drug use: Not Currently     Types: Methamphetamines, Inhaled      "Comment: history of meth from 8650-6937, no IV, one use a couple months ago     Sexual activity: Yes     Partners: Male     Birth control/protection: Condom       CURRENT MEDICATIONS  Home Medications       Reviewed by Josias Tinoco R.N. (Registered Nurse) on 02/02/24 at 2230  Med List Status: Partial     Medication Last Dose Status   albuterol (PROVENTIL HFA) 108 (90 Base) MCG/ACT Aero Soln inhalation aerosol  Active   atorvastatin (LIPITOR) 10 MG Tab  Active   Blood Glucose Monitoring Suppl (BLOOD GLUCOSE MONITOR SYSTEM) w/Device Kit  Active   dicyclomine (BENTYL) 20 MG Tab  Active   famotidine (PEPCID) 20 MG Tab  Active   fenofibrate micronized (LOFIBRA) 67 MG capsule  Active   glucose blood strip  Active   hydrocortisone 2.5 % Cream topical cream  Active   Insulin Pen Needle 32 G x 4 mm  Active   Lancets  Active   liraglutide (VICTOZA) 18 MG/3ML Solution Pen-injector  Active   loperamide (IMODIUM) 2 MG Cap  Active   metFORMIN ER (GLUCOPHAGE XR) 500 MG TABLET SR 24 HR  Active   ondansetron (ZOFRAN ODT) 4 MG TABLET DISPERSIBLE  Active   prenatal vitamin with Fe/FA (SANDOR PRENATAL) 28-0.8 MG Tab  Active   sertraline (ZOLOFT) 100 MG Tab  Active                    ALLERGIES  Allergies   Allergen Reactions    Abilify Unspecified     Pt states that the medication affects her mobility, room starts spinning, unable to walk or use hands or feet, black outs    Norco [Apap-Fd&C Blue #1-Hydrocodone] Vomiting    Norco [Hydrocodone-Acetaminophen] Vomiting    Prazosin        PHYSICAL EXAM  VITAL SIGNS: /75   Pulse 79   Temp 36.1 °C (97 °F) (Temporal)   Resp 16   Ht 1.549 m (5' 1\")   Wt 98.9 kg (218 lb 0.6 oz)   SpO2 99%   BMI 41.20 kg/m²    Constitutional: No acute distress.  HENT: Normocephalic, Atraumatic, Bilateral external ears normal.  Right tympanic membrane is erythematous, bulging with a large serous effusion posteriorly.  Auditory canal unremarkable.  No tenderness with palpation of the auricle or " manipulation of the auricle.  No mastoid tenderness.  Left panic membrane unremarkable.  Musculoskeletal: No obvious deformity. No leg edema.  Skin: Warm, Dry, No erythema, No rash.   Neurologic: Alert and oriented x 3, cranial nerves II through XII intact  Psychiatric: Appropriate affect for situation      COURSE & MEDICAL DECISION MAKING    ED Observation Status? No; Patient does not meet criteria for ED Observation.     INITIAL ASSESSMENT, COURSE AND PLAN  Care Narrative:     Patient presents to the emergency department for evaluation of right-sided hearing change and ear pain.  Examination consistent with acute otitis media with an intact tympanic membrane.  No evidence of mastoiditis, otitis externa or cellulitis of the auricle.  No clinical evidence suggestive of intracranial infection.  Will treat with amoxicillin and provide the first dose in the emergency department as well as Tylenol and ibuprofen for pain.  Patient denies possibility of pregnancy..  Prescription written and follow-up plan discussed including seeing her PCP in 1 week for recheck.  Return precautions discussed all questions answered and she was discharged stable condition.      ADDITIONAL PROBLEM LIST  None  DISPOSITION AND DISCUSSIONS  I have discussed management of the patient with the following physicians and ISIS's: None    Discussion of management with other QHP or appropriate source(s): None     Decision tools and prescription drugs considered including, but not limited to: Antibiotics amoxicillin and Pain Medications Tylenol and ibuprofen .    FINAL IMPRESSION  1. Non-recurrent acute serous otitis media of right ear        PRESCRIPTIONS  New Prescriptions    AMOXICILLIN (AMOXIL) 500 MG CAP    Take 1 Capsule by mouth 3 times a day for 5 days.       FOLLOW UP  Nabila Mitchell M.D.  [address] [place] [address]  [phone]    Schedule an appointment as soon as possible for a visit in 1 week      [facility],  Emergency Dept  [facility] [facility] [address]  [phone]    As needed, If symptoms worsen        -DISCHARGE-      Electronically signed by: René Gonzalez M.D., 2/2/2024 11:05 PM

## 2024-02-03 NOTE — ED NOTES
Pt medicated per MAR.    Pt request tylenol/motrin RX added for insurance coverage of cost. ERP notified and will add-on. Pt aware.    Pt request work note, which was provided to pt.    Pt educated on discharge instructions. All questions & concerns addressed. Vitals re-assessed and at pt's baseline. Pt ambulates to exit with steady gait and all belongings.    No IV to d/c

## 2024-02-05 PROCEDURE — RXMED WILLOW AMBULATORY MEDICATION CHARGE: Performed by: FAMILY MEDICINE

## 2024-02-07 ENCOUNTER — PHARMACY VISIT (OUTPATIENT)
Dept: PHARMACY | Facility: MEDICAL CENTER | Age: 29
End: 2024-02-07
Payer: COMMERCIAL

## 2024-02-09 DIAGNOSIS — R11.0 NAUSEA: ICD-10-CM

## 2024-02-09 RX ORDER — ONDANSETRON 4 MG/1
4 TABLET, ORALLY DISINTEGRATING ORAL EVERY 6 HOURS PRN
Qty: 20 TABLET | Refills: 0 | Status: SHIPPED | OUTPATIENT
Start: 2024-02-09

## 2024-02-09 NOTE — TELEPHONE ENCOUNTER
Received request via: Patient    Was the patient seen in the last year in this department? Yes    Does the patient have an active prescription (recently filled or refills available) for medication(s) requested? No    Pharmacy Name: [facility]    Does the patient have care home Plus and need 100 day supply (blood pressure, diabetes and cholesterol meds only)? Patient does not have SCP    Future Appointments         Provider Department [place]    2/16/2024 8:20 AM (Arrive by 8:05 AM) Nabila Mitchell M.D. [facility]

## 2024-02-12 ENCOUNTER — APPOINTMENT (OUTPATIENT)
Dept: RADIOLOGY | Facility: MEDICAL CENTER | Age: 29
End: 2024-02-12
Attending: STUDENT IN AN ORGANIZED HEALTH CARE EDUCATION/TRAINING PROGRAM
Payer: COMMERCIAL

## 2024-02-12 ENCOUNTER — HOSPITAL ENCOUNTER (EMERGENCY)
Facility: MEDICAL CENTER | Age: 29
End: 2024-02-12
Attending: STUDENT IN AN ORGANIZED HEALTH CARE EDUCATION/TRAINING PROGRAM
Payer: COMMERCIAL

## 2024-02-12 VITALS
BODY MASS INDEX: 40.59 KG/M2 | HEART RATE: 74 BPM | WEIGHT: 215 LBS | RESPIRATION RATE: 18 BRPM | DIASTOLIC BLOOD PRESSURE: 73 MMHG | OXYGEN SATURATION: 96 % | SYSTOLIC BLOOD PRESSURE: 113 MMHG | HEIGHT: 61 IN | TEMPERATURE: 97.3 F

## 2024-02-12 DIAGNOSIS — S39.012A STRAIN OF LUMBAR REGION, INITIAL ENCOUNTER: ICD-10-CM

## 2024-02-12 PROCEDURE — 72100 X-RAY EXAM L-S SPINE 2/3 VWS: CPT

## 2024-02-12 PROCEDURE — A9270 NON-COVERED ITEM OR SERVICE: HCPCS | Mod: UD | Performed by: STUDENT IN AN ORGANIZED HEALTH CARE EDUCATION/TRAINING PROGRAM

## 2024-02-12 PROCEDURE — 700101 HCHG RX REV CODE 250: Mod: UD | Performed by: STUDENT IN AN ORGANIZED HEALTH CARE EDUCATION/TRAINING PROGRAM

## 2024-02-12 PROCEDURE — 99284 EMERGENCY DEPT VISIT MOD MDM: CPT

## 2024-02-12 PROCEDURE — 700102 HCHG RX REV CODE 250 W/ 637 OVERRIDE(OP): Mod: UD | Performed by: STUDENT IN AN ORGANIZED HEALTH CARE EDUCATION/TRAINING PROGRAM

## 2024-02-12 RX ORDER — METHOCARBAMOL 500 MG/1
500 TABLET, FILM COATED ORAL ONCE
Status: COMPLETED | OUTPATIENT
Start: 2024-02-12 | End: 2024-02-12

## 2024-02-12 RX ORDER — LIDOCAINE 4 G/G
1 PATCH TOPICAL ONCE
Status: DISCONTINUED | OUTPATIENT
Start: 2024-02-12 | End: 2024-02-12 | Stop reason: HOSPADM

## 2024-02-12 RX ORDER — MORPHINE SULFATE 15 MG/1
15 TABLET ORAL EVERY 12 HOURS PRN
Qty: 3 TABLET | Refills: 0 | Status: SHIPPED | OUTPATIENT
Start: 2024-02-12 | End: 2024-02-15

## 2024-02-12 RX ORDER — METHOCARBAMOL 500 MG/1
500 TABLET, FILM COATED ORAL 3 TIMES DAILY PRN
Qty: 30 TABLET | Refills: 0 | Status: SHIPPED | OUTPATIENT
Start: 2024-02-12 | End: 2024-02-22

## 2024-02-12 RX ORDER — LIDOCAINE 50 MG/G
1 PATCH TOPICAL EVERY 24 HOURS
Qty: 10 PATCH | Refills: 0 | Status: SHIPPED | OUTPATIENT
Start: 2024-02-12

## 2024-02-12 RX ADMIN — METHOCARBAMOL 500 MG: 500 TABLET ORAL at 19:49

## 2024-02-12 RX ADMIN — LIDOCAINE 1 PATCH: 4 PATCH TOPICAL at 18:54

## 2024-02-12 ASSESSMENT — FIBROSIS 4 INDEX: FIB4 SCORE: 0.32

## 2024-02-12 NOTE — ED TRIAGE NOTES
Chief Complaint   Patient presents with    Low Back Pain     Lower back pain x2 hours. 4/10 pain. Reports a fall while intoxicated a couple days ago. -head strike, -LOC, -thinners. Denies numbness or tingling to lower extremities.       Patient wheeled to triage for above complaint. Patient A&Ox4, GCS 15, patient speaking in full sentences. Equal and unlabored respirations. Patient educated on triage process and encouraged to notify staff if condition worsens. Appropriate protocols ordered. Patient returned to the lobby in stable condition.

## 2024-02-12 NOTE — Clinical Note
Anisha Tristan was seen and treated in our emergency department on 2/12/2024.  She may return to work on 02/16/2024.       If you have any questions or concerns, please don't hesitate to call.      Cachorro Aguirre M.D.

## 2024-02-13 NOTE — ED PROVIDER NOTES
ED Provider Note    CHIEF COMPLAINT  Chief Complaint   Patient presents with    Low Back Pain     Lower back pain x2 hours. 4/10 pain. Reports a fall while intoxicated a couple days ago. -head strike, -LOC, -thinners. Denies numbness or tingling to lower extremities.        EXTERNAL RECORDS REVIEWED  PDMP score of 0 low risk    HPI/ROS  LIMITATION TO HISTORY   Select: : None  OUTSIDE HISTORIAN(S):  Significant other    Anisha Tristan is a 28 y.o. female who presents with lumbar back pain that began earlier today.  Patient reports 2 days ago falling while intoxicated but otherwise not experienced any back pain at that time or the next day.  She denies any other associated odd movements or trauma to provoke the back pain today and reports an otherwise presented spontaneously.  She denies any radicular symptoms into either leg or buttocks.  She also denies any numbness, weakness, incontinence, fevers.    PAST MEDICAL HISTORY   has a past medical history of ASTHMA, Asthma, Carpal tunnel syndrome, Diabetes (HCC), Hyperlipidemia, Morbid obesity (HCC), PCOS (polycystic ovarian syndrome), and Vitamin D deficiency.    SURGICAL HISTORY   has a past surgical history that includes eye surgery; open reduction; carpal tunnel release (Bilateral); and orif, ankle (Right, 2010).    FAMILY HISTORY  Family History   Problem Relation Age of Onset    Cancer Mother         throat    Alcohol/Drug Mother     Cancer Maternal Uncle     Diabetes Maternal Grandmother     Hypertension Maternal Grandmother     Heart Disease Neg Hx     Stroke Neg Hx        SOCIAL HISTORY  Social History     Tobacco Use    Smoking status: Former     Current packs/day: 0.00     Average packs/day: 0.3 packs/day for 15.0 years (3.8 ttl pk-yrs)     Types: Cigarettes     Start date: 2/15/2006     Quit date: 2/15/2021     Years since quittin.9    Smokeless tobacco: Never   Vaping Use    Vaping Use: Every day    Substances: Nicotine, Flavoring    Devices:  "Disposable   Substance and Sexual Activity    Alcohol use: Yes     Comment: occ.    Drug use: Not Currently     Types: Methamphetamines, Inhaled     Comment: history of meth from 2361-2559, no IV, one use a couple months ago     Sexual activity: Yes     Partners: Male     Birth control/protection: Condom       CURRENT MEDICATIONS  Home Medications       Reviewed by Yvonne Elise R.N. (Registered Nurse) on 02/12/24 at 1551  Med List Status: Partial     Medication Last Dose Status   acetaminophen (TYLENOL) 500 MG Tab  Active   albuterol (PROVENTIL HFA) 108 (90 Base) MCG/ACT Aero Soln inhalation aerosol  Active   atorvastatin (LIPITOR) 10 MG Tab  Active   Blood Glucose Monitoring Suppl (BLOOD GLUCOSE MONITOR SYSTEM) w/Device Kit  Active   dicyclomine (BENTYL) 20 MG Tab  Active   famotidine (PEPCID) 20 MG Tab  Active   fenofibrate micronized (LOFIBRA) 67 MG capsule  Active   glucose blood strip  Active   hydrocortisone 2.5 % Cream topical cream  Active   ibuprofen (MOTRIN) 600 MG Tab  Active   Insulin Pen Needle 32 G x 4 mm  Active   Lancets  Active   liraglutide (VICTOZA) 18 MG/3ML Solution Pen-injector  Active   loperamide (IMODIUM) 2 MG Cap  Active   metFORMIN ER (GLUCOPHAGE XR) 500 MG TABLET SR 24 HR  Active   ondansetron (ZOFRAN ODT) 4 MG TABLET DISPERSIBLE  Active   prenatal vitamin with Fe/FA (SANDOR PRENATAL) 28-0.8 MG Tab  Active   sertraline (ZOLOFT) 100 MG Tab  Active                    ALLERGIES  Allergies   Allergen Reactions    Abilify Unspecified     Pt states that the medication affects her mobility, room starts spinning, unable to walk or use hands or feet, black outs    Norco [Apap-Fd&C Blue #1-Hydrocodone] Vomiting    Norco [Hydrocodone-Acetaminophen] Vomiting    Prazosin        PHYSICAL EXAM  VITAL SIGNS: /73   Pulse 74   Temp 36.3 °C (97.3 °F) (Temporal)   Resp 18   Ht 1.549 m (5' 1\")   Wt 97.5 kg (215 lb)   SpO2 96%   BMI 40.62 kg/m²    Physical Exam  Vitals and nursing note " reviewed.   Constitutional:       Appearance: She is well-developed.   HENT:      Head: Normocephalic.   Eyes:      Extraocular Movements: Extraocular movements intact.      Pupils: Pupils are equal, round, and reactive to light.   Cardiovascular:      Rate and Rhythm: Normal rate and regular rhythm.   Pulmonary:      Effort: Pulmonary effort is normal.      Breath sounds: Normal breath sounds.   Abdominal:      Palpations: Abdomen is soft.      Tenderness: There is no abdominal tenderness.   Musculoskeletal:      Cervical back: Normal range of motion.   Neurological:      Mental Status: She is alert and oriented to person, place, and time.         DIAGNOSTIC STUDIES / PROCEDURES      RADIOLOGY  I have independently interpreted the diagnostic imaging associated with this visit and am waiting the final reading from the radiologist.   My preliminary interpretation is as follows: X-ray of lumbar spine no acute process  Radiologist interpretation:   DX-LUMBAR SPINE-2 OR 3 VIEWS   Final Result      No acute abnormality detected. Mild L4-5 disc space narrowing.          COURSE & MEDICAL DECISION MAKING    ED Observation Status? No; Patient does not meet criteria for ED Observation.     INITIAL ASSESSMENT, COURSE AND PLAN  Care Narrative: 28-year-old female presents to the ED for 1 day of lumbar back pain no concerning features on history or physical exam patient otherwise well-appearing with no red flag signs.  Suspect lumbar strain.  Advised pain control regimen with multimodal therapy to the patient and instruct her to to return to the ED should she develop weakness, numbness, incontinence        ADDITIONAL PROBLEM LIST  Past Medical History:   Diagnosis Date    ASTHMA     Asthma     Carpal tunnel syndrome     Diabetes (HCC)     Hyperlipidemia     Morbid obesity (HCC)     PCOS (polycystic ovarian syndrome)     Vitamin D deficiency      DISPOSITION AND DISCUSSIONS      Discussion of management with other QHP or  appropriate source(s): None         Barriers to care at this time, including but not limited to: Patient lacks financial resources.     Decision tools and prescription drugs considered including, but not limited to: Pain Medications lidocaine patches, muscle relaxants, NSAIDs, Tylenol and opiate therapy for failure of aforementioned therapies .    FINAL DIAGNOSIS  1. Strain of lumbar region, initial encounter Acute          Electronically signed by: Cachorro Aguirre M.D., 2/13/2024 12:08 AM

## 2024-02-13 NOTE — ED NOTES
Pt medicated per MAR. Pt provided discharge instructions and follow-up information. Pt verbalized understanding and all questions answered. Pt ambulated from ED with steady gait carrying all belongings.

## 2024-02-16 ENCOUNTER — OFFICE VISIT (OUTPATIENT)
Dept: MEDICAL GROUP | Facility: MEDICAL CENTER | Age: 29
End: 2024-02-16
Attending: FAMILY MEDICINE
Payer: COMMERCIAL

## 2024-02-16 VITALS
SYSTOLIC BLOOD PRESSURE: 100 MMHG | TEMPERATURE: 97.6 F | HEART RATE: 94 BPM | DIASTOLIC BLOOD PRESSURE: 76 MMHG | OXYGEN SATURATION: 96 % | RESPIRATION RATE: 14 BRPM | BODY MASS INDEX: 38.71 KG/M2 | HEIGHT: 61 IN | WEIGHT: 205 LBS

## 2024-02-16 DIAGNOSIS — R11.0 CHRONIC NAUSEA: ICD-10-CM

## 2024-02-16 DIAGNOSIS — F33.1 MODERATE EPISODE OF RECURRENT MAJOR DEPRESSIVE DISORDER (HCC): ICD-10-CM

## 2024-02-16 DIAGNOSIS — E11.9 TYPE 2 DIABETES MELLITUS WITHOUT COMPLICATION, WITHOUT LONG-TERM CURRENT USE OF INSULIN (HCC): ICD-10-CM

## 2024-02-16 DIAGNOSIS — M54.50 CHRONIC MIDLINE LOW BACK PAIN WITHOUT SCIATICA: ICD-10-CM

## 2024-02-16 DIAGNOSIS — G89.29 CHRONIC MIDLINE LOW BACK PAIN WITHOUT SCIATICA: ICD-10-CM

## 2024-02-16 LAB
HBA1C MFR BLD: 7.3 % (ref ?–5.8)
POCT INT CON NEG: NEGATIVE
POCT INT CON POS: POSITIVE

## 2024-02-16 PROCEDURE — 3074F SYST BP LT 130 MM HG: CPT | Performed by: FAMILY MEDICINE

## 2024-02-16 PROCEDURE — 96372 THER/PROPH/DIAG INJ SC/IM: CPT | Performed by: FAMILY MEDICINE

## 2024-02-16 PROCEDURE — 700111 HCHG RX REV CODE 636 W/ 250 OVERRIDE (IP): Mod: JZ,UD | Performed by: FAMILY MEDICINE

## 2024-02-16 PROCEDURE — 99213 OFFICE O/P EST LOW 20 MIN: CPT | Performed by: FAMILY MEDICINE

## 2024-02-16 PROCEDURE — 99214 OFFICE O/P EST MOD 30 MIN: CPT | Performed by: FAMILY MEDICINE

## 2024-02-16 PROCEDURE — 83036 HEMOGLOBIN GLYCOSYLATED A1C: CPT | Performed by: FAMILY MEDICINE

## 2024-02-16 PROCEDURE — 3078F DIAST BP <80 MM HG: CPT | Performed by: FAMILY MEDICINE

## 2024-02-16 RX ORDER — LANCETS 30 GAUGE
EACH MISCELLANEOUS
Qty: 100 EACH | Refills: 5 | Status: SHIPPED | OUTPATIENT
Start: 2024-02-16

## 2024-02-16 RX ORDER — CITALOPRAM 20 MG/1
30 TABLET ORAL DAILY
Qty: 45 TABLET | Refills: 5 | Status: SHIPPED | OUTPATIENT
Start: 2024-02-16

## 2024-02-16 RX ORDER — KETOROLAC TROMETHAMINE 30 MG/ML
30 INJECTION, SOLUTION INTRAMUSCULAR; INTRAVENOUS ONCE
Status: COMPLETED | OUTPATIENT
Start: 2024-02-16 | End: 2024-02-16

## 2024-02-16 RX ADMIN — KETOROLAC TROMETHAMINE 30 MG: 30 INJECTION, SOLUTION INTRAMUSCULAR; INTRAVENOUS at 09:23

## 2024-02-16 ASSESSMENT — PATIENT HEALTH QUESTIONNAIRE - PHQ9: CLINICAL INTERPRETATION OF PHQ2 SCORE: 0

## 2024-02-16 ASSESSMENT — FIBROSIS 4 INDEX: FIB4 SCORE: 0.32

## 2024-02-16 NOTE — ASSESSMENT & PLAN NOTE
Pt was out of Trulicity 12/23-1/24  Was started on victoza about 1 week ago, just increased 1.2mg/day   No abnormal symptoms

## 2024-02-16 NOTE — ASSESSMENT & PLAN NOTE
Needs zofran once a day at least  Chronic nausea  Was out of trulicity for about 1 month without any worsening of nausea  Getting worse over the last few months, ongoing for years, but used to only need zofran once per week  Ibuprofen - not even once per week  BG have been elevated  With dizziness   Sertraline - has been on 50mg for a long time, dose increase over the last few months

## 2024-02-16 NOTE — PROGRESS NOTES
"Subjective:     CC:   Chief Complaint   Patient presents with    Diabetes         HPI:     Type 2 diabetes mellitus without complication, without long-term current use of insulin (HCC)  Pt was out of Trulicity 12/23-1/24  Was started on victoza about 1 week ago, just increased 1.2mg/day   No abnormal symptoms     Chronic nausea  Needs zofran once a day at least  Chronic nausea  Was out of trulicity for about 1 month without any worsening of nausea  Getting worse over the last few months, ongoing for years, but used to only need zofran once per week  Ibuprofen - not even once per week  BG have been elevated  With dizziness   Sertraline - has been on 50mg for a long time, dose increase over the last few months     Chronic midline low back pain without sciatica  Pt reports long standing back pain however 4-5 days ago she \"pinched it\" with immediate pain and went to ER  Xrays show mild disc narrowing L4-L5  No saddle anesthesia. No numbness/tingling in the feet.  No falls.   Has never had it treated before   Right now she is taking methocarbamol but causes drwosiness  She has not been taking ibuprofen/tylenol       Past Medical History:   Diagnosis Date    ASTHMA     Asthma     Carpal tunnel syndrome     Diabetes (HCC)     Hyperlipidemia     Morbid obesity (HCC)     PCOS (polycystic ovarian syndrome)     Vitamin D deficiency        Social History     Tobacco Use    Smoking status: Former     Current packs/day: 0.00     Average packs/day: 0.3 packs/day for 15.0 years (3.8 ttl pk-yrs)     Types: Cigarettes     Start date: 2/15/2006     Quit date: 2/15/2021     Years since quitting: 3.0    Smokeless tobacco: Never   Vaping Use    Vaping Use: Every day    Substances: Nicotine, Flavoring    Devices: Disposable   Substance Use Topics    Alcohol use: Yes     Comment: occ.    Drug use: Not Currently     Types: Methamphetamines, Inhaled     Comment: history of meth from 0720-8039, no IV, one use a couple months ago  "       Current Outpatient Medications Ordered in Epic   Medication Sig Dispense Refill    Lancets Use one to check glucoses daily morning before breakfast 100 Each 5    citalopram (CELEXA) 20 MG Tab Take 1.5 Tablets by mouth every day. 45 Tablet 5    lidocaine (LIDODERM) 5 % Patch Place 1 Patch on the skin every 24 hours. 10 Patch 0    methocarbamol (ROBAXIN) 500 MG Tab Take 1 Tablet by mouth 3 times a day as needed (back pain) for up to 10 days. 30 Tablet 0    ondansetron (ZOFRAN ODT) 4 MG TABLET DISPERSIBLE Take 1 Tablet by mouth every 6 hours as needed for Nausea/Vomiting. 20 Tablet 0    acetaminophen (TYLENOL) 500 MG Tab Take 1-2 Tablets by mouth every 6 hours as needed for Mild Pain or Moderate Pain. 30 Tablet 0    ibuprofen (MOTRIN) 600 MG Tab Take 1 Tablet by mouth every 6 hours as needed for Mild Pain or Moderate Pain. 30 Tablet 0    liraglutide (VICTOZA) 18 MG/3ML Solution Pen-injector Inject 0.6 mg under the skin every day. After 1 week increase to 1.2mg daily 6 mL 5    Insulin Pen Needle 32 G x 4 mm Use 1 Each every day. 100 Each 2    Blood Glucose Monitoring Suppl (BLOOD GLUCOSE MONITOR SYSTEM) w/Device Kit Test blood sugar as recommended by provider. 1 Kit 0    glucose blood strip Test glucose daily in the morning fasting 100 Strip 5    atorvastatin (LIPITOR) 10 MG Tab Take 1 Tablet by mouth every evening. 30 Tablet 5    fenofibrate micronized (LOFIBRA) 67 MG capsule Take 1 Capsule by mouth every morning before breakfast. 30 Capsule 5    albuterol (PROVENTIL HFA) 108 (90 Base) MCG/ACT Aero Soln inhalation aerosol Inhale 2 Puffs every 6 hours as needed for Shortness of Breath. 8.5 g 5    prenatal vitamin with Fe/FA (SANDOR PRENATAL) 28-0.8 MG Tab Take 1 Tablet by mouth every day. 30 Tablet 5    famotidine (PEPCID) 20 MG Tab Take 1 Tablet by mouth 2 times a day. 60 Tablet 5    dicyclomine (BENTYL) 20 MG Tab Take 1 Tablet by mouth 3 times a day before meals. 30 Tablet 5    loperamide (IMODIUM) 2 MG Cap Take  "1 Capsule by mouth 1 time a day as needed for Diarrhea. 30 Capsule 2    metFORMIN ER (GLUCOPHAGE XR) 500 MG TABLET SR 24 HR Take 1 Tablet by mouth every day. 30 Tablet 5    hydrocortisone 2.5 % Cream topical cream Apply thin layer to areas of eczema daily as needed 30 g 3     No current Epic-ordered facility-administered medications on file.       Allergies:  Abilify, Norco [apap-fd&c blue #1-hydrocodone], Norco [hydrocodone-acetaminophen], and Prazosin        Objective:       Exam:  /76 (BP Location: Left arm, Patient Position: Sitting)   Pulse 94   Temp 36.4 °C (97.6 °F) (Temporal)   Resp 14   Ht 1.549 m (5' 1\")   Wt 93 kg (205 lb)   SpO2 96%   BMI 38.73 kg/m²  Body mass index is 38.73 kg/m².    General: Normal appearing. No distress.  Musculoskeletal:   Back:   ROM: ROM of the lumbar spine is functional. Flexion: decreased, due to pain. Extension: decreased, due to pain. Side bending: normal bilaterally.  Axial rotation: decreased, due to pain    Palpation:  Paraspinals:  tender lowe rlumbar  Quadratus lumborum: nontender  Obliques: nontender  Spinous processes: tender lowe rlumbar  Gluteals: nontender  SI Joint: nontender      Special tests:  SLR: negative  Seated slump test : negative  LUCY: pos b/l  FADIR: neg  Facet loading with extension-rotation: negative    Psych: Normal mood and affect.       Data reviewed:   Reviewed xray from ER, reviewed ER visit  A1c 7.3% today    Assessment & Plan:     28 y.o. female with the following -     1. Type 2 diabetes mellitus without complication, without long-term current use of insulin ([facility])  - Lancets; Use one to check glucoses daily morning before breakfast  Dispense: 100 Each; Refill: 5  - POCT Hemoglobin A1C  Patient has only been on Victoza for about 1 week.  Just increased her medication up to 1.2 mg/week.  I advised her to start checking her blood sugars, 1-2 times per day.  Will do a virtual visit in 1 month to review them and see if she needs to " increase the dose further.  Her A1c is above goal, however this does include 1 to 2 months without her Trulicity.    2. Chronic nausea  3. Moderate episode of recurrent major depressive disorder (HCC)  - citalopram (CELEXA) 20 MG Tab; Take 1.5 Tablets by mouth every day.  Dispense: 45 Tablet; Refill: 5  Patient has had chronic nausea for many years, has noted worsening over the last 3 months.  She has been on sertraline for many years.  She did have a dose increase coinciding with worsening of nausea.  She did experience benefit from higher dose of sertraline, so we will change her medication over to citalopram instead of sertraline.  If she continues to have GI upset, we can try different class of antidepressant.    4. Chronic midline low back pain without sciatica  - ketorolac (Toradol) injection 30 mg  - Referral to Physical Therapy  Likely a muscle strain.  Toradol today.  Advised NSAID/acetaminophen/muscle relaxer as needed.  She was given exercises to take home today.  Start physical therapy.    Return in about 1 month (around 3/16/2024) for f/u bg.    Please note that this dictation was created using voice recognition software. I have made every reasonable attempt to correct obvious errors, but I expect that there are errors of grammar and possibly content that I did not discover before finalizing the note.

## 2024-02-16 NOTE — ASSESSMENT & PLAN NOTE
"Pt reports long standing back pain however 4-5 days ago she \"pinched it\" with immediate pain and went to ER  Xrays show mild disc narrowing L4-L5  No saddle anesthesia. No numbness/tingling in the feet.  No falls.   Has never had it treated before   Right now she is taking methocarbamol but causes drwosiness  She has not been taking ibuprofen/tylenol     "

## 2024-03-04 ENCOUNTER — APPOINTMENT (OUTPATIENT)
Dept: RADIOLOGY | Facility: MEDICAL CENTER | Age: 29
End: 2024-03-04
Attending: EMERGENCY MEDICINE
Payer: COMMERCIAL

## 2024-03-04 ENCOUNTER — HOSPITAL ENCOUNTER (EMERGENCY)
Facility: MEDICAL CENTER | Age: 29
End: 2024-03-04
Attending: EMERGENCY MEDICINE
Payer: COMMERCIAL

## 2024-03-04 VITALS
HEART RATE: 89 BPM | SYSTOLIC BLOOD PRESSURE: 118 MMHG | TEMPERATURE: 98.1 F | RESPIRATION RATE: 18 BRPM | WEIGHT: 213.41 LBS | DIASTOLIC BLOOD PRESSURE: 74 MMHG | OXYGEN SATURATION: 98 % | HEIGHT: 61 IN | BODY MASS INDEX: 40.29 KG/M2

## 2024-03-04 DIAGNOSIS — R05.9 COUGH, UNSPECIFIED TYPE: ICD-10-CM

## 2024-03-04 DIAGNOSIS — R09.81 NASAL CONGESTION: ICD-10-CM

## 2024-03-04 LAB
FLUAV RNA SPEC QL NAA+PROBE: NEGATIVE
FLUBV RNA SPEC QL NAA+PROBE: NEGATIVE
RSV RNA SPEC QL NAA+PROBE: NEGATIVE
SARS-COV-2 RNA RESP QL NAA+PROBE: NOTDETECTED

## 2024-03-04 PROCEDURE — 71046 X-RAY EXAM CHEST 2 VIEWS: CPT

## 2024-03-04 PROCEDURE — 0241U HCHG SARS-COV-2 COVID-19 NFCT DS RESP RNA 4 TRGT ED POC: CPT

## 2024-03-04 PROCEDURE — 99283 EMERGENCY DEPT VISIT LOW MDM: CPT | Mod: 25

## 2024-03-04 RX ORDER — CODEINE PHOSPHATE AND GUAIFENESIN 10; 100 MG/5ML; MG/5ML
10 SOLUTION ORAL EVERY 4 HOURS PRN
Qty: 300 ML | Refills: 0 | Status: SHIPPED | OUTPATIENT
Start: 2024-03-04 | End: 2024-03-09

## 2024-03-04 ASSESSMENT — FIBROSIS 4 INDEX: FIB4 SCORE: 0.32

## 2024-03-04 NOTE — ED NOTES
Pt ready for discharge.  Instructions provided and prescription called in.  Pt verbalized understanding.  Leaves ER ambulatory, steady gait, no distress.

## 2024-03-04 NOTE — ED TRIAGE NOTES
Ambulatory to triage w/ c/o cough and congestion x 1 wk. No distress noted.  Masked prior to check in.    hide

## 2024-03-04 NOTE — ED PROVIDER NOTES
ED Provider Note  CHIEF COMPLAINT  Chief Complaint   Patient presents with    Cough    Congestion       HPI  Anisha Tristan is a 28 y.o. female who presents for evaluation of a cough which has been present for at least a couple of days however the patient notes she has been sick for a month and a half with varying symptoms.  The symptoms started a month and a half ago, they were generally GI in nature with nausea and vomiting however now she states she has chest congestion and a cough.  She notes no measured fevers but does feel fatigued.  She notes she does not smoke but she does vape.  She takes metformin and a new antidepressant, the name of which she does not know.  EXTERNAL RECORDS REVIEWED  Reviewed office visit on 16 February for type 2 diabetes.  ROS  Constitutional: No fevers or chills  Skin: No rashes  HEENT: No sore throat, runny nose  Neck: No neck pain  Chest: No pain or rashes  Pulm: No shortness of breath, wheezing, stridor, or pain with inspiration/expiration  Gastrointestinal: No nausea, vomiting, diarrhea, or abdominal pain.  Musculoskeletal: No pain, swelling, or weakness  Neurologic: No sensory or focal motor changes to extremities. No confusion or disorientation.  Heme: No bleeding or bruising problems.   Immuno: No hx of recurrent infections        LIMITATION TO HISTORY   None  OUTSIDE HISTORIAN(S):  None        PAST FAM HISTORY  Family History   Problem Relation Age of Onset    Cancer Mother         throat    Alcohol/Drug Mother     Cancer Maternal Uncle     Diabetes Maternal Grandmother     Hypertension Maternal Grandmother     Heart Disease Neg Hx     Stroke Neg Hx        PAST MEDICAL HISTORY   has a past medical history of ASTHMA, Asthma, Carpal tunnel syndrome, Diabetes (HCC), Hyperlipidemia, Morbid obesity (HCC), PCOS (polycystic ovarian syndrome), and Vitamin D deficiency.    SOCIAL HISTORY  Social History     Tobacco Use    Smoking status: Former     Current packs/day: 0.00      "Average packs/day: 0.3 packs/day for 15.0 years (3.8 ttl pk-yrs)     Types: Cigarettes     Start date: 2/15/2006     Quit date: 2/15/2021     Years since quitting: 3.0    Smokeless tobacco: Never   Vaping Use    Vaping Use: Every day    Substances: Nicotine, Flavoring    Devices: Disposable   Substance and Sexual Activity    Alcohol use: Yes     Comment: occ.    Drug use: Not Currently     Types: Methamphetamines, Inhaled     Comment: history of meth from 5168-2018, no IV, one use a couple months ago     Sexual activity: Yes     Partners: Male     Birth control/protection: Condom       SURGICAL HISTORY   has a past surgical history that includes eye surgery; open reduction; carpal tunnel release (Bilateral); and orif, ankle (Right, 2010).    CURRENT MEDICATIONS  Home Medications    **Home medications have not yet been reviewed for this encounter**          ALLERGIES  Allergies   Allergen Reactions    Abilify Unspecified     Pt states that the medication affects her mobility, room starts spinning, unable to walk or use hands or feet, black outs    Norco [Apap-Fd&C Blue #1-Hydrocodone] Vomiting    Norco [Hydrocodone-Acetaminophen] Vomiting    Prazosin        PHYSICAL EXAM  VITAL SIGNS: /74   Pulse 89   Temp 36.7 °C (98.1 °F)   Resp 18   Ht 1.549 m (5' 1\")   Wt 96.8 kg (213 lb 6.5 oz)   LMP 02/26/2024   SpO2 98%   BMI 40.32 kg/m²    Gen: Alert in no apparent distress.  HEENT: No signs of trauma, Bilateral external ears normal, Nose normal. Conjunctiva normal, Non-icteric.   Cardiovascular: Regular rate and rhythm, no murmurs.  Capillary refill less than 3 seconds to all extremities, 2+ distal pulses.  Thorax & Lungs: Normal breath sounds, No respiratory distress, No wheezing bilateral chest rise  Abdomen: No distention  Skin: Warm, Dry, No erythema, No rash noted to exposed areas.   Extremities: Intact distal pulses, No edema  Neurologic: Alert , no facial droop, grossly normal coordination and " strength  Psychiatric: Affect pleasant    INITIAL IMPRESSION  Patient arrives for evaluation of a cough which may be related to his seasonal infectious issue or could be from vaping.  Alternatively, allergies seem less likely given the time of year however this is still a possibility.  Patient does not appear septic or toxic and I do not feel serum evaluation is necessary but I do feel it reasonable to test for seasonal viruses.  I will also get a single view chest x-ray to ensure that she does not have any lobar opacities to suggest pneumonia.  Patient states understanding of this.      ED observation? No    LABS  Results for orders placed or performed during the hospital encounter of 03/04/24   POC CoV-2, FLU A/B, RSV by PCR   Result Value Ref Range    POC Influenza A RNA, PCR Negative Negative    POC Influenza B RNA, PCR Negative Negative    POC RSV, by PCR Negative Negative    POC SARS-CoV-2, PCR NotDetected        RADIOLOGY  DX-CHEST-2 VIEWS   Final Result         1. No active cardiopulmonary abnormalities are identified.                COURSE & MEDICAL DECISION MAKING  Pertinent Labs & Imaging studies reviewed. (See chart for details)  Patient's imaging was reassuring and she did not experience any deterioration in her condition.  She states understanding she needs to avoid vaping as it could exacerbate the underlying problem but that it does not appear to be emergent at this time and I felt she was safe for symptomatic treatment at home.  Likely she is suffering from a mild seasonal virus which should self resolve given time and conservative treatment.    I have discussed management of the patient with the following physicians and ISIS's: None    Escalation of care considered, and ultimately not performed:blood analysis    Barriers to care at this time, including but not limited to: None .     Decision tools and Rx drugs considered including, but not limited to : antibiotics considered but felt to be  unnecessary    Discussion of management with other QHP or appropriate source(s): None    The patient will not drink alcohol nor drive with prescribed medications. The patient will return for worsening symptoms and is stable at the time of discharge. The patient verbalizes understanding and will comply.    FINAL IMPRESSION  1. Cough, unspecified type    2. Nasal congestion        Electronically signed by: Nirav Anderson M.D., 3/4/2024 10:26 AM

## 2024-03-05 PROCEDURE — RXMED WILLOW AMBULATORY MEDICATION CHARGE: Performed by: FAMILY MEDICINE

## 2024-03-13 ENCOUNTER — PHARMACY VISIT (OUTPATIENT)
Dept: PHARMACY | Facility: MEDICAL CENTER | Age: 29
End: 2024-03-13
Payer: COMMERCIAL

## 2024-03-29 ENCOUNTER — TELEMEDICINE (OUTPATIENT)
Dept: MEDICAL GROUP | Facility: MEDICAL CENTER | Age: 29
End: 2024-03-29
Attending: FAMILY MEDICINE
Payer: COMMERCIAL

## 2024-03-29 DIAGNOSIS — E78.1 HYPERTRIGLYCERIDEMIA: ICD-10-CM

## 2024-03-29 DIAGNOSIS — F33.1 MODERATE EPISODE OF RECURRENT MAJOR DEPRESSIVE DISORDER (HCC): ICD-10-CM

## 2024-03-29 DIAGNOSIS — K08.89 PAIN, DENTAL: ICD-10-CM

## 2024-03-29 DIAGNOSIS — E11.9 TYPE 2 DIABETES MELLITUS WITHOUT COMPLICATION, WITHOUT LONG-TERM CURRENT USE OF INSULIN (HCC): ICD-10-CM

## 2024-03-29 NOTE — ASSESSMENT & PLAN NOTE
Patient recently had 4 teeth pulled and is needing something for dental pain  She reports taking ibuprofen 600mg, tylenol 1000mg 4-6x per day  Needing something for pain as the above is not helping    Other controlled substances/drugs: codeine, etoh last night, tramadol 1 week ago  Last UDS: tomorrow  Last treatment agreement: signed today    Analgesia: n/a  Activity Level: n/a  Adverse Effects: n/a  Aberrant Behavior: n/a  Affect and Mood: n/a    History of pain: acute, dental procedure 1 week ago  PHQ9: n/a  Prior imaging: n/a  Prior treatments: ibuptofen/tylenol  Response to prior treatments: poor    Any CS last dose: none  Last dose of currently prescribed meds: none  Frequency:   Current MME:       Opioid Risk Score: No value filed.    Most recent UDS reviewed today and is consistent with prescribed medications.    I have reviewed the medical records, the Prescription Monitoring Program and I have determined that controlled substance treatment is medically indicated.

## 2024-03-29 NOTE — ASSESSMENT & PLAN NOTE
Has been taking fenofibrate without any issues  Patient recently had teeth pulled and she states all she can eat is top ramen and mashed potatoes.   Prior to teeth being pulled she was doing better with higher vegetable intake.

## 2024-03-29 NOTE — ASSESSMENT & PLAN NOTE
Patient is on victoza 1.2mg/day  Unfortunately pt misplaced her glucometer and hasn't checked her BG since our last appointment   She had BG checked for a dental procedure (fasting) and it was 112.   She has been taking victoza daily and metformin 500mg daily

## 2024-03-29 NOTE — PROGRESS NOTES
Telemedicine: New Patient   This evaluation was conducted via Zoom using secure and encrypted videoconferencing technology. The patient was in their home in the [facility].    The patient's identity was confirmed and verbal consent was obtained for this virtual visit.    Subjective:     CC:   Chief Complaint   Patient presents with    Follow-Up       Anisha Tristan is a 29 y.o. female presenting to establish care and to discuss the evaluation and management of:    Type 2 diabetes mellitus without complication, without long-term current use of insulin (HCC)  Patient is on victoza 1.2mg/day  Unfortunately pt misplaced her glucometer and hasn't checked her BG since our last appointment   She had BG checked for a dental procedure (fasting) and it was 112.   She has been taking victoza daily and metformin 500mg daily         Moderate episode of recurrent major depressive disorder (HCC)  Switched from sertraline > citalopram for about 1 month. Depression is stable. Nausea is improved    Hypertriglyceridemia  Has been taking fenofibrate without any issues  Patient recently had teeth pulled and she states all she can eat is top ramen and mashed potatoes.   Prior to teeth being pulled she was doing better with higher vegetable intake.      Pain, dental  Patient recently had 4 teeth pulled and is needing something for dental pain  She reports taking ibuprofen 600mg, tylenol 1000mg 4-6x per day  Needing something for pain as the above is not helping    Other controlled substances/drugs: codeine, etoh last night, tramadol 1 week ago  Last UDS: tomorrow  Last treatment agreement: signed today    Analgesia: n/a  Activity Level: n/a  Adverse Effects: n/a  Aberrant Behavior: n/a  Affect and Mood: n/a    History of pain: acute, dental procedure 1 week ago  PHQ9: n/a  Prior imaging: n/a  Prior treatments: ibuptofen/tylenol  Response to prior treatments: poor    Any CS last dose: none  Last dose of currently prescribed meds:  none  Frequency:   Current MME:       Opioid Risk Score: No value filed.    Most recent UDS reviewed today and is consistent with prescribed medications.    I have reviewed the medical records, the Prescription Monitoring Program and I have determined that controlled substance treatment is medically indicated.                   Allergies   Allergen Reactions    Abilify Unspecified     Pt states that the medication affects her mobility, room starts spinning, unable to walk or use hands or feet, black outs    Norco [Apap-Fd&C Blue #1-Hydrocodone] Vomiting    Norco [Hydrocodone-Acetaminophen] Vomiting    Prazosin        Current medicines (including changes today)  Current Outpatient Medications   Medication Sig Dispense Refill    Lancets Use one to check glucoses daily morning before breakfast 100 Each 5    citalopram (CELEXA) 20 MG Tab Take 1.5 Tablets by mouth every day. 45 Tablet 5    lidocaine (LIDODERM) 5 % Patch Place 1 Patch on the skin every 24 hours. 10 Patch 0    ondansetron (ZOFRAN ODT) 4 MG TABLET DISPERSIBLE Take 1 Tablet by mouth every 6 hours as needed for Nausea/Vomiting. 20 Tablet 0    acetaminophen (TYLENOL) 500 MG Tab Take 1-2 Tablets by mouth every 6 hours as needed for Mild Pain or Moderate Pain. 30 Tablet 0    ibuprofen (MOTRIN) 600 MG Tab Take 1 Tablet by mouth every 6 hours as needed for Mild Pain or Moderate Pain. 30 Tablet 0    liraglutide (VICTOZA) 18 MG/3ML Solution Pen-injector Inject 0.6 mg under the skin every day. After 1 week increase to 1.2mg daily 6 mL 5    Insulin Pen Needle 32 G x 4 mm Use 1 Each every day. 100 Each 2    Blood Glucose Monitoring Suppl (BLOOD GLUCOSE MONITOR SYSTEM) w/Device Kit Test blood sugar as recommended by provider. 1 Kit 0    glucose blood strip Test glucose daily in the morning fasting 100 Strip 5    atorvastatin (LIPITOR) 10 MG Tab Take 1 Tablet by mouth every evening. 30 Tablet 5    fenofibrate micronized (LOFIBRA) 67 MG capsule Take 1 Capsule by mouth  every morning before breakfast. 30 Capsule 5    albuterol (PROVENTIL HFA) 108 (90 Base) MCG/ACT Aero Soln inhalation aerosol Inhale 2 Puffs every 6 hours as needed for Shortness of Breath. 8.5 g 5    prenatal vitamin with Fe/FA (SANDOR PRENATAL) 28-0.8 MG Tab Take 1 Tablet by mouth every day. 30 Tablet 5    famotidine (PEPCID) 20 MG Tab Take 1 Tablet by mouth 2 times a day. 60 Tablet 5    dicyclomine (BENTYL) 20 MG Tab Take 1 Tablet by mouth 3 times a day before meals. 30 Tablet 5    loperamide (IMODIUM) 2 MG Cap Take 1 Capsule by mouth 1 time a day as needed for Diarrhea. 30 Capsule 2    metFORMIN ER (GLUCOPHAGE XR) 500 MG TABLET SR 24 HR Take 1 Tablet by mouth every day. 30 Tablet 5    hydrocortisone 2.5 % Cream topical cream Apply thin layer to areas of eczema daily as needed 30 g 3     No current facility-administered medications for this visit.       She  has a past medical history of ASTHMA, Asthma, Carpal tunnel syndrome, Diabetes (HCC), Hyperlipidemia, Morbid obesity (HCC), PCOS (polycystic ovarian syndrome), and Vitamin D deficiency.  She  has a past surgical history that includes eye surgery; open reduction; carpal tunnel release (Bilateral); and orif, ankle (Right, ).      Family History   Problem Relation Age of Onset    Cancer Mother         throat    Alcohol/Drug Mother     Cancer Maternal Uncle     Diabetes Maternal Grandmother     Hypertension Maternal Grandmother     Heart Disease Neg Hx     Stroke Neg Hx      Family Status   Relation Name Status    Mo      Fa  Alive    MUnc  (Not Specified)    MGMo  (Not Specified)    Neg Hx  (Not Specified)       Patient Active Problem List    Diagnosis Date Noted    Pain, dental 2024    Chronic nausea 2024    Chronic midline low back pain without sciatica 2024    Chronic fatigue 2023    Encounter for initial prescription of contraceptive pills 03/10/2021    Infertility associated with anovulation 2019    Dyspareunia in  female 09/23/2019    Chronic diarrhea 06/28/2019    Gallstones 06/28/2019    Nevus 06/28/2019    Type 2 diabetes mellitus without complication, without long-term current use of insulin ([facility]) 05/28/2019    Anxiety 05/28/2019    PCOS (polycystic ovarian syndrome) 05/28/2019    Mild intermittent asthma without complication 05/28/2019    GERD (gastroesophageal reflux disease) 05/28/2019    Moderate episode of recurrent major depressive disorder ([facility]) 05/28/2019    Bilateral carpal tunnel syndrome 05/28/2019    Hypertriglyceridemia 05/28/2019    Vitamin D deficiency 05/28/2019    Class 3 severe obesity due to excess calories with serious comorbidity and body mass index (BMI) of 40.0 to 44.9 in adult ([facility]) 05/28/2019    Eczema 05/28/2019          Objective:   [facility] 02/26/2024     Physical Exam  Constitutional: Alert, no distress, well-groomed.  Respiratory: Unlabored respiratory effort, no cough.  MSK: moves all extremities.  Psych: normal affect and mood.        Data:    None new    Assessment and Plan:   The following treatment plan was discussed:     1. Type 2 diabetes mellitus without complication, without long-term current use of insulin ([facility])  - HEMOGLOBIN A1C; Future  Chronic, uncontrolled.  Patient's A1c was previously elevated.  She is currently been on Victoza 1.2 mg/day.  She has not been checking her blood sugars as she has misplaced it.  Fasting glucose recently done at her dental procedure was in the normal range.  Patient will try to find her glucometer.  She needs to be checking blood sugars regularly to be able to adjust blood sugars.  If she has blood sugars significantly out of range, she will make a sooner appointment.  Otherwise we will check A1c prior to next appointment in 2 months.    2. Moderate episode of recurrent major depressive disorder (HCC)  Patient is doing well on current medications, she was switched over to Celexa 20 mg as she had some GI upset on sertraline.  She does not want to change  her dosing currently we will continue at Celexa 20 mg.    3. Hypertriglyceridemia  - Lipid Profile; Future  Patient will place on fenofibrate low-dose about 2 months ago.  She has been taking this.  She states that since her dental procedure she has been eating only Ramen and mashed potatoes.  Again, counseled on low carbohydrates.  Will recheck triglycerides before next appointment    4. Pain, dental  - Controlled Substance Treatment Agreement  - PAIN MANAGEMENT SCRN, W/ RFLX TO QNT; Future  I am agreeable with 1 week course of tramadol.  She will complete a urine drug test outpatient lab tomorrow once that is completed, will send in 1 week course of tramadol.      Follow-up: Return in about 2 months (around 5/29/2024) for Follow-up A1c, labs.

## 2024-04-05 DIAGNOSIS — G89.29 CHRONIC MIDLINE LOW BACK PAIN WITHOUT SCIATICA: ICD-10-CM

## 2024-04-05 DIAGNOSIS — M54.50 CHRONIC MIDLINE LOW BACK PAIN WITHOUT SCIATICA: ICD-10-CM

## 2024-04-16 DIAGNOSIS — K21.9 GASTROESOPHAGEAL REFLUX DISEASE, UNSPECIFIED WHETHER ESOPHAGITIS PRESENT: ICD-10-CM

## 2024-04-16 DIAGNOSIS — E11.9 TYPE 2 DIABETES MELLITUS WITHOUT COMPLICATION, WITHOUT LONG-TERM CURRENT USE OF INSULIN (HCC): ICD-10-CM

## 2024-04-16 DIAGNOSIS — Z31.9 DESIRE FOR PREGNANCY: ICD-10-CM

## 2024-04-16 NOTE — TELEPHONE ENCOUNTER
Received request via: Pharmacy    Was the patient seen in the last year in this department? Yes    Does the patient have an active prescription (recently filled or refills available) for medication(s) requested? No    [facility]    Does the [facility] and need 100 day supply (blood pressure, diabetes and cholesterol meds only)? Patient does not have SCP    Future Appointments         Provider [facility]    5/7/2024 1:15 PM Francy Guardado, PT [facility] [address]    5/28/2024 10:20 AM (Arrive by 10:05 AM) Nabila Mitchell M.D. [facility]    5/28/2024 11:00 AM Francy Guardado, PT [facility] [address]    5/31/2024 11:00 AM Francy Guardado, PT [facility] [address]    6/4/2024 11:00 AM Francy Guardado, PT [facility] [address]    6/7/2024 9:30 AM Francy Guardado, PT [facility] [address]    6/11/2024 11:00 AM Francy Guardado, PT [facility] [address]    6/14/2024 10:15 AM Francy Guardado, PT [facility] [address]    6/18/2024 11:00 AM Francy Guardado, PT [facility] [address]    6/21/2024 11:00 AM Francy Guardado, PT [facility] [address]

## 2024-04-19 RX ORDER — METFORMIN HYDROCHLORIDE 500 MG/1
500 TABLET, EXTENDED RELEASE ORAL DAILY
Qty: 30 TABLET | Refills: 5 | Status: SHIPPED | OUTPATIENT
Start: 2024-04-19

## 2024-04-19 RX ORDER — SWAB
1 SWAB, NON-MEDICATED MISCELLANEOUS DAILY
Qty: 30 TABLET | Refills: 5 | Status: SHIPPED | OUTPATIENT
Start: 2024-04-19

## 2024-04-19 RX ORDER — FAMOTIDINE 20 MG/1
20 TABLET, FILM COATED ORAL 2 TIMES DAILY
Qty: 60 TABLET | Refills: 5 | Status: SHIPPED | OUTPATIENT
Start: 2024-04-19

## 2024-04-30 DIAGNOSIS — R11.0 NAUSEA: Primary | ICD-10-CM

## 2024-04-30 PROCEDURE — RXMED WILLOW AMBULATORY MEDICATION CHARGE: Performed by: FAMILY MEDICINE

## 2024-04-30 RX ORDER — ONDANSETRON 4 MG/1
4 TABLET, ORALLY DISINTEGRATING ORAL EVERY 6 HOURS PRN
Qty: 30 TABLET | Refills: 1 | Status: SHIPPED | OUTPATIENT
Start: 2024-04-30

## 2024-04-30 NOTE — TELEPHONE ENCOUNTER
Received request via: Patient    Was the patient seen in the last year in this department? Yes    Does the patient have an active prescription (recently filled or refills available) for medication(s) requested? No    [facility]    Does the patient have [facility] and need 100 day supply (blood pressure, diabetes and cholesterol meds only)? Patient does not have SCP    Patient wants medication sent to them from [facility], already changed on script.    Future Appointments         Provider [facility]    5/7/2024 1:15 PM Francy Guardado, PT [facility] [address]    5/28/2024 10:20 AM (Arrive by 10:05 AM) Nabila Mitchell M.D. [facility]    5/28/2024 11:00 AM Francy Guardado, PT [facility] [address]    5/31/2024 11:00 AM Francy Guardado, PT [facility] [address]    6/4/2024 11:00 AM Francy Guardado, PT [facility] [address]    6/7/2024 9:30 AM Francy Guardado, PT [facility] [address]    6/11/2024 11:00 AM Francy Guardado, PT [facility] [address]    6/14/2024 10:15 AM Francy Guardado, PT [facility] [address]    6/18/2024 11:00 AM Francy Guardado, PT [facility] [address]    6/21/2024 11:00 AM Francy Guardado, PT [facility] [address]

## 2024-05-02 ENCOUNTER — PHARMACY VISIT (OUTPATIENT)
Dept: PHARMACY | Facility: MEDICAL CENTER | Age: 29
End: 2024-05-02
Payer: COMMERCIAL

## 2024-05-06 NOTE — OP THERAPY EVALUATION
"  Outpatient Physical Therapy  INITIAL EVALUATION    [facility]  [address]  [address]  [facility] [address]  Phone:  [phone]  Fax:  [phone]    Date of Evaluation: 05/07/2024    Patient: Anisha Tristan  YOB: 1995  MRN: [id number]     Referring Provider: Nabila Mitchell M.D.  [address]  [place] [address]   Referring Diagnosis Chronic midline low back pain without sciatica [M54.50, G89.29]     Time Calculation  Start time: 1315  Stop time: 1351 Time Calculation (min): 36 minutes         Chief Complaint: Back Problem    Visit Diagnoses     ICD-10-CM   1. Chronic midline low back pain without sciatica  M54.50    G89.29       Date of onset of impairment: 5/7/2024    Subjective:   History of Present Illness:     Mechanism of injury:  Pt states that a month ago she threw her back out when walking through her living room. She missed a whole week of work. Now it's doing better. She still feels she needs to use a wall to stretch her back out in a squat which helps. Pt is in the upper l/s. Stays local to back in center. No n/t. No mid back pain. She has never had an episode of back pain like that. She works at as a  at the [employer] so lots of standing. She is going on exercise walks 1x/day 3-4 days a week. (20-60 min). She wants to be able to walk a few hours for exercise.     Aggs:   Standing- inc's after 1-2 hrs, stretches against wall  Walking- inc's at 1 mile, can cont, stops at 2 miles  Sit- inc's after a few hours.   Working- pn inc's after a few hours w/ standing     Eases: deep wall squat    HPI 2/16/24  \"Pt reports long standing back pain however 4-5 days ago she \"pinched it\" with immediate pain and went to ER  Xrays show mild disc narrowing L4-L5  No saddle anesthesia. No numbness/tingling in the feet.  No falls.   Has never had it treated before   Right now she is taking methocarbamol but causes drwosiness  She has not been taking " "ibuprofen/tylenol \"  Quality of life:  Good  Pain:     Current pain ratin    At best pain ratin    At worst pain ratin  Patient Goals:     Other patient goals:  Be able to walk so she can loose weight.      Past Medical History:   Diagnosis Date   • ASTHMA    • Asthma    • Carpal tunnel syndrome    • Diabetes (HCC)    • Hyperlipidemia    • Morbid obesity (HCC)    • PCOS (polycystic ovarian syndrome)    • Vitamin D deficiency      Past Surgical History:   Procedure Laterality Date   • ORIF, ANKLE Right    • CARPAL TUNNEL RELEASE Bilateral    • EYE SURGERY     • OPEN REDUCTION       Social History     Tobacco Use   • Smoking status: Former     Current packs/day: 0.00     Average packs/day: 0.3 packs/day for 15.0 years (3.8 ttl pk-yrs)     Types: Cigarettes     Start date: 2/15/2006     Quit date: 2/15/2021     Years since quitting: 3.2   • Smokeless tobacco: Never   Substance Use Topics   • Alcohol use: Yes     Comment: occ.     Family and Occupational History     Socioeconomic History   • Marital status: Legally      Spouse name: Not on file   • Number of children: Not on file   • Years of education: Not on file   • Highest education level: Not on file   Occupational History   • Not on file       Objective     General Comments     Spine Comments   L/s AROM:  All WNL but pn at end range w/ all, worst pn w/ flexion     Repeated motions:   Flex: same   Ext: worse w/ repetitions     T/s AROM:  WNL     Bridge hold: 9\", able to lift 1/2 range, inc'd pn in back   Crunch hold: 7\" stopped d/t fatigue     Hip MMT:   Flex: 3+ bilat  Ext:  3+ bilat    Hip PROM:  Flex: ~95 deg bilat w/ LBP  IR: 20 deg bilat w/ LBP  ER: WNL        Therapeutic Exercises (CPT 75879):     2. bridge hold, 10x10\"    3. SLR, x30    4. SKTC w/ towel, 2x30\"    19. Certification Period: 2024 to  24      Therapeutic Exercise Summary: Access Code: [email or web address]  URL: [email or web address]  Date: 2024  Prepared " "by: Francy Guardado    Exercises  - Supine Bridge  - 1 x daily - 5 x weekly - 10 reps - 10 seconds hold  - Supine Active Straight Leg Raise  - 1 x daily - 5 x weekly - 3 sets - 10 reps  - Supine Single Knee to Chest Stretch  - 2 x daily - 7 x weekly - 2 reps - 30 seconds hold    Time-based treatments/modalities:    Physical Therapy Timed Treatment Charges  Therapeutic exercise minutes (CPT 42689): 10 minutes      Assessment, Response and Plan:   Impairments: abnormal ADL function, abnormal muscle tone, abnormal or restricted ROM, activity intolerance, difficulty performing job, impaired physical strength, lacks appropriate home exercise program, limited ADL's, limited mobility and pain with function    Assessment details:  Ms. Tristan is a 28 y/o female who presents in PT w/ s/s consistent w/ possible lumbar power coordination deficits. She presents w/ deficits in pn w/ end range l/s AROM globally, sig dec'd hip flex and IR AROM, sig hip weakness w/ l/s compensation noted, sig core weakness, and pain that are preventing her from working w/o inc'd pn and walking several miles w/o pn. Pt will cont to benefit from PT at this time in order to address her functional limitations and help her reach her functional goals.       Barriers to therapy:  Age and comorbidities  Prognosis: good    Goals:   Short Term Goals:   Pt will demo good compliance to HEP  Pt will demo bridge hold of 3'  Pt will demo l/s AROM w/ pn <2/10 globally  Pt will be yudi to walk 2+ miles w/o inc'd back pn  Short term goal time span:  4-6 weeks      Long Term Goals:    Pt will demo mod hallow hold for 30\"  Pt will demo global hip MMT of 4+/5  Pt will be able to stand throughout work and manage any sx's w/ HEP w/o limitations  Pt will improve TR to <15  Long term goal time span:  1-2 months    Plan:   Therapy options:  Physical therapy treatment to continue  Planned therapy interventions:  Neuromuscular Re-education (CPT 20856) and Therapeutic Exercise " (CPT 74601)  Frequency:  1x week  Duration in weeks:  10  Duration in visits:  10  Discussed with:  Patient  Plan details:  Progress core strength progression and functional hip strengthening, l/s endurance       Functional Assessment Used  Oswestry Low Back Pain Disability Total Score: 36     Referring provider co-signature:  I have reviewed this plan of care and my co-signature certifies the need for services.    Certification Period: 05/07/2024 to  07/16/24    Physician Signature: ________________________________ Date: ______________

## 2024-05-07 ENCOUNTER — PHYSICAL THERAPY (OUTPATIENT)
Dept: PHYSICAL THERAPY | Facility: REHABILITATION | Age: 29
End: 2024-05-07
Attending: FAMILY MEDICINE
Payer: COMMERCIAL

## 2024-05-07 DIAGNOSIS — G89.29 CHRONIC MIDLINE LOW BACK PAIN WITHOUT SCIATICA: ICD-10-CM

## 2024-05-07 DIAGNOSIS — M54.50 CHRONIC MIDLINE LOW BACK PAIN WITHOUT SCIATICA: ICD-10-CM

## 2024-05-07 SDOH — ECONOMIC STABILITY: GENERAL: QUALITY OF LIFE: GOOD

## 2024-05-07 ASSESSMENT — ENCOUNTER SYMPTOMS
PAIN SCALE AT HIGHEST: 5
PAIN SCALE AT LOWEST: 1
PAIN SCALE: 2

## 2024-05-14 ENCOUNTER — HOSPITAL ENCOUNTER (OUTPATIENT)
Dept: LAB | Facility: MEDICAL CENTER | Age: 29
End: 2024-05-14
Attending: FAMILY MEDICINE
Payer: COMMERCIAL

## 2024-05-14 ENCOUNTER — OFFICE VISIT (OUTPATIENT)
Dept: MEDICAL GROUP | Facility: MEDICAL CENTER | Age: 29
End: 2024-05-14
Attending: FAMILY MEDICINE
Payer: COMMERCIAL

## 2024-05-14 VITALS
WEIGHT: 214 LBS | TEMPERATURE: 97.1 F | HEIGHT: 61 IN | SYSTOLIC BLOOD PRESSURE: 104 MMHG | BODY MASS INDEX: 40.4 KG/M2 | DIASTOLIC BLOOD PRESSURE: 70 MMHG | HEART RATE: 80 BPM | OXYGEN SATURATION: 98 % | RESPIRATION RATE: 14 BRPM

## 2024-05-14 DIAGNOSIS — E78.1 HYPERTRIGLYCERIDEMIA: ICD-10-CM

## 2024-05-14 DIAGNOSIS — N92.6 MISSED PERIOD: ICD-10-CM

## 2024-05-14 DIAGNOSIS — E11.9 TYPE 2 DIABETES MELLITUS WITHOUT COMPLICATION, WITHOUT LONG-TERM CURRENT USE OF INSULIN (HCC): ICD-10-CM

## 2024-05-14 DIAGNOSIS — R11.0 NAUSEA: ICD-10-CM

## 2024-05-14 DIAGNOSIS — Z30.016 ENCOUNTER FOR INITIAL PRESCRIPTION OF TRANSDERMAL PATCH HORMONAL CONTRACEPTIVE DEVICE: ICD-10-CM

## 2024-05-14 LAB
B-HCG SERPL-ACNC: <1 MIU/ML (ref 0–5)
CHOLEST SERPL-MCNC: 147 MG/DL (ref 100–199)
HBA1C MFR BLD: 6.9 % (ref ?–5.8)
HDLC SERPL-MCNC: 32 MG/DL
LDLC SERPL CALC-MCNC: 61 MG/DL
POCT INT CON NEG: NEGATIVE
POCT INT CON NEG: NEGATIVE
POCT INT CON POS: POSITIVE
POCT INT CON POS: POSITIVE
POCT URINE PREGNANCY TEST: NEGATIVE
TRIGL SERPL-MCNC: 269 MG/DL (ref 0–149)

## 2024-05-14 PROCEDURE — 3074F SYST BP LT 130 MM HG: CPT | Performed by: FAMILY MEDICINE

## 2024-05-14 PROCEDURE — 3078F DIAST BP <80 MM HG: CPT | Performed by: FAMILY MEDICINE

## 2024-05-14 PROCEDURE — 99214 OFFICE O/P EST MOD 30 MIN: CPT | Performed by: FAMILY MEDICINE

## 2024-05-14 RX ORDER — NORELGESTROMIN AND ETHINYL ESTRADIOL 35; 150 UG/MG; UG/MG
1 PATCH TRANSDERMAL
Qty: 9 PATCH | Refills: 3 | Status: SHIPPED | OUTPATIENT
Start: 2024-05-14

## 2024-05-14 RX ORDER — ONDANSETRON 4 MG/1
4 TABLET, ORALLY DISINTEGRATING ORAL EVERY 6 HOURS PRN
Qty: 30 TABLET | Refills: 1 | Status: SHIPPED | OUTPATIENT
Start: 2024-05-14

## 2024-05-14 RX ORDER — LIRAGLUTIDE 6 MG/ML
1.2 INJECTION SUBCUTANEOUS DAILY
Qty: 6 ML | Refills: 5 | Status: SHIPPED | OUTPATIENT
Start: 2024-05-14 | End: 2024-05-16

## 2024-05-14 ASSESSMENT — FIBROSIS 4 INDEX: FIB4 SCORE: 0.33

## 2024-05-14 NOTE — ASSESSMENT & PLAN NOTE
- Patient is sexually active and interested in starting birth control. In the past, she has used: combined OCPs.   - Family or personal history of clotting disorders, PEs, or DVTs: no   - Hx of migraines: yes, no aura  - Contraindications? none

## 2024-05-14 NOTE — ASSESSMENT & PLAN NOTE
Meds: metformin 500mg daily, victoza 1.2mg daily. No missed doses    BG: AM fasting - 115-160. Evenings can get up to 300s     Diet: hard to avoid carbs as food is difficult to buy due to finances.   Exercise: she is walking once a day, yoga

## 2024-05-14 NOTE — PROGRESS NOTES
Subjective:     CC:   Chief Complaint   Patient presents with    Follow-Up     Possible pregnancy         HPI:     Pt presents today for possible pregnancy test. She had a positive upreg 1 week ago but was a very faint line. She repeated and that was invalid. Neg upreg today in office.   LMP: Jan or Feb.   Inconsistent periods, once every couple of months   She does have a dx of pcos   Last sexually active this morning, previous to that 1 week ago, and then 1 week prior to that.   Unprotected sex.   Pt reports significant nausea.     Encounter for initial prescription of transdermal patch hormonal contraceptive device  - Patient is sexually active and interested in starting birth control. In the past, she has used: combined OCPs.   - Family or personal history of clotting disorders, PEs, or DVTs: no   - Hx of migraines: yes, no aura  - Contraindications? none        Type 2 diabetes mellitus without complication, without long-term current use of insulin (HCC)  Meds: metformin 500mg daily, victoza 1.2mg daily. No missed doses    BG: AM fasting - 115-160. Evenings can get up to 300s     Diet: hard to avoid carbs as food is difficult to buy due to finances.   Exercise: she is walking once a day, yoga         Past Medical History:   Diagnosis Date    ASTHMA     Asthma     Carpal tunnel syndrome     Diabetes (HCC)     Hyperlipidemia     Morbid obesity (HCC)     PCOS (polycystic ovarian syndrome)     Vitamin D deficiency        Social History     Tobacco Use    Smoking status: Former     Current packs/day: 0.00     Average packs/day: 0.3 packs/day for 15.0 years (3.8 ttl pk-yrs)     Types: Cigarettes     Start date: 2/15/2006     Quit date: 2/15/2021     Years since quitting: 3.2    Smokeless tobacco: Never   Vaping Use    Vaping Use: Every day    Substances: Nicotine, Flavoring    Devices: Disposable   Substance Use Topics    Alcohol use: Yes     Comment: occ.    Drug use: Not Currently     Types: Methamphetamines,  Inhaled     Comment: history of meth from 8879-5012, no IV, one use a couple months ago        Current Outpatient Medications Ordered in Epic   Medication Sig Dispense Refill    norelgestromin-ethinyl estradiol (ORTHO EVRA) 150-35 MCG/24HR patch Place 1 Patch on the skin every 7 days. 9 Patch 3    liraglutide (VICTOZA) 18 MG/3ML Solution Pen-injector Inject 1.2 mg under the skin every day. After 1 week increase to 1.2mg daily 6 mL 5    ondansetron (ZOFRAN ODT) 4 MG TABLET DISPERSIBLE Take 1 Tablet by mouth every 6 hours as needed for Nausea/Vomiting. 30 Tablet 1    metFORMIN ER (GLUCOPHAGE XR) 500 MG TABLET SR 24 HR Take 1 tablet by mouth once daily 30 Tablet 5    famotidine (PEPCID) 20 MG Tab Take 1 tablet by mouth twice daily 60 Tablet 5    prenatal vitamin with Fe/FA (SANDOR PRENATAL) 28-0.8 MG Tab Take 1 tablet by mouth once daily 30 Tablet 5    Lancets Use one to check glucoses daily morning before breakfast 100 Each 5    citalopram (CELEXA) 20 MG Tab Take 1.5 Tablets by mouth every day. 45 Tablet 5    lidocaine (LIDODERM) 5 % Patch Place 1 Patch on the skin every 24 hours. 10 Patch 0    acetaminophen (TYLENOL) 500 MG Tab Take 1-2 Tablets by mouth every 6 hours as needed for Mild Pain or Moderate Pain. 30 Tablet 0    ibuprofen (MOTRIN) 600 MG Tab Take 1 Tablet by mouth every 6 hours as needed for Mild Pain or Moderate Pain. 30 Tablet 0    Insulin Pen Needle 32 G x 4 mm Use 1 Each every day. 100 Each 2    Blood Glucose Monitoring Suppl (BLOOD GLUCOSE MONITOR SYSTEM) w/Device Kit Test blood sugar as recommended by provider. 1 Kit 0    glucose blood strip Test glucose daily in the morning fasting 100 Strip 5    atorvastatin (LIPITOR) 10 MG Tab Take 1 Tablet by mouth every evening. 30 Tablet 5    fenofibrate micronized (LOFIBRA) 67 MG capsule Take 1 Capsule by mouth every morning before breakfast. 30 Capsule 5    albuterol (PROVENTIL HFA) 108 (90 Base) MCG/ACT Aero Soln inhalation aerosol Inhale 2 Puffs every 6  "hours as needed for Shortness of Breath. 8.5 g 5    dicyclomine (BENTYL) 20 MG Tab Take 1 Tablet by mouth 3 times a day before meals. 30 Tablet 5    loperamide (IMODIUM) 2 MG Cap Take 1 Capsule by mouth 1 time a day as needed for Diarrhea. 30 Capsule 2    hydrocortisone 2.5 % Cream topical cream Apply thin layer to areas of eczema daily as needed 30 g 3     No current Epic-ordered facility-administered medications on file.       Allergies:  Abilify, Norco [apap-fd&c blue #1-hydrocodone], Norco [hydrocodone-acetaminophen], and Prazosin        Objective:       Exam:  /70 (BP Location: Left arm, Patient Position: Sitting)   Pulse 80   Temp 36.2 °C (97.1 °F) (Temporal)   Resp 14   Ht 1.549 m (5' 1\")   Wt 97.1 kg (214 lb)   SpO2 98%   BMI 40.43 kg/m²  Body mass index is 40.43 kg/m².    Constitutional: Alert, no distress, well-groomed.  Respiratory: Unlabored respiratory effort, no cough.  MSK: moves all extremities.  Psych: normal affect and mood.      Data reviewed:   Hcg in office is neg    Assessment & Plan:     29 y.o. female with the following -     1. Missed period  - HCG QUANTITATIVE; Future  - POCT Pregnancy  2. Nausea  - ondansetron (ZOFRAN ODT) 4 MG TABLET DISPERSIBLE; Take 1 Tablet by mouth every 6 hours as needed for Nausea/Vomiting.  Dispense: 30 Tablet; Refill: 1  Sending to lab for HCG quant since we cannot rely on her LMP.   Neg pregnancy test.  More zofran for nausea    3. Type 2 diabetes mellitus without complication, without long-term current use of insulin ([facility])  - liraglutide (VICTOZA) 18 MG/3ML Solution Pen-injector; Inject 1.2 mg under the skin every day. After 1 week increase to 1.2mg daily  Dispense: 6 mL; Refill: 5  - POCT Hemoglobin A1C  Continue current victoza, at goal < 7%  Difficulty with food insecurity to find foods to eat that are low carb  Get in more activity, which she is working on    4. Encounter for initial prescription of transdermal patch hormonal contraceptive " device  - norelgestromin-ethinyl estradiol (ORTHO EVRA) 150-35 MCG/24HR patch; Place 1 Patch on the skin every 7 days.  Dispense: 9 Patch; Refill: 3  If preg test is neg, pt is advised to start on birth control as her medications are not safe for pregnancy. She is agreeable, she states her current partner have been together for only 3 mos    Return in about 1 month (around 6/14/2024) for pap.    Please note that this dictation was created using voice recognition software. I have made every reasonable attempt to correct obvious errors, but I expect that there are errors of grammar and possibly content that I did not discover before finalizing the note.

## 2024-05-16 RX ORDER — LIRAGLUTIDE 6 MG/ML
1.2 INJECTION SUBCUTANEOUS DAILY
Qty: 6 ML | Refills: 5 | Status: SHIPPED | OUTPATIENT
Start: 2024-05-16

## 2024-05-30 NOTE — OP THERAPY DAILY TREATMENT
"  Outpatient Physical Therapy  DAILY TREATMENT     [facility] [address].  [address]  [place] [address]  Phone:  [phone]  Fax:  [phone]    Date: 05/31/2024    Patient: Anisha Tristan  YOB: 1995  MRN: [id number]     Time Calculation                   Chief Complaint: No chief complaint on file.    Visit #: 2    SUBJECTIVE:  ***    Aggs:   Standing- inc's after 1-2 hrs, stretches against wall  Walking- inc's at 1 mile, can cont, stops at 2 miles  Sit- inc's after a few hours.   Working- pn inc's after a few hours w/ standing      Eases: deep wall squat    OBJECTIVE:  L/s AROM:  All WNL but pn at end range w/ all, worst pn w/ flexion      Repeated motions:   Flex: same   Ext: worse w/ repetitions      T/s AROM:  WNL      Bridge hold: 9\", able to lift 1/2 range, inc'd pn in back   Crunch hold: 7\" stopped d/t fatigue      Hip MMT:   Flex: 3+ bilat  Ext:  3+ bilat     Hip PROM:  Flex: ~95 deg bilat w/ LBP  IR: 20 deg bilat w/ LBP  ER: WNL          Therapeutic Exercises (CPT 88494):     2. bridge hold, 10x10\"    3. SLR, x30    4. SKTC w/ towel, 2x30\"    19. Certification Period: 05/07/2024 to  07/16/24      Therapeutic Exercise Summary: Access Code: [email or web address]  URL: [email or web address]  Date: 05/07/2024  Prepared by: Francy Guardado    Exercises  - Supine Bridge  - 1 x daily - 5 x weekly - 10 reps - 10 seconds hold  - Supine Active Straight Leg Raise  - 1 x daily - 5 x weekly - 3 sets - 10 reps  - Supine Single Knee to Chest Stretch  - 2 x daily - 7 x weekly - 2 reps - 30 seconds hold      Time-based treatments/modalities:           ASSESSMENT:   Response to treatment: ***    Goals:   Short Term Goals:   Pt will demo good compliance to HEP  Pt will demo bridge hold of 3'  Pt will demo l/s AROM w/ pn <2/10 globally  Pt will be yudi to walk 2+ miles w/o inc'd back pn  Short term goal time span:  4-6 weeks      Long Term Goals:    Pt will demo mod hallow " "hold for 30\"  Pt will demo global hip MMT of 4+/5  Pt will be able to stand throughout work and manage any sx's w/ HEP w/o limitations  Pt will improve TR to <15  Long term goal time span:  1-2 months    PLAN/RECOMMENDATIONS:    Progress core strength progression and functional hip strengthening, l/s endurance     "

## 2024-05-31 ENCOUNTER — APPOINTMENT (OUTPATIENT)
Dept: PHYSICAL THERAPY | Facility: REHABILITATION | Age: 29
End: 2024-05-31
Attending: FAMILY MEDICINE
Payer: COMMERCIAL

## 2024-05-31 ENCOUNTER — TELEPHONE (OUTPATIENT)
Dept: PHYSICAL THERAPY | Facility: REHABILITATION | Age: 29
End: 2024-05-31
Payer: COMMERCIAL

## 2024-05-31 NOTE — OP THERAPY DISCHARGE SUMMARY
Outpatient Physical Therapy  DISCHARGE SUMMARY NOTE      [facility] [address].  [address]  [place] [address]  Phone:  [phone]  Fax:  [phone]    Date of Visit: 05/31/2024    Patient: Anisha Tristan  YOB: 1995  MRN: [id number]     Referring Provider: Nabila Mitchell M.D.    Referring Diagnosis Chronic midline low back pain without sciatica [M54.50, G89.29]        Your patient is being discharged from Physical Therapy with the following comments:   Patient cancelled or missed more than 2 scheduled appointments/non-compliant    Comments:  Pt is self discharged at this time d/t our late cancel or no show policy. They were advised in previous sessions to cont compliance to HEP and contact PT as needed w/ any questions or concerns. Pt may return to PT in future as needed w/ new referral and check in w/referring provider.          Francy Guardado, PT    Date: 5/31/2024

## 2024-06-04 ENCOUNTER — APPOINTMENT (OUTPATIENT)
Dept: PHYSICAL THERAPY | Facility: REHABILITATION | Age: 29
End: 2024-06-04
Attending: FAMILY MEDICINE
Payer: COMMERCIAL

## 2024-06-07 ENCOUNTER — APPOINTMENT (OUTPATIENT)
Dept: PHYSICAL THERAPY | Facility: REHABILITATION | Age: 29
End: 2024-06-07
Attending: FAMILY MEDICINE
Payer: COMMERCIAL

## 2024-06-11 ENCOUNTER — APPOINTMENT (OUTPATIENT)
Dept: PHYSICAL THERAPY | Facility: REHABILITATION | Age: 29
End: 2024-06-11
Attending: FAMILY MEDICINE
Payer: COMMERCIAL

## 2024-06-14 ENCOUNTER — APPOINTMENT (OUTPATIENT)
Dept: PHYSICAL THERAPY | Facility: REHABILITATION | Age: 29
End: 2024-06-14
Attending: FAMILY MEDICINE
Payer: COMMERCIAL

## 2024-06-18 ENCOUNTER — APPOINTMENT (OUTPATIENT)
Dept: PHYSICAL THERAPY | Facility: REHABILITATION | Age: 29
End: 2024-06-18
Attending: FAMILY MEDICINE
Payer: COMMERCIAL

## 2024-06-21 ENCOUNTER — APPOINTMENT (OUTPATIENT)
Dept: PHYSICAL THERAPY | Facility: REHABILITATION | Age: 29
End: 2024-06-21
Attending: FAMILY MEDICINE
Payer: COMMERCIAL

## 2024-08-08 ENCOUNTER — HOSPITAL ENCOUNTER (EMERGENCY)
Facility: MEDICAL CENTER | Age: 29
End: 2024-08-08
Attending: STUDENT IN AN ORGANIZED HEALTH CARE EDUCATION/TRAINING PROGRAM

## 2024-08-08 VITALS
HEART RATE: 90 BPM | DIASTOLIC BLOOD PRESSURE: 78 MMHG | TEMPERATURE: 97 F | OXYGEN SATURATION: 96 % | WEIGHT: 224.21 LBS | BODY MASS INDEX: 42.33 KG/M2 | SYSTOLIC BLOOD PRESSURE: 135 MMHG | RESPIRATION RATE: 16 BRPM | HEIGHT: 61 IN

## 2024-08-08 DIAGNOSIS — J01.00 ACUTE NON-RECURRENT MAXILLARY SINUSITIS: ICD-10-CM

## 2024-08-08 PROCEDURE — 99282 EMERGENCY DEPT VISIT SF MDM: CPT

## 2024-08-08 RX ORDER — AMOXICILLIN 500 MG/1
1000 CAPSULE ORAL 3 TIMES DAILY
Qty: 42 CAPSULE | Refills: 0 | Status: ACTIVE | OUTPATIENT
Start: 2024-08-08 | End: 2024-08-15

## 2024-08-08 RX ORDER — AMOXICILLIN 500 MG/1
1000 CAPSULE ORAL 3 TIMES DAILY
Qty: 42 CAPSULE | Refills: 0 | Status: SHIPPED | OUTPATIENT
Start: 2024-08-08 | End: 2024-08-08

## 2024-08-08 ASSESSMENT — PAIN DESCRIPTION - PAIN TYPE: TYPE: ACUTE PAIN

## 2024-08-08 ASSESSMENT — FIBROSIS 4 INDEX: FIB4 SCORE: 0.33

## 2024-08-08 NOTE — Clinical Note
Anisha Tristan was seen and treated in our emergency department on 8/8/2024.  She may return to work on 08/08/2024.       If you have any questions or concerns, please don't hesitate to call.      Elias Tavarez

## 2024-08-09 NOTE — ED NOTES
ERP at bedside.  
Pt ambulated back to ortho 01 with a steady gait without incident. Primary assessment complete. Chart up for ERP.    
Pt provided discharge instructions and educated by ERP. Pt verbalized understanding of instructions with no questions at this time. Pt ambulated back out to the ED lobby without assistance.     
No

## 2024-08-09 NOTE — ED PROVIDER NOTES
CHIEF COMPLAINT  Chief Complaint   Patient presents with    Nasal Congestion     X 3 days       LIMITATION TO HISTORY   Select:     HPI    Anisha Tristan is a 29 y.o. female who presents to the Emergency Department for evaluation of severe pain around her nose and sinuses for the last 3 days patient feels like her face is somewhat swollen denies fevers or cough reports yellow discharge from her nose reports she is taking Motrin and Tylenol without relief    OUTSIDE HISTORIAN(S):  Select:    EXTERNAL RECORDS REVIEWED  Select: Other       PAST MEDICAL HISTORY  Past Medical History:   Diagnosis Date    ASTHMA     Asthma     Carpal tunnel syndrome     Diabetes (HCC)     Hyperlipidemia     Morbid obesity (HCC)     PCOS (polycystic ovarian syndrome)     Vitamin D deficiency      .    SURGICAL HISTORY  Past Surgical History:   Procedure Laterality Date    ORIF, ANKLE Right 2010    CARPAL TUNNEL RELEASE Bilateral     EYE SURGERY      OPEN REDUCTION           FAMILY HISTORY  Family History   Problem Relation Age of Onset    Cancer Mother         throat    Alcohol/Drug Mother     Cancer Maternal Uncle     Diabetes Maternal Grandmother     Hypertension Maternal Grandmother     Heart Disease Neg Hx     Stroke Neg Hx           SOCIAL HISTORY  Social History     Socioeconomic History    Marital status: Legally      Spouse name: Not on file    Number of children: Not on file    Years of education: Not on file    Highest education level: Not on file   Occupational History    Not on file   Tobacco Use    Smoking status: Former     Current packs/day: 0.00     Average packs/day: 0.3 packs/day for 15.0 years (3.8 ttl pk-yrs)     Types: Cigarettes     Start date: 2/15/2006     Quit date: 2/15/2021     Years since quitting: 3.4    Smokeless tobacco: Never   Vaping Use    Vaping status: Every Day    Substances: Nicotine, Flavoring    Devices: Disposable   Substance and Sexual Activity    Alcohol use: Yes     Comment: occ.     Drug use: Not Currently     Types: Methamphetamines, Inhaled     Comment: history of meth from 5872-0510, no IV, one use a couple months ago     Sexual activity: Yes     Partners: Male     Birth control/protection: Condom   Other Topics Concern    Not on file   Social History Narrative    ** Merged History Encounter **          Social Determinants of Health     Financial Resource Strain: Not on file   Food Insecurity: Not on file   Transportation Needs: Not on file   Physical Activity: Not on file   Stress: Not on file   Social Connections: Not on file   Intimate Partner Violence: Not on file   Housing Stability: Not on file         CURRENT MEDICATIONS  No current facility-administered medications on file prior to encounter.     Current Outpatient Medications on File Prior to Encounter   Medication Sig Dispense Refill    liraglutide (VICTOZA) 18 MG/3ML Solution Pen-injector Inject 1.2 mg under the skin every day. 6 mL 5    norelgestromin-ethinyl estradiol (ORTHO EVRA) 150-35 MCG/24HR patch Place 1 Patch on the skin every 7 days. 9 Patch 3    ondansetron (ZOFRAN ODT) 4 MG TABLET DISPERSIBLE Take 1 Tablet by mouth every 6 hours as needed for Nausea/Vomiting. 30 Tablet 1    metFORMIN ER (GLUCOPHAGE XR) 500 MG TABLET SR 24 HR Take 1 tablet by mouth once daily 30 Tablet 5    famotidine (PEPCID) 20 MG Tab Take 1 tablet by mouth twice daily 60 Tablet 5    prenatal vitamin with Fe/FA (SANDOR PRENATAL) 28-0.8 MG Tab Take 1 tablet by mouth once daily 30 Tablet 5    Lancets Use one to check glucoses daily morning before breakfast 100 Each 5    citalopram (CELEXA) 20 MG Tab Take 1.5 Tablets by mouth every day. 45 Tablet 5    lidocaine (LIDODERM) 5 % Patch Place 1 Patch on the skin every 24 hours. 10 Patch 0    acetaminophen (TYLENOL) 500 MG Tab Take 1-2 Tablets by mouth every 6 hours as needed for Mild Pain or Moderate Pain. 30 Tablet 0    ibuprofen (MOTRIN) 600 MG Tab Take 1 Tablet by mouth every 6 hours as needed for Mild  "Pain or Moderate Pain. 30 Tablet 0    Insulin Pen Needle 32 G x 4 mm Use 1 Each every day. 100 Each 2    Blood Glucose Monitoring Suppl (BLOOD GLUCOSE MONITOR SYSTEM) w/Device Kit Test blood sugar as recommended by provider. 1 Kit 0    glucose blood strip Test glucose daily in the morning fasting 100 Strip 5    atorvastatin (LIPITOR) 10 MG Tab Take 1 Tablet by mouth every evening. 30 Tablet 5    fenofibrate micronized (LOFIBRA) 67 MG capsule Take 1 Capsule by mouth every morning before breakfast. 30 Capsule 5    albuterol (PROVENTIL HFA) 108 (90 Base) MCG/ACT Aero Soln inhalation aerosol Inhale 2 Puffs every 6 hours as needed for Shortness of Breath. 8.5 g 5    dicyclomine (BENTYL) 20 MG Tab Take 1 Tablet by mouth 3 times a day before meals. 30 Tablet 5    loperamide (IMODIUM) 2 MG Cap Take 1 Capsule by mouth 1 time a day as needed for Diarrhea. 30 Capsule 2    hydrocortisone 2.5 % Cream topical cream Apply thin layer to areas of eczema daily as needed 30 g 3           ALLERGIES  Allergies   Allergen Reactions    Abilify Unspecified     Pt states that the medication affects her mobility, room starts spinning, unable to walk or use hands or feet, black outs    Norco [Apap-Fd&C Blue #1-Hydrocodone] Vomiting    Norco [Hydrocodone-Acetaminophen] Vomiting    Prazosin        PHYSICAL EXAM  VITAL SIGNS:/78   Pulse 90   Temp 36.1 °C (97 °F) (Temporal)   Resp 16   Ht 1.549 m (5' 1\")   Wt 102 kg (224 lb 3.3 oz)   SpO2 96%   BMI 42.36 kg/m²       GENERAL: Awake, on gurney  HEAD: Normocephalic, tender to palpation of the maxillary sinus bilaterally  NECK: Normal range of motion, non-tender midline cervical spine  EYES: PERRL, + EOMI, conjunctiva white  ENT: Tympanic membranes gray, Mucous membranes moist, oropharynx clear  PULMONARY: Normal effort, clear to auscultation bilaterally  CARDIOVASCULAR: No murmurs, clicks or rubs, peripheral pulses 2+  ABDOMINAL: Soft, non-tender, no pulsatile masses  BACK: no " costovertebral tenderness, no midline tenderness, no step off deformity  NEUROLOGICAL: Grossly non-focal neurologic examination, speech normal  EXTREMITIES: No edema, no signs of extremity trauma  SKIN: Warm and dry  PSYCHIATRIC: Affect is appropriate    DIAGNOSTIC STUDIES / PROCEDURES  EKG           COURSE & MEDICAL DECISION MAKING    ED COURSE:      INTERVENTIONS BY ME:      INITIAL ASSESSMENT, COURSE AND PLAN  Care Narrative:     Patient presenting with pain in the sinuses with yellow discharge no appreciable swelling on exam no signs of orbital cellulitis or cavernous venous thrombosis based on examination of the cranial nerves and skin, given degree of pain and discomfort discussed risk and benefits of antibiotic prescription discussed likely culprit of the viral cause of sinusitis however given patient's pain worsening symptomatology we will proceed with antibiotic prescription return precautions provided should her symptoms fail to improve or worsen consider CT of the maxillofacial however feel sinusitis can be diagnosed based on symptoms alone do not see signs of a con commitment pathology         ADDITIONAL PROBLEM LIST    DISPOSITION AND DISCUSSIONS    Escalation of care considered, and ultimately not performed:diagnostic imaging        Decision tools and prescription drugs considered including, but not limited to: Prescribed antibiotics.    FINAL DIAGNOSIS  1. Acute non-recurrent maxillary sinusitis             Electronically signed by: Elias Tavarez DO ,12:49 AM 08/09/24

## 2024-08-09 NOTE — ED TRIAGE NOTES
"Chief Complaint   Patient presents with    Nasal Congestion     X 3 days   Pt states she has had sinus pain for 3 days, feels swollen and its into her face, denies trauma. Pt denies any other symptoms and has not been sick recently. Pt ax0x4, ambulatory to triage. Pt educated on wait times and triage process.     /78   Pulse 94   Temp 36.5 °C (97.7 °F) (Temporal)   Resp 16   Ht 1.549 m (5' 1\")   Wt 102 kg (224 lb 3.3 oz)   SpO2 98%   BMI 42.36 kg/m² \  "

## 2024-09-26 ENCOUNTER — NON-PROVIDER VISIT (OUTPATIENT)
Dept: OCCUPATIONAL MEDICINE | Facility: CLINIC | Age: 29
End: 2024-09-26

## 2024-10-08 ENCOUNTER — OFFICE VISIT (OUTPATIENT)
Dept: OCCUPATIONAL MEDICINE | Facility: CLINIC | Age: 29
End: 2024-10-08

## 2024-10-08 DIAGNOSIS — Z02.4 ENCOUNTER FOR COMMERCIAL DRIVER MEDICAL EXAMINATION (CDME): Primary | ICD-10-CM

## 2024-10-08 PROCEDURE — 7100 PR DOT PHYSICAL: Performed by: NURSE PRACTITIONER

## 2024-11-14 ENCOUNTER — HOSPITAL ENCOUNTER (EMERGENCY)
Facility: MEDICAL CENTER | Age: 29
End: 2024-11-14
Attending: EMERGENCY MEDICINE
Payer: COMMERCIAL

## 2024-11-14 VITALS
DIASTOLIC BLOOD PRESSURE: 79 MMHG | RESPIRATION RATE: 18 BRPM | HEART RATE: 83 BPM | SYSTOLIC BLOOD PRESSURE: 109 MMHG | BODY MASS INDEX: 40.53 KG/M2 | OXYGEN SATURATION: 94 % | TEMPERATURE: 97.4 F | WEIGHT: 214.51 LBS

## 2024-11-14 DIAGNOSIS — N30.01 ACUTE CYSTITIS WITH HEMATURIA: ICD-10-CM

## 2024-11-14 LAB
AMORPHOUS CRYSTALS 1764: PRESENT /HPF
APPEARANCE UR: ABNORMAL
BACTERIA #/AREA URNS HPF: ABNORMAL /HPF
BILIRUB UR QL STRIP.AUTO: ABNORMAL
CASTS URNS QL MICRO: ABNORMAL /LPF (ref 0–2)
COLOR UR: ABNORMAL
EPITHELIAL CELLS 1715: ABNORMAL /HPF (ref 0–5)
GLUCOSE UR STRIP.AUTO-MCNC: 250 MG/DL
KETONES UR STRIP.AUTO-MCNC: 15 MG/DL
LEUKOCYTE ESTERASE UR QL STRIP.AUTO: ABNORMAL
MICRO URNS: ABNORMAL
NITRITE UR QL STRIP.AUTO: NEGATIVE
PH UR STRIP.AUTO: 5.5 [PH] (ref 5–8)
PROT UR QL STRIP: 30 MG/DL
RBC # URNS HPF: ABNORMAL /HPF (ref 0–2)
RBC UR QL AUTO: ABNORMAL
SP GR UR STRIP.AUTO: 1.03
T VAGINALIS URNS QL MICRO: PRESENT /HPF
UROBILINOGEN UR STRIP.AUTO-MCNC: 1 EU/DL
WBC #/AREA URNS HPF: ABNORMAL /HPF

## 2024-11-14 PROCEDURE — 87077 CULTURE AEROBIC IDENTIFY: CPT

## 2024-11-14 PROCEDURE — 87086 URINE CULTURE/COLONY COUNT: CPT

## 2024-11-14 PROCEDURE — 99284 EMERGENCY DEPT VISIT MOD MDM: CPT

## 2024-11-14 PROCEDURE — 81001 URINALYSIS AUTO W/SCOPE: CPT

## 2024-11-14 RX ORDER — CEPHALEXIN 500 MG/1
1000 CAPSULE ORAL 3 TIMES DAILY
Qty: 42 CAPSULE | Refills: 0 | Status: ACTIVE | OUTPATIENT
Start: 2024-11-14 | End: 2024-11-21

## 2024-11-14 RX ORDER — IBUPROFEN 800 MG/1
800 TABLET, FILM COATED ORAL EVERY 8 HOURS PRN
Qty: 15 TABLET | Refills: 0 | Status: SHIPPED | OUTPATIENT
Start: 2024-11-14

## 2024-11-14 ASSESSMENT — FIBROSIS 4 INDEX: FIB4 SCORE: 0.33

## 2024-11-14 NOTE — ED TRIAGE NOTES
Chief Complaint   Patient presents with    Pelvic Pain    Painful Urination     /75   Pulse (!) 107   Temp 36.3 °C (97.4 °F) (Temporal)   Resp 20   Wt 97.3 kg (214 lb 8.1 oz)   SpO2 95%   Pt informed of wait times. Educated on triage process.  Asked to return to triage RN for any new or worsening of symptoms. Thanked for patience.

## 2024-11-14 NOTE — Clinical Note
Anisha Tristan was seen and treated in our emergency department on 11/14/2024.  She may return to work on 11/15/2024.       If you have any questions or concerns, please don't hesitate to call.      Jason Peterson M.D.

## 2024-11-14 NOTE — LETTER
11/16/2024               Anisha Jaleesa Kun  [address] [address]        Dear Anisha (MR#[id number])    This letter is sent in regards to your recent visit to the [facility] Emergency Department on 11/14/2024. During the visit, tests were performed to assist the physician in your medical diagnosis. A review of your tests requires that we notify you of the following:    Your urine culture was POSITIVE for a bacteria called streptococcus agalactiae. The antibiotic prescribed for you (cephalexin) should be active to treat this bacteria. It is important that you continue taking your antibiotic until it is finished.     Please feel free to contact me at the number below if you have any questions or concerns. Thank you for your cooperation in the matter.    Sincerely,  ED Culture Follow-Up Staff  Adrien Garrett, PharmD    [facility], Emergency Department  [address] [place] [id number] [phone] (ED Culture Line)

## 2024-11-14 NOTE — ED PROVIDER NOTES
ED Provider Note    CHIEF COMPLAINT  Chief Complaint   Patient presents with    Pelvic Pain    Painful Urination       EXTERNAL RECORDS REVIEWED  PDMP no controlled substances    HPI/ROS  LIMITATION TO HISTORY   Select: : None  OUTSIDE HISTORIAN(S):    None    Anisha Tristan is a 29 y.o. female who presents stating that she has a history of diabetes, she reports that she has urinary frequency, urgency, she denies any back pain, no vomiting.    PAST MEDICAL HISTORY   has a past medical history of ASTHMA, Asthma, Carpal tunnel syndrome, Diabetes (HCC), Hyperlipidemia, Morbid obesity (HCC), PCOS (polycystic ovarian syndrome), and Vitamin D deficiency.    SURGICAL HISTORY   has a past surgical history that includes eye surgery; open reduction; carpal tunnel release (Bilateral); and orif, ankle (Right, 2010).    FAMILY HISTORY  Family History   Problem Relation Age of Onset    Cancer Mother         throat    Alcohol/Drug Mother     Cancer Maternal Uncle     Diabetes Maternal Grandmother     Hypertension Maternal Grandmother     Heart Disease Neg Hx     Stroke Neg Hx        SOCIAL HISTORY  Social History     Tobacco Use    Smoking status: Former     Current packs/day: 0.00     Average packs/day: 0.3 packs/day for 15.0 years (3.8 ttl pk-yrs)     Types: Cigarettes     Start date: 2/15/2006     Quit date: 2/15/2021     Years since quitting: 3.7    Smokeless tobacco: Never   Vaping Use    Vaping status: Every Day    Substances: Nicotine, Flavoring    Devices: Disposable   Substance and Sexual Activity    Alcohol use: Yes     Comment: occ.    Drug use: Not Currently     Types: Methamphetamines, Inhaled     Comment: history of meth from [id number], no IV, one use a couple months ago     Sexual activity: Yes     Partners: Male     Birth control/protection: Condom       CURRENT MEDICATIONS  Home Medications       Reviewed by Chantell Gage R.N. (Registered Nurse) on 11/14/24 at 1107  Med List Status: Partial      Medication Last Dose Status   acetaminophen (TYLENOL) 500 MG Tab  Active   albuterol (PROVENTIL HFA) 108 (90 Base) MCG/ACT Aero Soln inhalation aerosol  Active   atorvastatin (LIPITOR) 10 MG Tab  Active   Blood Glucose Monitoring Suppl (BLOOD GLUCOSE MONITOR SYSTEM) w/Device Kit  Active   citalopram (CELEXA) 20 MG Tab  Active   dicyclomine (BENTYL) 20 MG Tab  Active   famotidine (PEPCID) 20 MG Tab  Active   fenofibrate micronized (LOFIBRA) 67 MG capsule  Active   glucose blood strip  Active   hydrocortisone 2.5 % Cream topical cream  Active   ibuprofen (MOTRIN) 600 MG Tab  Active   Insulin Pen Needle 32 G x 4 mm  Active   Lancets  Active   lidocaine (LIDODERM) 5 % Patch  Active   liraglutide (VICTOZA) 18 MG/3ML Solution Pen-injector  Active   loperamide (IMODIUM) 2 MG Cap  Active   metFORMIN ER (GLUCOPHAGE XR) 500 MG TABLET SR 24 HR  Active   norelgestromin-ethinyl estradiol (ORTHO EVRA) 150-35 MCG/24HR patch  Active   ondansetron (ZOFRAN ODT) 4 MG TABLET DISPERSIBLE  Active   prenatal vitamin with Fe/FA (SANDOR PRENATAL) 28-0.8 MG Tab  Active                    ALLERGIES  Allergies   Allergen Reactions    Abilify Unspecified     Pt states that the medication affects her mobility, room starts spinning, unable to walk or use hands or feet, black outs    Norco [Apap-Fd&C Blue #1-Hydrocodone] Vomiting    Norco [Hydrocodone-Acetaminophen] Vomiting    Prazosin        PHYSICAL EXAM  VITAL SIGNS: /75   Pulse (!) 107   Temp 36.3 °C (97.4 °F) (Temporal)   Resp 20   Wt 97.3 kg (214 lb 8.1 oz)   SpO2 95%   BMI 40.53 kg/m²      Constitutional: Alert.  HENT: No signs of trauma, Bilateral external ears normal, Nose normal.   Eyes: Pupils are equal and reactive, Conjunctiva normal, Non-icteric.   Neck: Normal range of motion, No tenderness, Supple, No stridor.   Lymphatic: No lymphadenopathy noted.   Cardiovascular: Regular rate and rhythm, no murmurs.   Thorax & Lungs: Normal breath sounds, No respiratory  distress, No wheezing, No chest tenderness.   Abdomen: Bowel sounds normal, Soft, No tenderness, No peritoneal signs, No masses, No pulsatile masses.   Skin: Warm, Dry, No erythema, No rash.   Back: No bony tenderness, No CVA tenderness.   Extremities: Intact distal pulses, No edema, No tenderness, No cyanosis.  Musculoskeletal: Good range of motion in all major joints. No tenderness to palpation or major deformities noted.   Neurologic: Alert , Normal motor function, Normal sensory function, No focal deficits noted.   Psychiatric: Affect normal, Judgment normal, Mood normal.       EKG/LABS    Labs Reviewed   URINALYSIS,CULTURE IF INDICATED - Abnormal; Notable for the following components:       Result Value    Character Cloudy (*)     Glucose 250 (*)     Ketones 15 (*)     Protein 30 (*)     Bilirubin Small (*)     Leukocyte Esterase Moderate (*)     Occult Blood Moderate (*)     All other components within normal limits   URINE MICROSCOPIC (W/UA) - Abnormal; Notable for the following components:    WBC 11-20 (*)     RBC 6-10 (*)     Bacteria Moderate (*)     Amorphous Crystal Present (*)     Trichomonas Present (*)     All other components within normal limits   URINE CULTURE(NEW)               COURSE & MEDICAL DECISION MAKING    ASSESSMENT, COURSE AND PLAN  Care Narrative:     Patient presents with urinary urgency and frequency.  UA was ordered.    1:27 PM UA is consistent with urinary tract infection.  The patient will be treated with Keflex.  She will be asked to return for worsening symptoms.  Urinary culture is sent and pending.                ADDITIONAL PROBLEMS MANAGED  Acute UTI    DISPOSITION AND DISCUSSIONS  I have discussed management of the patient with the following physicians and ISIS's: None    Discussion of management with other [facility] or appropriate source(s): None     Escalation of care considered, and ultimately not performed:Laboratory analysis and diagnostic imaging    Barriers to care at this  time, including but not limited to:  None .     Decision tools and prescription drugs considered including, but not limited to:  Prescribed the patient Keflex for UTI and wrote her a work note to be off until tomorrow .    The patient will return for new or worsening symptoms and is stable at the time of discharge.    The patient is referred to a primary physician for blood pressure management, diabetic screening, and for all other preventative health concerns.        DISPOSITION:  Patient will be discharged home in stable condition.    FOLLOW UP:  [facility], Emergency Dept  [address] [place] [id number] [phone]    If symptoms worsen    Nabila Mitchell M.D.  [address] [id number] [phone]            OUTPATIENT MEDICATIONS:  New Prescriptions    CEPHALEXIN (KEFLEX) 500 MG CAP    Take 1 Capsule by mouth 4 times a day for 7 days.           FINAL DIAGNOSIS  1. Acute cystitis with hematuria         Electronically signed by: Jason Peterson M.D., 11/14/2024 1:26 PM

## 2024-11-16 LAB
BACTERIA UR CULT: ABNORMAL
BACTERIA UR CULT: ABNORMAL
SIGNIFICANT IND 70042: ABNORMAL
SITE SITE: ABNORMAL
SOURCE SOURCE: ABNORMAL

## 2024-11-16 NOTE — ED NOTES
ED Positive Culture Follow-up/Notification Note:    Date: 11/16/2024     Patient seen in the ED on 11/14/2024 for urinary frequency, urgency, and painful urination.  Patient denies any nausea, vomiting, or back pain.     1. Acute cystitis with hematuria       Discharge Medication List as of 11/14/2024  2:02 PM        START taking these medications    Details   cephALEXin (KEFLEX) 500 MG Cap Take 2 Capsules by mouth in the morning, at noon, and at bedtime for 7 days., Disp-42 Capsule, R-0, Normal      !! ibuprofen (MOTRIN) 800 MG Tab Take 1 Tablet by mouth every 8 hours as needed (pain)., Disp-15 Tablet, R-0, Normal       !! - Potential duplicate medications found. Please discuss with provider.          Allergies: Abilify, Norco [apap-fd&c blue #1-hydrocodone], Norco [hydrocodone-acetaminophen], and Prazosin     Vitals:    11/14/24 1100 11/14/24 1105 11/14/24 1351   BP: 113/75  109/79   Pulse: (!) 107  83   Resp: 20  18   Temp: 36.3 °C (97.4 °F)  36.3 °C (97.4 °F)   TempSrc: Temporal     SpO2: 95%  94%   Weight:  97.3 kg (214 lb 8.1 oz)        Final cultures:   Results       Procedure Component Value Units Date/Time    URINE CULTURE(NEW) [[id number]]  (Abnormal) Collected: 11/14/24 1217    Order Status: Completed Specimen: Urine Updated: 11/16/24 1036     Significant Indicator POS     Source UR     Site -     Culture Result Usual urogenital rain >100,000 cfu/mL      Streptococcus agalactiae (Group B)  <10,000 cfu/mL      Urinalysis, Culture if Indicated [[id number]]  (Abnormal) Collected: 11/14/24 1217    Order Status: Completed Specimen: Urine, Clean Catch Updated: 11/14/24 1314     Color Dark Yellow     Character Cloudy     Specific Gravity 1.026     Ph 5.5     Glucose 250 mg/dL      Ketones 15 mg/dL      Protein 30 mg/dL      Bilirubin Small     Urobilinogen, Urine 1.0 EU/dL      Nitrite Negative     Leukocyte Esterase Moderate     Occult Blood Moderate     Micro Urine Req Microscopic            Plan:   Urine  culture is positive for streptococcus agalactiae.  Appropriate antibiotic therapy prescribed. No changes required based upon culture result.  Sent letter to patient to notify of positive culture result and encourage compliance with prescribed antibiotics.     Adrien Garrett, PharmD

## 2024-11-30 ENCOUNTER — HOSPITAL ENCOUNTER (EMERGENCY)
Facility: MEDICAL CENTER | Age: 29
End: 2024-11-30
Attending: EMERGENCY MEDICINE
Payer: COMMERCIAL

## 2024-11-30 VITALS
DIASTOLIC BLOOD PRESSURE: 74 MMHG | RESPIRATION RATE: 18 BRPM | HEART RATE: 86 BPM | HEIGHT: 62 IN | OXYGEN SATURATION: 95 % | SYSTOLIC BLOOD PRESSURE: 111 MMHG | BODY MASS INDEX: 38.78 KG/M2 | WEIGHT: 210.76 LBS | TEMPERATURE: 96.2 F

## 2024-11-30 DIAGNOSIS — R11.10 VOMITING AND DIARRHEA: ICD-10-CM

## 2024-11-30 DIAGNOSIS — R19.7 VOMITING AND DIARRHEA: ICD-10-CM

## 2024-11-30 DIAGNOSIS — R74.8 ABNORMAL LIVER ENZYMES: ICD-10-CM

## 2024-11-30 DIAGNOSIS — R10.30 LOWER ABDOMINAL PAIN: ICD-10-CM

## 2024-11-30 LAB
ALBUMIN SERPL BCP-MCNC: 4.1 G/DL (ref 3.2–4.9)
ALBUMIN/GLOB SERPL: 1.3 G/DL
ALP SERPL-CCNC: 53 U/L (ref 30–99)
ALT SERPL-CCNC: 66 U/L (ref 2–50)
ANION GAP SERPL CALC-SCNC: 13 MMOL/L (ref 7–16)
APPEARANCE UR: CLEAR
AST SERPL-CCNC: 89 U/L (ref 12–45)
BASOPHILS # BLD AUTO: 0.4 % (ref 0–1.8)
BASOPHILS # BLD: 0.03 K/UL (ref 0–0.12)
BILIRUB SERPL-MCNC: 0.5 MG/DL (ref 0.1–1.5)
BILIRUB UR QL STRIP.AUTO: NEGATIVE
BUN SERPL-MCNC: 11 MG/DL (ref 8–22)
CALCIUM ALBUM COR SERPL-MCNC: 9.1 MG/DL (ref 8.5–10.5)
CALCIUM SERPL-MCNC: 9.2 MG/DL (ref 8.5–10.5)
CHLORIDE SERPL-SCNC: 105 MMOL/L (ref 96–112)
CO2 SERPL-SCNC: 20 MMOL/L (ref 20–33)
COLOR UR: YELLOW
CREAT SERPL-MCNC: 0.65 MG/DL (ref 0.5–1.4)
EOSINOPHIL # BLD AUTO: 0.07 K/UL (ref 0–0.51)
EOSINOPHIL NFR BLD: 1 % (ref 0–6.9)
ERYTHROCYTE [DISTWIDTH] IN BLOOD BY AUTOMATED COUNT: 40.2 FL (ref 35.9–50)
FLUAV RNA SPEC QL NAA+PROBE: NEGATIVE
FLUBV RNA SPEC QL NAA+PROBE: NEGATIVE
GFR SERPLBLD CREATININE-BSD FMLA CKD-EPI: 120 ML/MIN/1.73 M 2
GLOBULIN SER CALC-MCNC: 3.1 G/DL (ref 1.9–3.5)
GLUCOSE BLD STRIP.AUTO-MCNC: 156 MG/DL (ref 65–99)
GLUCOSE SERPL-MCNC: 152 MG/DL (ref 65–99)
GLUCOSE UR STRIP.AUTO-MCNC: >=1000 MG/DL
HCG SERPL QL: NEGATIVE
HCT VFR BLD AUTO: 41.5 % (ref 37–47)
HGB BLD-MCNC: 14.8 G/DL (ref 12–16)
IMM GRANULOCYTES # BLD AUTO: 0.04 K/UL (ref 0–0.11)
IMM GRANULOCYTES NFR BLD AUTO: 0.6 % (ref 0–0.9)
KETONES UR STRIP.AUTO-MCNC: 40 MG/DL
LEUKOCYTE ESTERASE UR QL STRIP.AUTO: NEGATIVE
LIPASE SERPL-CCNC: 37 U/L (ref 11–82)
LYMPHOCYTES # BLD AUTO: 0.77 K/UL (ref 1–4.8)
LYMPHOCYTES NFR BLD: 11.4 % (ref 22–41)
MCH RBC QN AUTO: 30.5 PG (ref 27–33)
MCHC RBC AUTO-ENTMCNC: 35.7 G/DL (ref 32.2–35.5)
MCV RBC AUTO: 85.6 FL (ref 81.4–97.8)
MICRO URNS: ABNORMAL
MONOCYTES # BLD AUTO: 0.29 K/UL (ref 0–0.85)
MONOCYTES NFR BLD AUTO: 4.3 % (ref 0–13.4)
NEUTROPHILS # BLD AUTO: 5.58 K/UL (ref 1.82–7.42)
NEUTROPHILS NFR BLD: 82.3 % (ref 44–72)
NITRITE UR QL STRIP.AUTO: NEGATIVE
NRBC # BLD AUTO: 0 K/UL
NRBC BLD-RTO: 0 /100 WBC (ref 0–0.2)
PH UR STRIP.AUTO: 6 [PH] (ref 5–8)
PLATELET # BLD AUTO: 214 K/UL (ref 164–446)
PLATELETS.RETICULATED NFR BLD AUTO: 3 % (ref 0.6–13.1)
PMV BLD AUTO: 9.9 FL (ref 9–12.9)
POTASSIUM SERPL-SCNC: 4 MMOL/L (ref 3.6–5.5)
PROT SERPL-MCNC: 7.2 G/DL (ref 6–8.2)
PROT UR QL STRIP: NEGATIVE MG/DL
RBC # BLD AUTO: 4.85 M/UL (ref 4.2–5.4)
RBC UR QL AUTO: NEGATIVE
RSV RNA SPEC QL NAA+PROBE: NEGATIVE
SARS-COV-2 RNA RESP QL NAA+PROBE: NOTDETECTED
SODIUM SERPL-SCNC: 138 MMOL/L (ref 135–145)
SP GR UR STRIP.AUTO: 1.04
UROBILINOGEN UR STRIP.AUTO-MCNC: 1 EU/DL
WBC # BLD AUTO: 6.8 K/UL (ref 4.8–10.8)

## 2024-11-30 PROCEDURE — 85055 RETICULATED PLATELET ASSAY: CPT

## 2024-11-30 PROCEDURE — 0241U HCHG SARS-COV-2 COVID-19 NFCT DS RESP RNA 4 TRGT ED POC: CPT

## 2024-11-30 PROCEDURE — 82962 GLUCOSE BLOOD TEST: CPT

## 2024-11-30 PROCEDURE — 81003 URINALYSIS AUTO W/O SCOPE: CPT

## 2024-11-30 PROCEDURE — 83690 ASSAY OF LIPASE: CPT

## 2024-11-30 PROCEDURE — 84703 CHORIONIC GONADOTROPIN ASSAY: CPT

## 2024-11-30 PROCEDURE — 700102 HCHG RX REV CODE 250 W/ 637 OVERRIDE(OP): Performed by: EMERGENCY MEDICINE

## 2024-11-30 PROCEDURE — 700111 HCHG RX REV CODE 636 W/ 250 OVERRIDE (IP): Mod: JZ | Performed by: EMERGENCY MEDICINE

## 2024-11-30 PROCEDURE — 85025 COMPLETE CBC W/AUTO DIFF WBC: CPT

## 2024-11-30 PROCEDURE — 80053 COMPREHEN METABOLIC PANEL: CPT

## 2024-11-30 PROCEDURE — 96374 THER/PROPH/DIAG INJ IV PUSH: CPT

## 2024-11-30 PROCEDURE — 99284 EMERGENCY DEPT VISIT MOD MDM: CPT

## 2024-11-30 PROCEDURE — A9270 NON-COVERED ITEM OR SERVICE: HCPCS | Performed by: EMERGENCY MEDICINE

## 2024-11-30 PROCEDURE — 36415 COLL VENOUS BLD VENIPUNCTURE: CPT

## 2024-11-30 RX ORDER — ONDANSETRON 4 MG/1
4 TABLET, ORALLY DISINTEGRATING ORAL ONCE
Status: COMPLETED | OUTPATIENT
Start: 2024-11-30 | End: 2024-11-30

## 2024-11-30 RX ORDER — ONDANSETRON 2 MG/ML
4 INJECTION INTRAMUSCULAR; INTRAVENOUS ONCE
Status: COMPLETED | OUTPATIENT
Start: 2024-11-30 | End: 2024-11-30

## 2024-11-30 RX ORDER — ONDANSETRON 4 MG/1
4 TABLET, ORALLY DISINTEGRATING ORAL EVERY 8 HOURS PRN
Qty: 10 TABLET | Refills: 1 | Status: SHIPPED | OUTPATIENT
Start: 2024-11-30

## 2024-11-30 RX ORDER — IBUPROFEN 600 MG/1
600 TABLET, FILM COATED ORAL ONCE
Status: COMPLETED | OUTPATIENT
Start: 2024-11-30 | End: 2024-11-30

## 2024-11-30 RX ADMIN — IBUPROFEN 600 MG: 600 TABLET, FILM COATED ORAL at 10:55

## 2024-11-30 RX ADMIN — ONDANSETRON 4 MG: 2 INJECTION INTRAMUSCULAR; INTRAVENOUS at 10:19

## 2024-11-30 RX ADMIN — ONDANSETRON 4 MG: 4 TABLET, ORALLY DISINTEGRATING ORAL at 09:34

## 2024-11-30 ASSESSMENT — FIBROSIS 4 INDEX: FIB4 SCORE: 0.33

## 2024-11-30 NOTE — DISCHARGE INSTRUCTIONS
Take Zofran as needed for nausea/vomiting    Clear liquids for the next 12-24 hours then advance to a brat diet which includes bananas, rice, applesauce, toast, etc.    Return to the ER for worsening headache or abdominal pain, blood in your vomit or stool, or other concerns    Follow-up with your primary care doctor for recheck next week.  Your liver enzymes were slightly high--this may be due to your diabetes.  Likely not due to hepatitis given the numbers but they should be rechecked and I have no prior values in the system for you.

## 2024-11-30 NOTE — ED TRIAGE NOTES
Chief Complaint   Patient presents with    N/V     Patient report sudden onset of N/V/D with headache starting at 3am. Patient reports she has not been feeling very well for the past week.      Patient placed in hallway for lab draw. COVID test collected. POC B

## 2024-11-30 NOTE — ED NOTES
Pt able to tolerate water for PO challenge. But states still nauseous but no episode of vomiting.erp made aware

## 2024-11-30 NOTE — ED NOTES
Bedside report received from ZARA De Jesus. Assumed care of patient. Pt assessed, AAO x 4. Medicated patient per MAR. Patient denies further needs. Call light within reach.

## 2024-11-30 NOTE — ED NOTES
Patient provided discharge instructions. Patient verbalized understanding. Patient leaving ER in stable condition. Patient ambulatory with steady gait. IV removed. Work excuse provided.

## 2024-11-30 NOTE — ED PROVIDER NOTES
ED Provider Note    CHIEF COMPLAINT  Chief Complaint   Patient presents with    N/V     Patient report sudden onset of N/V/D with headache starting at 3am. Patient reports she has not been feeling very well for the past week.        EXTERNAL RECORDS REVIEWED  Outpatient labs & studies I reviewed prior labs for AST and ALT values but none were available    HPI/ROS  LIMITATION TO HISTORY   Select: : None    OUTSIDE HISTORIAN(S):  None    Anisha Tristan is a 29 y.o. diabetic female with history of asthma, PCOS, and dyslipidemia who presents nausea, vomiting, diarrhea, and headache.    Patient reports these symptoms began overnight.  She has had multiple episodes of vomiting and diarrhea, neither with blood.  She reports lower abdominal cramping and nausea.  She also reports a headache that is present in the occipital areas and behind her eyes.    Patient denies fever, chills, sick contacts, chest pain, shortness of breath, neck stiffness, urinary symptoms.    PAST MEDICAL HISTORY   has a past medical history of ASTHMA, Asthma, Carpal tunnel syndrome, Diabetes (HCC), Hyperlipidemia, Morbid obesity (HCC), PCOS (polycystic ovarian syndrome), and Vitamin D deficiency.    SURGICAL HISTORY   has a past surgical history that includes eye surgery; open reduction; carpal tunnel release (Bilateral); and orif, ankle (Right, 2010).    FAMILY HISTORY  Family History   Problem Relation Age of Onset    Cancer Mother         throat    Alcohol/Drug Mother     Cancer Maternal Uncle     Diabetes Maternal Grandmother     Hypertension Maternal Grandmother     Heart Disease Neg Hx     Stroke Neg Hx        SOCIAL HISTORY  Social History     Tobacco Use    Smoking status: Former     Current packs/day: 0.00     Average packs/day: 0.3 packs/day for 15.0 years (3.8 ttl pk-yrs)     Types: Cigarettes     Start date: 2/15/2006     Quit date: 2/15/2021     Years since quitting: 3.7    Smokeless tobacco: Never   Vaping Use    Vaping status: Every  "Day    Substances: Nicotine, Flavoring    Devices: Disposable   Substance and Sexual Activity    Alcohol use: Yes     Comment: occ.    Drug use: Not Currently     Types: Methamphetamines, Inhaled     Comment: history of meth from 5817-8954, no IV, one use a couple months ago     Sexual activity: Yes     Partners: Male     Birth control/protection: Condom       CURRENT MEDICATIONS  Home Medications    **Home medications have not yet been reviewed for this encounter**         ALLERGIES  Allergies   Allergen Reactions    Abilify Unspecified     Pt states that the medication affects her mobility, room starts spinning, unable to walk or use hands or feet, black outs    Norco [Apap-Fd&C Blue #1-Hydrocodone] Vomiting    Norco [Hydrocodone-Acetaminophen] Vomiting    Prazosin        PHYSICAL EXAM  VITAL SIGNS: /74   Pulse 86   Temp (!) 35.7 °C (96.2 °F) (Temporal)   Resp 18   Ht 1.575 m (5' 2\")   Wt 95.6 kg (210 lb 12.2 oz)   SpO2 95%   BMI 38.55 kg/m²    General:  WD female with elevated BMI, nontoxic appearing in NAD; A+Ox3; V/S as above; afebrile  Skin: warm and dry; good color; no rash  HEENT: NCAT; EOMs intact; no scleral icterus   Neck: FROM, no stridor, soft, supple  Cardiovascular: Regular heart rate and rhythm.  No murmurs, rubs, or gallops  Lungs: No respiratory distress or tachypnea; Clear to auscultation with good air movement bilaterally.  No wheezes, rhonchi, or rales.   Abdomen: soft; NTND; no rebound, guarding, or rigidity.  No organomegaly or pulsatile mass  Extremities: OSORIO x 4  Neurologic: CNs III-XII grossly intact; speech clear  Psychiatric: Appropriate affect, normal mood      EKG/LABS  Results for orders placed or performed during the hospital encounter of 11/30/24   POCT glucose device results    Collection Time: 11/30/24  8:56 AM   Result Value Ref Range    POC Glucose, Blood 156 (H) 65 - 99 mg/dL   CBC WITH DIFFERENTIAL    Collection Time: 11/30/24  9:03 AM   Result Value Ref Range    " WBC 6.8 4.8 - 10.8 K/uL    RBC 4.85 4.20 - 5.40 M/uL    Hemoglobin 14.8 12.0 - 16.0 g/dL    Hematocrit 41.5 37.0 - 47.0 %    MCV 85.6 81.4 - 97.8 fL    MCH 30.5 27.0 - 33.0 pg    MCHC 35.7 (H) 32.2 - 35.5 g/dL    RDW 40.2 35.9 - 50.0 fL    Platelet Count 214 164 - 446 K/uL    MPV 9.9 9.0 - 12.9 fL    Neutrophils-Polys 82.30 (H) 44.00 - 72.00 %    Lymphocytes 11.40 (L) 22.00 - 41.00 %    Monocytes 4.30 0.00 - 13.40 %    Eosinophils 1.00 0.00 - 6.90 %    Basophils 0.40 0.00 - 1.80 %    Immature Granulocytes 0.60 0.00 - 0.90 %    Nucleated RBC 0.00 0.00 - 0.20 /100 WBC    Neutrophils (Absolute) 5.58 1.82 - 7.42 K/uL    Lymphs (Absolute) 0.77 (L) 1.00 - 4.80 K/uL    Monos (Absolute) 0.29 0.00 - 0.85 K/uL    Eos (Absolute) 0.07 0.00 - 0.51 K/uL    Baso (Absolute) 0.03 0.00 - 0.12 K/uL    Immature Granulocytes (abs) 0.04 0.00 - 0.11 K/uL    NRBC (Absolute) 0.00 K/uL   COMP METABOLIC PANEL    Collection Time: 11/30/24  9:03 AM   Result Value Ref Range    Sodium 138 135 - 145 mmol/L    Potassium 4.0 3.6 - 5.5 mmol/L    Chloride 105 96 - 112 mmol/L    Co2 20 20 - 33 mmol/L    Anion Gap 13.0 7.0 - 16.0    Glucose 152 (H) 65 - 99 mg/dL    Bun 11 8 - 22 mg/dL    Creatinine 0.65 0.50 - 1.40 mg/dL    Calcium 9.2 8.5 - 10.5 mg/dL    Correct Calcium 9.1 8.5 - 10.5 mg/dL    AST(SGOT) 89 (H) 12 - 45 U/L    ALT(SGPT) 66 (H) 2 - 50 U/L    Alkaline Phosphatase 53 30 - 99 U/L    Total Bilirubin 0.5 0.1 - 1.5 mg/dL    Albumin 4.1 3.2 - 4.9 g/dL    Total Protein 7.2 6.0 - 8.2 g/dL    Globulin 3.1 1.9 - 3.5 g/dL    A-G Ratio 1.3 g/dL   LIPASE    Collection Time: 11/30/24  9:03 AM   Result Value Ref Range    Lipase 37 11 - 82 U/L   HCG QUAL SERUM    Collection Time: 11/30/24  9:03 AM   Result Value Ref Range    Beta-Hcg Qualitative Serum Negative Negative   IMMATURE PLT FRACTION    Collection Time: 11/30/24  9:03 AM   Result Value Ref Range    Imm. Plt Fraction 3.0 0.6 - 13.1 %   ESTIMATED GFR    Collection Time: 11/30/24  9:03 AM   Result  Value Ref Range    GFR (CKD-EPI) 120 >60 mL/min/1.73 m 2   POC CoV-2, FLU A/B, RSV by PCR    Collection Time: 11/30/24  9:06 AM   Result Value Ref Range    POC Influenza A RNA, PCR Negative Negative    POC Influenza B RNA, PCR Negative Negative    POC RSV, by PCR Negative Negative    POC SARS-CoV-2, PCR NotDetected NotDetected   URINALYSIS    Collection Time: 11/30/24  9:51 AM    Specimen: Urine   Result Value Ref Range    Color Yellow     Character Clear     Specific Gravity 1.042 <1.035    Ph 6.0 5.0 - 8.0    Glucose >=1000 (A) Negative mg/dL    Ketones 40 (A) Negative mg/dL    Protein Negative Negative mg/dL    Bilirubin Negative Negative    Urobilinogen, Urine 1.0 <=1.0 EU/dL    Nitrite Negative Negative    Leukocyte Esterase Negative Negative    Occult Blood Negative Negative    Micro Urine Req see below        RADIOLOGY/PROCEDURES   none      COURSE & MEDICAL DECISION MAKING    ASSESSMENT, COURSE AND PLAN  Care Narrative: This is a 29-year-old diabetic with history of dyslipidemia, asthma, PCOS who presents to the ER with nausea, vomiting, diarrhea and headache.  Patient is afebrile and nontoxic-appearing.  She has a soft, nontender, nonsurgical abdomen.  I suspect a viral illness.  I do not suspect meningitis, appendicitis or other surgical emergency, UTI, PID, renal colic.  I do not feel that imaging is indicated.  Abdominal pain protocol labs were ordered at triage.    Zofran ordered.    Patient tolerated her p.o. challenge but still feels nauseous.  Zofran repeated.  IV fluids are not indicated given no significant hyperglycemia and tolerating p.o.    Labs show a normal white blood cell count, lymphopenia which may indicate viral illness, glucose 152, AST 89, ALT 66, negative hCG, normal lipase, negative viral swabs, and no evidence of UTI.    10:28 AM  Patient reevaluated.  Patient feels improved after Zofran.  Patient was given return precautions for abdominal pain.  Prescription for Zofran provided.   Encouraged to do clear liquids and then advance to BRAT diet.  Ibuprofen ordered for headache.         DISPOSITION AND DISCUSSIONS  Escalation of care considered, and ultimately not performed:IV fluids and diagnostic imaging      FINAL DIAGNOSIS  1. Vomiting and diarrhea    2. Lower abdominal pain    3. Abnormal liver enzymes         Electronically signed by: Savannah Claudio M.D., 11/30/2024 9:26 AM

## 2025-02-12 ENCOUNTER — APPOINTMENT (OUTPATIENT)
Dept: RADIOLOGY | Facility: MEDICAL CENTER | Age: 30
End: 2025-02-12
Attending: EMERGENCY MEDICINE
Payer: COMMERCIAL

## 2025-02-12 ENCOUNTER — HOSPITAL ENCOUNTER (EMERGENCY)
Facility: MEDICAL CENTER | Age: 30
End: 2025-02-12
Attending: EMERGENCY MEDICINE
Payer: COMMERCIAL

## 2025-02-12 VITALS
TEMPERATURE: 97 F | SYSTOLIC BLOOD PRESSURE: 120 MMHG | DIASTOLIC BLOOD PRESSURE: 71 MMHG | BODY MASS INDEX: 40.57 KG/M2 | HEART RATE: 87 BPM | WEIGHT: 220.46 LBS | HEIGHT: 62 IN | RESPIRATION RATE: 15 BRPM | OXYGEN SATURATION: 94 %

## 2025-02-12 DIAGNOSIS — S46.912A STRAIN OF LEFT SHOULDER, INITIAL ENCOUNTER: ICD-10-CM

## 2025-02-12 DIAGNOSIS — M25.512 LEFT SHOULDER PAIN, UNSPECIFIED CHRONICITY: ICD-10-CM

## 2025-02-12 PROCEDURE — 99283 EMERGENCY DEPT VISIT LOW MDM: CPT

## 2025-02-12 PROCEDURE — 73030 X-RAY EXAM OF SHOULDER: CPT | Mod: LT

## 2025-02-12 ASSESSMENT — FIBROSIS 4 INDEX: FIB4 SCORE: 1.59

## 2025-02-12 NOTE — ED NOTES
Sling applied to patients left arm without incident. CMS intact before and after sling application.

## 2025-02-12 NOTE — ED TRIAGE NOTES
"Chief Complaint   Patient presents with    Shoulder Pain     Unable to move left arm x3 days   Denies any injury or trauma        Pt presented to Ed for the above mentioned complaints. Pt sated she started feeling left shoulder pain since 3 days, pt rubbed it and think has made it worst. Pt unable to raise let arm. Pt denies any trauma or injury to her left shoulder. Pt left arm CMS intact. Able to move fingers.     /79   Pulse 92   Temp 36.3 °C (97.4 °F) (Temporal)   Resp 16   Ht 1.575 m (5' 2\")   Wt 100 kg (220 lb 7.4 oz)   SpO2 97%   BMI 40.32 kg/m²     "

## 2025-02-12 NOTE — ED PROVIDER NOTES
ED Provider Note    CHIEF COMPLAINT  Chief Complaint   Patient presents with    Shoulder Pain     Unable to move left arm x3 days   Denies any injury or trauma          EXTERNAL RECORDS REVIEWED  Patient's last encounter was an ED visit November 30 of this year she was seen for nausea vomiting, diarrhea, lower abdominal pain and abnormal liver enzymes.    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    ED visit August 2024 for nasal congestion, diagnosed with maxillary sinusitis.  ED visit March 2024 for cough and nasal congestion.    HPI/ROS  LIMITATION TO HISTORY   Select: : None  OUTSIDE HISTORIAN(S):  None    Anisha Tristan is a 29 y.o. female who presents to the emergency department with a chief complaint of left shoulder pain.  Patient states that on Sunday, she noticed symptoms and they have been persistent.  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  She thought perhaps she slept on it wrong.  She denies any falls or trauma.  No numbness or tingling.  No fever or systemic symptoms.  No overlying skin changes.  No prior similar symptoms.  She has a history of diabetes for which she takes Synjardy and Trulicity.  She is allergic to prazosin and Abilify.    PAST MEDICAL HISTORY   has a past medical history of ASTHMA, Asthma, Carpal tunnel syndrome, Diabetes (HCC), Hyperlipidemia, Morbid obesity (HCC), PCOS (polycystic ovarian syndrome), and Vitamin D deficiency.    SURGICAL HISTORY   has a past surgical history that includes eye surgery; open reduction; carpal tunnel release (Bilateral); and orif, ankle (Right, 2010).    FAMILY HISTORY  Family History   Problem Relation Age of Onset    Cancer Mother         throat    Alcohol/Drug Mother     Cancer Maternal Uncle     Diabetes Maternal Grandmother     Hypertension Maternal Grandmother     Heart Disease Neg Hx     Stroke Neg Hx        SOCIAL HISTORY  Social History     Tobacco Use    Smoking status: Former     Current  packs/day: 0.00     Average packs/day: 0.3 packs/day for 15.0 years (3.8 ttl pk-yrs)     Types: Cigarettes     Start date: 2/15/2006     Quit date: 2/15/2021     Years since quitting: 3.9    Smokeless tobacco: Never   Vaping Use    Vaping status: Every Day    Substances: Nicotine, Flavoring    Devices: Disposable   Substance and Sexual Activity    Alcohol use: Yes     Comment: occ.    Drug use: Not Currently     Types: Methamphetamines, Inhaled     Comment: history of meth from 0790-1961, no IV, one use a couple months ago     Sexual activity: Yes     Partners: Male     Birth control/protection: Condom       CURRENT MEDICATIONS  Home Medications       Reviewed by Fadia Swartz R.N. (Registered Nurse) on 02/12/25 at 1219  Med List Status: Not Addressed     Medication Last Dose Status   acetaminophen (TYLENOL) 500 MG Tab  Active   albuterol (PROVENTIL HFA) 108 (90 Base) MCG/ACT Aero Soln inhalation aerosol  Active   atorvastatin (LIPITOR) 10 MG Tab  Active   Blood Glucose Monitoring Suppl (BLOOD GLUCOSE MONITOR SYSTEM) w/Device Kit  Active   citalopram (CELEXA) 20 MG Tab  Active   dicyclomine (BENTYL) 20 MG Tab  Active   famotidine (PEPCID) 20 MG Tab  Active   fenofibrate micronized (LOFIBRA) 67 MG capsule  Active   glucose blood strip  Active   hydrocortisone 2.5 % Cream topical cream  Active   ibuprofen (MOTRIN) 600 MG Tab  Active   ibuprofen (MOTRIN) 800 MG Tab  Active   Insulin Pen Needle 32 G x 4 mm  Active   Lancets  Active   lidocaine (LIDODERM) 5 % Patch  Active   liraglutide (VICTOZA) 18 MG/3ML Solution Pen-injector  Active   loperamide (IMODIUM) 2 MG Cap  Active   metFORMIN ER (GLUCOPHAGE XR) 500 MG TABLET SR 24 HR  Active   norelgestromin-ethinyl estradiol (ORTHO EVRA) 150-35 MCG/24HR patch  Active   ondansetron (ZOFRAN ODT) 4 MG TABLET DISPERSIBLE  Active   ondansetron (ZOFRAN ODT) 4 MG TABLET DISPERSIBLE  Active   prenatal vitamin with Fe/FA (SANDOR PRENATAL) 28-0.8 MG Tab  Active               "      ALLERGIES  Allergies   Allergen Reactions    Abilify Unspecified     Pt states that the medication affects her mobility, room starts spinning, unable to walk or use hands or feet, black outs    Norco [Apap-Fd&C Blue #1-Hydrocodone] Vomiting    Norco [Hydrocodone-Acetaminophen] Vomiting    Prazosin        PHYSICAL EXAM  VITAL SIGNS: /71   Pulse 87   Temp 36.1 °C (97 °F) (Temporal)   Resp 15   Ht 1.575 m (5' 2\")   Wt 100 kg (220 lb 7.4 oz)   SpO2 94%   BMI 40.32 kg/m²    Vitals reviewed.  Constitutional: Patient is oriented to person, place, and time. Appears well-developed and well-nourished. Mild distress.    Head: Normocephalic and atraumatic.   Mouth/Throat: Oropharynx is clear   Eyes: Conjunctivae are normal. Pupils are equal, round, and reactive to light.   Neck: Normal range of motion. Neck supple.   Cardiovascular: Normal rate, regular rhythm and normal heart sounds. Normal peripheral pulses, bilateral upper extremity.  Pulmonary/Chest: Effort normal and breath sounds normal. No respiratory distress, no wheezes, rhonchi, or rales. No chest wall tenderness.  Abdominal: Soft. Bowel sounds are normal. There is no tenderness  Musculoskeletal: No edema.  There is no asymmetry in appearance of the shoulder was when compared to contralateral side.  No tenderness along the clavicles.  No increased warmth or skin overlying skin changes to the upper chest or upper extremities.  Normal range of motion of the left wrist.  Mild pain with ROM of the left elbow.  There is limited range of motion of the left shoulder in abduction.  Neurological: Normal motor and sensory exam. No focal deficits.   Skin: Skin is warm and dry. No erythema. No pallor.   Psychiatric: Patient has a normal mood and affect.     EKG/LABS    RADIOLOGY/PROCEDURES   I have independently interpreted the diagnostic imaging associated with this visit and am waiting the final reading from the radiologist.   My preliminary interpretation is " as follows: No fracture or dislocation. No air in the tissues    Radiologist interpretation:  DX-SHOULDER 2+ LEFT   Final Result      1.  Normal shoulder series.          COURSE & MEDICAL DECISION MAKING    ASSESSMENT, COURSE AND PLAN  Care Narrative:     This is a pleasant 29-year-old female.  History of diabetes.  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  She reports that on Sunday, she started having pain in her left shoulder.  No known trauma.  She suspects that perhaps she slept on it wrong.  She has no systemic symptoms.  No fever.  No swelling.  No increased warmth to the area.  We discussed the possibility of repetitive use injury versus ligamentous injury.  At this time, given timeframe, exam, I doubt septic arthritis or infectious etiology.  She is given a work note to indicate light duty.  She is also advised, if her symptoms are not improving or if she develops systemic symptoms as we discussed, that she should return for reevaluation.  Additionally, I have advised that she wear her splint at all times for at least the next week.  She is agreeable to this plan of care.  She is discharged home in stable condition.    ADDITIONAL PROBLEMS MANAGED      DISPOSITION AND DISCUSSIONS  I have discussed management of the patient with the following physicians and ISIS's: None    Discussion of management with other QHP or appropriate source(s):  None     Escalation of care considered, and ultimately not performed: None    Barriers to care at this time, including but not limited to:  None.     Decision tools and prescription drugs considered including, but not limited to:  None.    FINAL DIAGNOSIS  1. Strain of left shoulder, initial encounter    2. Left shoulder pain, unspecified chronicity         Electronically signed by: Cassie Arvizu D.O., 2/12/2025 1:15 PM

## 2025-02-12 NOTE — DISCHARGE INSTRUCTIONS
I suspect, your pain is due to repetitive use.  I advise you to stay in your sling at all times.  Limited lifting bending.  It is important that you follow-up with the occupational health clinic associated with your workplace.  You can take Tylenol or ibuprofen for pain and ice the area.  If it is not improved in the next several days, I would also recommend that she follow-up with orthopedics.  At this time, I do not have suspicion for infection in the joint however, if you develop fever or other concerning symptoms which we discussed, please return for reevaluation.

## 2025-02-12 NOTE — Clinical Note
Anisha Tristan was seen and treated in our emergency department on 2/12/2025.  She may return to work on 02/13/2025.  Patient was seen and evaluated in the emergency department on this date.     If you have any questions or concerns, please don't hesitate to call.      Cassie Arvizu D.O.

## 2025-02-12 NOTE — ED NOTES
Pt understand DC instructions and follow-up, DC paperwork provided, pt ambulated with steady gait to [facility] for DC

## 2025-03-13 ENCOUNTER — APPOINTMENT (OUTPATIENT)
Dept: RADIOLOGY | Facility: MEDICAL CENTER | Age: 30
End: 2025-03-13
Attending: STUDENT IN AN ORGANIZED HEALTH CARE EDUCATION/TRAINING PROGRAM
Payer: COMMERCIAL

## 2025-03-13 ENCOUNTER — HOSPITAL ENCOUNTER (EMERGENCY)
Facility: MEDICAL CENTER | Age: 30
End: 2025-03-13
Attending: STUDENT IN AN ORGANIZED HEALTH CARE EDUCATION/TRAINING PROGRAM
Payer: COMMERCIAL

## 2025-03-13 VITALS
RESPIRATION RATE: 19 BRPM | HEIGHT: 62 IN | TEMPERATURE: 97.3 F | HEART RATE: 98 BPM | WEIGHT: 220.46 LBS | BODY MASS INDEX: 40.57 KG/M2 | DIASTOLIC BLOOD PRESSURE: 75 MMHG | SYSTOLIC BLOOD PRESSURE: 122 MMHG | OXYGEN SATURATION: 98 %
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
## 2025-03-13 DIAGNOSIS — S63.501A SPRAIN OF RIGHT WRIST, INITIAL ENCOUNTER: ICD-10-CM

## 2025-03-13 DIAGNOSIS — S93.401A SPRAIN OF RIGHT ANKLE, UNSPECIFIED LIGAMENT, INITIAL ENCOUNTER: ICD-10-CM

## 2025-03-13 PROCEDURE — 73610 X-RAY EXAM OF ANKLE: CPT | Mod: RT

## 2025-03-13 PROCEDURE — 99284 EMERGENCY DEPT VISIT MOD MDM: CPT

## 2025-03-13 PROCEDURE — 73110 X-RAY EXAM OF WRIST: CPT | Mod: RT

## 2025-03-13 ASSESSMENT — FIBROSIS 4 INDEX: FIB4 SCORE: 1.59

## 2025-03-13 NOTE — Clinical Note
Anisha Tristan was seen and treated in our emergency department on 3/13/2025.  She may return to work on 03/17/2025.       If you have any questions or concerns, please don't hesitate to call.      Elias Tavarez

## 2025-03-14 NOTE — ED PROVIDER NOTES
CHIEF COMPLAINT  Chief Complaint   Patient presents with    GLF     Pt reports she stepped on a cat and fell down the stairs. States feet flew out from under her and fell onto her bottom and back. Pt reports pain to R ankle and R wrist. CMS intact       LIMITATION TO HISTORY   Select:     HPI    Anisha Tristan Patient is a female who presents after a fall down two stairs, tripping over a neighborhood stray cat. She reports pain in her right ankle and right wrist. She has a history of right ankle surgery with a pin and screw placement due to a prior talus fracture. No reported head trauma, loss of consciousness, or other significant injuries. She notes some mild mid-back pain but is primarily concerned about her wrist and ankle. She is able to ambulate without difficulty.    OUTSIDE HISTORIAN(S):  Select:    EXTERNAL RECORDS REVIEWED  Select:       PAST MEDICAL HISTORY  Past Medical History:   Diagnosis Date    ASTHMA     Asthma     Carpal tunnel syndrome     Diabetes (HCC)     Hyperlipidemia     Morbid obesity (HCC)     PCOS (polycystic ovarian syndrome)     Vitamin D deficiency      .    SURGICAL HISTORY  Past Surgical History:   Procedure Laterality Date    ORIF, ANKLE Right 2010    CARPAL TUNNEL RELEASE Bilateral     EYE SURGERY      OPEN REDUCTION           FAMILY HISTORY  Family History   Problem Relation Age of Onset    Cancer Mother         throat    Alcohol/Drug Mother     Cancer Maternal Uncle     Diabetes Maternal Grandmother     Hypertension Maternal Grandmother     Heart Disease Neg Hx     Stroke Neg Hx           SOCIAL HISTORY  Social History     Socioeconomic History    Marital status: Legally      Spouse name: Not on file    Number of children: Not on file    Years of education: Not on file    Highest education level: Not on file   Occupational History    Not on file   Tobacco Use    Smoking status: Former     Current packs/day: 0.00     Average packs/day: 0.3 packs/day for 15.0 years  (3.8 ttl pk-yrs)     Types: Cigarettes     Start date: 2/15/2006     Quit date: 2/15/2021     Years since quittin.0    Smokeless tobacco: Never   Vaping Use    Vaping status: Every Day    Substances: Nicotine, Flavoring    Devices: Disposable   Substance and Sexual Activity    Alcohol use: Yes     Comment: occ.    Drug use: Not Currently     Types: Methamphetamines, Inhaled     Comment: history of meth from 1501-1701, no IV, one use a couple months ago     Sexual activity: Yes     Partners: Male     Birth control/protection: Condom   Other Topics Concern    Not on file   Social History Narrative    ** Merged History Encounter **          Social Drivers of Health     Financial Resource Strain: Not on file   Food Insecurity: Not on file   Transportation Needs: Not on file   Physical Activity: Not on file   Stress: Not on file   Social Connections: Not on file   Intimate Partner Violence: Not on file   Housing Stability: Not on file         CURRENT MEDICATIONS  No current facility-administered medications on file prior to encounter.     Current Outpatient Medications on File Prior to Encounter   Medication Sig Dispense Refill    ondansetron (ZOFRAN ODT) 4 MG TABLET DISPERSIBLE Take 1 Tablet by mouth every 8 hours as needed for Nausea/Vomiting. 10 Tablet 1    ibuprofen (MOTRIN) 800 MG Tab Take 1 Tablet by mouth every 8 hours as needed (pain). 15 Tablet 0    liraglutide (VICTOZA) 18 MG/3ML Solution Pen-injector Inject 1.2 mg under the skin every day. 6 mL 5    norelgestromin-ethinyl estradiol (ORTHO EVRA) 150-35 MCG/24HR patch Place 1 Patch on the skin every 7 days. 9 Patch 3    ondansetron (ZOFRAN ODT) 4 MG TABLET DISPERSIBLE Take 1 Tablet by mouth every 6 hours as needed for Nausea/Vomiting. 30 Tablet 1    metFORMIN ER (GLUCOPHAGE XR) 500 MG TABLET SR 24 HR Take 1 tablet by mouth once daily 30 Tablet 5    famotidine (PEPCID) 20 MG Tab Take 1 tablet by mouth twice daily 60 Tablet 5    prenatal vitamin with Fe/FA  (SANDOR PRENATAL) 28-0.8 MG Tab Take 1 tablet by mouth once daily 30 Tablet 5    Lancets Use one to check glucoses daily morning before breakfast 100 Each 5    citalopram (CELEXA) 20 MG Tab Take 1.5 Tablets by mouth every day. 45 Tablet 5    lidocaine (LIDODERM) 5 % Patch Place 1 Patch on the skin every 24 hours. 10 Patch 0    acetaminophen (TYLENOL) 500 MG Tab Take 1-2 Tablets by mouth every 6 hours as needed for Mild Pain or Moderate Pain. 30 Tablet 0    ibuprofen (MOTRIN) 600 MG Tab Take 1 Tablet by mouth every 6 hours as needed for Mild Pain or Moderate Pain. 30 Tablet 0    Insulin Pen Needle 32 G x 4 mm Use 1 Each every day. 100 Each 2    Blood Glucose Monitoring Suppl (BLOOD GLUCOSE MONITOR SYSTEM) w/Device Kit Test blood sugar as recommended by provider. 1 Kit 0    glucose blood strip Test glucose daily in the morning fasting 100 Strip 5    atorvastatin (LIPITOR) 10 MG Tab Take 1 Tablet by mouth every evening. 30 Tablet 5    fenofibrate micronized (LOFIBRA) 67 MG capsule Take 1 Capsule by mouth every morning before breakfast. 30 Capsule 5    albuterol (PROVENTIL HFA) 108 (90 Base) MCG/ACT Aero Soln inhalation aerosol Inhale 2 Puffs every 6 hours as needed for Shortness of Breath. 8.5 g 5    dicyclomine (BENTYL) 20 MG Tab Take 1 Tablet by mouth 3 times a day before meals. 30 Tablet 5    loperamide (IMODIUM) 2 MG Cap Take 1 Capsule by mouth 1 time a day as needed for Diarrhea. 30 Capsule 2    hydrocortisone 2.5 % Cream topical cream Apply thin layer to areas of eczema daily as needed 30 g 3           ALLERGIES  Allergies   Allergen Reactions    Abilify Unspecified     Pt states that the medication affects her mobility, room starts spinning, unable to walk or use hands or feet, black outs    Norco [Apap-Fd&C Blue #1-Hydrocodone] Vomiting    Norco [Hydrocodone-Acetaminophen] Vomiting    Prazosin        PHYSICAL EXAM  VITAL SIGNS:/75   Pulse 98   Temp 36.3 °C (97.3 °F) (Temporal)   Resp 19   Ht 1.575 m  "(5' 2\")   Wt 100 kg (220 lb 7.4 oz)   SpO2 98%   BMI 40.32 kg/m²       GENERAL: Awake and alert  HEAD: Normocephalic and atraumatic  NECK: Normal range of motion  EYES: PERRL, +EOMI, conjunctiva white  ENT: Mucous membranes moist, oropharynx clear  PULMONARY: Normal effort  CARDIOVASCULAR: Normal rate, peripheral pulses 2+  ABDOMEN: Soft  BACK: No midline tenderness, no costovertebral tenderness  NEUROLOGICAL: Grossly non-focal neurological examination, speech normal, gait normal  EXTREMITIES: Right ankle and right wrist tenderness; no deformity, no open wounds; normal range of motion with discomfort; no signs of neurovascular compromise  SKIN: Warm and dry  PSYCHIATRIC: Affect is appropriate    DIAGNOSTIC STUDIES / PROCEDURES  EKG        LABS  Labs Reviewed - No data to display      RADIOLOGY  I have independently interpreted the diagnostic imaging associated with this visit and am waiting the final reading from the radiologist.   My preliminary interpretation is as follows: No fracture    Formal Radiologist interpretation:  DX-WRIST-COMPLETE 3+ RIGHT   Final Result         1.  No acute traumatic bony injury.      DX-ANKLE 3+ VIEWS RIGHT   Final Result         1.  No acute traumatic bony injury.             COURSE & MEDICAL DECISION MAKING    ED COURSE:        INTERVENTIONS BY ME:  Medications - No data to display    Response on recheck:      INITIAL ASSESSMENT, COURSE AND PLAN  Care Narrative:     Patient presents after a fall with complaints of right ankle and right wrist pain. Given her history of prior right ankle surgery, concern for fracture or hardware-related complications warranted imaging. Differential diagnoses included fracture, ligamentous sprain, or contusion. X-rays of the right ankle and right wrist were obtained and showed no acute fractures or hardware complications. No evidence of dislocation.    Given the benign imaging findings, her pain is most consistent with soft tissue injury " (sprain/contusion). The patient was placed in a splint for comfort. She remains ambulatory and neurologically intact. She was provided a work note as requested and given discharge instructions with return precautions for worsening pain, swelling, or signs of neurovascular compromise. She is advised to follow up with her primary care provider or orthopedics if symptoms persist.         ADDITIONAL PROBLEM LIST    DISPOSITION AND DISCUSSIONS  Discussion of management with other QHP or appropriate source(s): None     I have discussed management of the patient with the following physicians and ISIS's:      Escalation of care considered, and ultimately not performed:    Barriers to care at this time, including but not limited to:     Decision tools and prescription drugs considered including, but not limited to:      Medication List        CONTINUE taking these medications        Instructions   BD Pen Needle Ana U/F  Generic drug: Insulin Pen Needle 32 G x 4 mm   Doctor's comments: Please dispense whatever fits the victoza pen  Use 1 Each every day.  Dose: 1 Each     Lancets   Doctor's comments: Or per formulary preference.  Use one to check glucoses daily morning before breakfast     True Metrix Meter w/Device Kit   Doctor's comments: Or per formulary preference.  Test blood sugar as recommended by provider.            ASK your doctor about these medications        Instructions   acetaminophen 500 MG Tabs  Commonly known as: Tylenol   Take 1-2 Tablets by mouth every 6 hours as needed for Mild Pain or Moderate Pain.  Dose: 500-1,000 mg     albuterol 108 (90 Base) MCG/ACT Aers inhalation aerosol  Commonly known as: Proventil HFA   Inhale 2 Puffs every 6 hours as needed for Shortness of Breath.  Dose: 2 Puff     atorvastatin 10 MG Tabs  Commonly known as: Lipitor   Take 1 Tablet by mouth every evening.  Dose: 10 mg     citalopram 20 MG Tabs  Commonly known as: CeleXA   Take 1.5 Tablets by mouth every day.  Dose: 30 mg      dicyclomine 20 MG Tabs  Commonly known as: Bentyl   Take 1 Tablet by mouth 3 times a day before meals.  Dose: 20 mg     famotidine 20 MG Tabs  Commonly known as: Pepcid   Take 1 tablet by mouth twice daily  Dose: 20 mg     fenofibrate micronized 67 MG capsule  Commonly known as: Lofibra   Take 1 Capsule by mouth every morning before breakfast.  Dose: 67 mg     hydrocortisone 2.5 % Crea topical cream   Apply thin layer to areas of eczema daily as needed     * ibuprofen 600 MG Tabs  Commonly known as: Motrin   Take 1 Tablet by mouth every 6 hours as needed for Mild Pain or Moderate Pain.  Dose: 600 mg     * ibuprofen 800 MG Tabs  Commonly known as: Motrin   Take 1 Tablet by mouth every 8 hours as needed (pain).  Dose: 800 mg     lidocaine 5 % Ptch  Commonly known as: Lidoderm   Place 1 Patch on the skin every 24 hours.  Dose: 1 Patch     loperamide 2 MG Caps  Commonly known as: Imodium   Take 1 Capsule by mouth 1 time a day as needed for Diarrhea.  Dose: 2 mg     metFORMIN  MG Tb24  Commonly known as: Glucophage XR   Take 1 tablet by mouth once daily  Dose: 500 mg     norelgestromin-ethinyl estradiol 150-35 MCG/24HR patch  Commonly known as: Ortho Evra   Place 1 Patch on the skin every 7 days.  Dose: 1 Patch     * ondansetron 4 MG Tbdp  Commonly known as: Zofran ODT   Take 1 Tablet by mouth every 6 hours as needed for Nausea/Vomiting.  Dose: 4 mg     * ondansetron 4 MG Tbdp  Commonly known as: Zofran ODT   Take 1 Tablet by mouth every 8 hours as needed for Nausea/Vomiting.  Dose: 4 mg     prenatal vitamin with Fe/FA 28-0.8 MG Tabs   Take 1 tablet by mouth once daily  Dose: 1 Tablet     True Metrix Blood Glucose Test strip  Generic drug: glucose blood   Doctor's comments: Or per formulary preference.  Test glucose daily in the morning fasting     Victoza 18 MG/3ML Sopn  Generic drug: liraglutide   Inject 1.2 mg under the skin every day.  Dose: 1.2 mg           * This list has 4 medication(s) that are the same  as other medications prescribed for you. Read the directions carefully, and ask your doctor or other care provider to review them with you.                    FINAL DIAGNOSIS  1. Fall, initial encounter    2. Sprain of right wrist, initial encounter    3. Sprain of right ankle, unspecified ligament, initial encounter             Electronically signed by: Elias Tavarez DO ,11:38 PM 03/13/25

## 2025-03-14 NOTE — ED TRIAGE NOTES
Chief Complaint   Patient presents with    GLF     Pt reports she stepped on a cat and fell down the stairs. States feet flew out from under her and fell onto her bottom and back. Pt reports pain to R ankle and R wrist. CMS intact       Pt to triage via w/c for above complaint. Presents with R ankle and wrist pain after falling off the stairs. -head strike    Pt back to lobby, educated on triage process and encourage to alert staff of any changes.     Vitals:    03/13/25 2116   BP: 127/82   Pulse: (!) 102   Resp: 18   Temp: 36.3 °C (97.3 °F)   SpO2: 96%

## 2025-03-14 NOTE — ED NOTES
Pt is for discharge, home instructions advised, follow up instructions and health education imparted. Patient verbalized understanding.  Left ED awake and coherent. Not in distress.

## 2025-03-14 NOTE — ED NOTES
Off the Shelf Air-Gel Ankle Stir-up placed on pt. Pt educated on use and placement of medical device. Pt verbalized understanding.

## 2025-05-02 ENCOUNTER — NON-PROVIDER VISIT (OUTPATIENT)
Dept: OCCUPATIONAL MEDICINE | Facility: CLINIC | Age: 30
End: 2025-05-02

## 2025-05-02 DIAGNOSIS — Z02.89 VISIT FOR OCCUPATIONAL HEALTH EXAMINATION: ICD-10-CM

## 2025-05-02 PROCEDURE — 8907 PR URINE COLLECT ONLY: Performed by: PREVENTIVE MEDICINE

## 2025-06-02 ENCOUNTER — HOSPITAL ENCOUNTER (EMERGENCY)
Facility: MEDICAL CENTER | Age: 30
End: 2025-06-02
Attending: STUDENT IN AN ORGANIZED HEALTH CARE EDUCATION/TRAINING PROGRAM
Payer: COMMERCIAL

## 2025-06-02 ENCOUNTER — APPOINTMENT (OUTPATIENT)
Dept: RADIOLOGY | Facility: MEDICAL CENTER | Age: 30
End: 2025-06-02
Attending: STUDENT IN AN ORGANIZED HEALTH CARE EDUCATION/TRAINING PROGRAM
Payer: COMMERCIAL

## 2025-06-02 VITALS
RESPIRATION RATE: 18 BRPM | HEART RATE: 79 BPM | DIASTOLIC BLOOD PRESSURE: 67 MMHG | SYSTOLIC BLOOD PRESSURE: 114 MMHG | OXYGEN SATURATION: 94 % | WEIGHT: 220.68 LBS | HEIGHT: 62 IN | TEMPERATURE: 97.1 F | BODY MASS INDEX: 40.61 KG/M2

## 2025-06-02 DIAGNOSIS — R82.4 KETONURIA: ICD-10-CM

## 2025-06-02 DIAGNOSIS — K76.0 HEPATIC STEATOSIS: Primary | ICD-10-CM

## 2025-06-02 DIAGNOSIS — K80.20 CALCULUS OF GALLBLADDER WITHOUT CHOLECYSTITIS WITHOUT OBSTRUCTION: ICD-10-CM

## 2025-06-02 DIAGNOSIS — R73.9 HYPERGLYCEMIA: ICD-10-CM

## 2025-06-02 DIAGNOSIS — R10.31 RLQ ABDOMINAL PAIN: ICD-10-CM

## 2025-06-02 LAB
ALBUMIN SERPL BCP-MCNC: 3.8 G/DL (ref 3.2–4.9)
ALBUMIN/GLOB SERPL: 1.5 G/DL
ALP SERPL-CCNC: 55 U/L (ref 30–99)
ALT SERPL-CCNC: 27 U/L (ref 2–50)
ANION GAP SERPL CALC-SCNC: 14 MMOL/L (ref 7–16)
ANISOCYTOSIS BLD QL SMEAR: ABNORMAL
APPEARANCE UR: CLEAR
AST SERPL-CCNC: 22 U/L (ref 12–45)
BASOPHILS # BLD AUTO: 0 % (ref 0–1.8)
BASOPHILS # BLD: 0 K/UL (ref 0–0.12)
BILIRUB SERPL-MCNC: 0.3 MG/DL (ref 0.1–1.5)
BILIRUB UR QL STRIP.AUTO: NEGATIVE
BUN SERPL-MCNC: 10 MG/DL (ref 8–22)
CALCIUM ALBUM COR SERPL-MCNC: 9.2 MG/DL (ref 8.5–10.5)
CALCIUM SERPL-MCNC: 9 MG/DL (ref 8.5–10.5)
CHLORIDE SERPL-SCNC: 106 MMOL/L (ref 96–112)
CO2 SERPL-SCNC: 18 MMOL/L (ref 20–33)
COLOR UR: YELLOW
COMMENT NL1176: NORMAL
CREAT SERPL-MCNC: 0.62 MG/DL (ref 0.5–1.4)
EOSINOPHIL # BLD AUTO: 0.17 K/UL (ref 0–0.51)
EOSINOPHIL NFR BLD: 2.6 % (ref 0–6.9)
ERYTHROCYTE [DISTWIDTH] IN BLOOD BY AUTOMATED COUNT: 44.7 FL (ref 35.9–50)
GFR SERPLBLD CREATININE-BSD FMLA CKD-EPI: 121 ML/MIN/1.73 M 2
GLOBULIN SER CALC-MCNC: 2.6 G/DL (ref 1.9–3.5)
GLUCOSE SERPL-MCNC: 139 MG/DL (ref 65–99)
GLUCOSE UR STRIP.AUTO-MCNC: >=1000 MG/DL
HCG SERPL QL: NEGATIVE
HCT VFR BLD AUTO: 37.5 % (ref 37–47)
HGB BLD-MCNC: 13.1 G/DL (ref 12–16)
KETONES UR STRIP.AUTO-MCNC: ABNORMAL MG/DL
LEUKOCYTE ESTERASE UR QL STRIP.AUTO: NEGATIVE
LIPASE SERPL-CCNC: 41 U/L (ref 11–82)
LYMPHOCYTES # BLD AUTO: 3.07 K/UL (ref 1–4.8)
LYMPHOCYTES NFR BLD: 46.5 % (ref 22–41)
MACROCYTES BLD QL SMEAR: ABNORMAL
MANUAL DIFF BLD: NORMAL
MCH RBC QN AUTO: 31.4 PG (ref 27–33)
MCHC RBC AUTO-ENTMCNC: 34.9 G/DL (ref 32.2–35.5)
MCV RBC AUTO: 89.9 FL (ref 81.4–97.8)
MICRO URNS: ABNORMAL
MICROCYTES BLD QL SMEAR: ABNORMAL
MONOCYTES # BLD AUTO: 0.5 K/UL (ref 0–0.85)
MONOCYTES NFR BLD AUTO: 7 % (ref 0–13.4)
MORPHOLOGY BLD-IMP: NORMAL
NEUTROPHILS # BLD AUTO: 2.9 K/UL (ref 1.82–7.42)
NEUTROPHILS NFR BLD: 43.9 % (ref 44–72)
NITRITE UR QL STRIP.AUTO: NEGATIVE
NRBC # BLD AUTO: 0 K/UL
NRBC BLD-RTO: 0 /100 WBC (ref 0–0.2)
PH UR STRIP.AUTO: 6.5 [PH] (ref 5–8)
PLATELET # BLD AUTO: 192 K/UL (ref 164–446)
PLATELET BLD QL SMEAR: NORMAL
PMV BLD AUTO: 10.2 FL (ref 9–12.9)
POTASSIUM SERPL-SCNC: 3.6 MMOL/L (ref 3.6–5.5)
PROT SERPL-MCNC: 6.4 G/DL (ref 6–8.2)
PROT UR QL STRIP: NEGATIVE MG/DL
RBC # BLD AUTO: 4.17 M/UL (ref 4.2–5.4)
RBC BLD AUTO: PRESENT
RBC UR QL AUTO: NEGATIVE
SMUDGE CELLS BLD QL SMEAR: NORMAL
SODIUM SERPL-SCNC: 138 MMOL/L (ref 135–145)
SP GR UR STRIP.AUTO: 1.04
UROBILINOGEN UR STRIP.AUTO-MCNC: 0.2 EU/DL
WBC # BLD AUTO: 6.6 K/UL (ref 4.8–10.8)

## 2025-06-02 PROCEDURE — 80053 COMPREHEN METABOLIC PANEL: CPT

## 2025-06-02 PROCEDURE — 700111 HCHG RX REV CODE 636 W/ 250 OVERRIDE (IP): Mod: JZ | Performed by: STUDENT IN AN ORGANIZED HEALTH CARE EDUCATION/TRAINING PROGRAM

## 2025-06-02 PROCEDURE — 84703 CHORIONIC GONADOTROPIN ASSAY: CPT

## 2025-06-02 PROCEDURE — 96374 THER/PROPH/DIAG INJ IV PUSH: CPT

## 2025-06-02 PROCEDURE — 74177 CT ABD & PELVIS W/CONTRAST: CPT

## 2025-06-02 PROCEDURE — 85007 BL SMEAR W/DIFF WBC COUNT: CPT

## 2025-06-02 PROCEDURE — A9270 NON-COVERED ITEM OR SERVICE: HCPCS | Performed by: STUDENT IN AN ORGANIZED HEALTH CARE EDUCATION/TRAINING PROGRAM

## 2025-06-02 PROCEDURE — 85027 COMPLETE CBC AUTOMATED: CPT

## 2025-06-02 PROCEDURE — 81003 URINALYSIS AUTO W/O SCOPE: CPT

## 2025-06-02 PROCEDURE — 700102 HCHG RX REV CODE 250 W/ 637 OVERRIDE(OP): Performed by: STUDENT IN AN ORGANIZED HEALTH CARE EDUCATION/TRAINING PROGRAM

## 2025-06-02 PROCEDURE — 83690 ASSAY OF LIPASE: CPT

## 2025-06-02 PROCEDURE — 99284 EMERGENCY DEPT VISIT MOD MDM: CPT

## 2025-06-02 PROCEDURE — 36415 COLL VENOUS BLD VENIPUNCTURE: CPT

## 2025-06-02 PROCEDURE — 700117 HCHG RX CONTRAST REV CODE 255: Performed by: STUDENT IN AN ORGANIZED HEALTH CARE EDUCATION/TRAINING PROGRAM

## 2025-06-02 RX ORDER — ACETAMINOPHEN 500 MG
1000 TABLET ORAL ONCE
Status: COMPLETED | OUTPATIENT
Start: 2025-06-02 | End: 2025-06-02

## 2025-06-02 RX ORDER — ONDANSETRON 2 MG/ML
4 INJECTION INTRAMUSCULAR; INTRAVENOUS ONCE
Status: COMPLETED | OUTPATIENT
Start: 2025-06-02 | End: 2025-06-02

## 2025-06-02 RX ADMIN — ONDANSETRON 4 MG: 2 INJECTION INTRAMUSCULAR; INTRAVENOUS at 02:41

## 2025-06-02 RX ADMIN — ACETAMINOPHEN 1000 MG: 500 TABLET ORAL at 02:41

## 2025-06-02 RX ADMIN — IOHEXOL 100 ML: 350 INJECTION, SOLUTION INTRAVENOUS at 04:00

## 2025-06-02 ASSESSMENT — FIBROSIS 4 INDEX: FIB4 SCORE: 1.64

## 2025-06-02 ASSESSMENT — PAIN DESCRIPTION - DESCRIPTORS: DESCRIPTORS: SHARP

## 2025-06-02 ASSESSMENT — PAIN DESCRIPTION - PAIN TYPE
TYPE: ACUTE PAIN
TYPE: ACUTE PAIN

## 2025-06-02 NOTE — DISCHARGE INSTRUCTIONS
As we discussed your testing today does not show any dangerous cause of your pain.  You should continue to keep an eye on it and follow-up with your primary care doctor if your having persistent pain in the next 3 to 5 days.  Return to the ER with any worse pain, fever, recurrent vomiting or blood in your vomit or blood in stool or if you are otherwise feeling much worse.

## 2025-06-02 NOTE — ED PROVIDER NOTES
ED Provider Note    CHIEF COMPLAINT  Chief Complaint   Patient presents with    RLQ Pain     C/o RLQ pain started yesterday off and on. Pain worse with ambulating/ moving. Described pain as stabbing 7/10. Also states slight pain with urination. + nausea. Denies vomiting.       EXTERNAL RECORDS REVIEWED  Outpatient Notes patient was seen in the emergency room 3/13/2025 after a mechanical fall with ankle pain and wrist pain.  No fractures appreciated on imaging at that time.    HPI/ROS  LIMITATION TO HISTORY   Select: : None      Anisha Tristan is a 30 y.o. female who presents to the emergency room for evaluation of abdominal pain.  The patient reports that throughout the day yesterday she was experiencing some intermittent diffuse lower abdominal pain occurring randomly that moved to the right lower abdomen.  She describes it as a stabbing pain 7 out of 10 severity with any attempted movement.  She states that when she is still the pain subsides.  She reports nausea but no vomiting.  She reports some dysuria as well but no additional urinary symptoms, abnormal vaginal bleeding or discharge, fevers or chills.  She states it is unlikely but possible that she could be pregnant and notes a history of PCOS and endometriosis.  She has not had abdominal surgery previously.  She notes a prior history of kidney stones but states that this pain feels somewhat different.    PAST MEDICAL HISTORY   has a past medical history of ASTHMA, Asthma, Carpal tunnel syndrome, Diabetes (HCC), Hyperlipidemia, Morbid obesity (HCC), PCOS (polycystic ovarian syndrome), and Vitamin D deficiency.    SURGICAL HISTORY   has a past surgical history that includes eye surgery; open reduction; carpal tunnel release (Bilateral); and orif, ankle (Right, 2010).    FAMILY HISTORY  Family History   Problem Relation Age of Onset    Cancer Mother         throat    Alcohol/Drug Mother     Cancer Maternal Uncle     Diabetes Maternal Grandmother      Hypertension Maternal Grandmother     Heart Disease Neg Hx     Stroke Neg Hx        SOCIAL HISTORY  Social History     Tobacco Use    Smoking status: Former     Current packs/day: 0.00     Average packs/day: 0.3 packs/day for 15.0 years (3.8 ttl pk-yrs)     Types: Cigarettes     Start date: 2/15/2006     Quit date: 2/15/2021     Years since quittin.2    Smokeless tobacco: Never   Vaping Use    Vaping status: Every Day    Substances: Nicotine, Flavoring    Devices: Disposable   Substance and Sexual Activity    Alcohol use: Yes     Comment: occ.    Drug use: Not Currently     Types: Methamphetamines, Inhaled     Comment: history of meth from 2770-7117, no IV, one use a couple months ago     Sexual activity: Yes     Partners: Male     Birth control/protection: Condom       CURRENT MEDICATIONS  Home Medications       Reviewed by Tray Cordoba R.N. (Registered Nurse) on 25 at 0144  Med List Status: Partial     Medication Last Dose Status   acetaminophen (TYLENOL) 500 MG Tab  Active   albuterol (PROVENTIL HFA) 108 (90 Base) MCG/ACT Aero Soln inhalation aerosol  Active   atorvastatin (LIPITOR) 10 MG Tab  Active   Blood Glucose Monitoring Suppl (BLOOD GLUCOSE MONITOR SYSTEM) w/Device Kit  Active   citalopram (CELEXA) 20 MG Tab  Active   dicyclomine (BENTYL) 20 MG Tab  Active   famotidine (PEPCID) 20 MG Tab  Active   fenofibrate micronized (LOFIBRA) 67 MG capsule  Active   glucose blood strip  Active   hydrocortisone 2.5 % Cream topical cream  Active   ibuprofen (MOTRIN) 600 MG Tab  Active   ibuprofen (MOTRIN) 800 MG Tab  Active   Insulin Pen Needle 32 G x 4 mm  Active   Lancets  Active   lidocaine (LIDODERM) 5 % Patch  Active   liraglutide (VICTOZA) 18 MG/3ML Solution Pen-injector  Active   loperamide (IMODIUM) 2 MG Cap  Active   metFORMIN ER (GLUCOPHAGE XR) 500 MG TABLET SR 24 HR  Active   norelgestromin-ethinyl estradiol (ORTHO EVRA) 150-35 MCG/24HR patch  Active   ondansetron (ZOFRAN ODT) 4 MG TABLET DISPERSIBLE  " Active   ondansetron (ZOFRAN ODT) 4 MG TABLET DISPERSIBLE  Active   prenatal vitamin with Fe/FA (SANDOR PRENATAL) 28-0.8 MG Tab  Active                  Audit from Redirected Encounters    **Home medications have not yet been reviewed for this encounter**         ALLERGIES  Allergies[1]    PHYSICAL EXAM  VITAL SIGNS: /67   Pulse 79   Temp 36.2 °C (97.1 °F) (Temporal)   Resp 18   Ht 1.575 m (5' 2\")   Wt 100 kg (220 lb 10.9 oz)   SpO2 94%   BMI 40.36 kg/m²    Constitutional: No acute distress  HEENT: Atraumatic, normocephalic, pupils are equal round reactive to light, nose normal, mouth shows moist mucous membranes  Cardiovascular: Regular rate and rhythm, no murmur, rub or gallop, 2+ pulses peripherally x4  Thorax & Lungs: No respiratory distress, no wheezes, rales or rhonchi, no chest wall tenderness.  GI: Soft, non-distended, right lower quadrant tenderness at McBurney's point, no guarding, no rebound.  No CVA tenderness.  Skin: Warm, dry, no acute rash or lesion  Musculoskeletal: Moving all extremities, no acute deformity, no edema, no tenderness  Neurologic: A&Ox3, at baseline mentation  Psychiatric: Appropriate affect for situation at this time      EKG/LABS  Labs Reviewed   CBC WITH DIFFERENTIAL - Abnormal; Notable for the following components:       Result Value    RBC 4.17 (*)     Neutrophils-Polys 43.90 (*)     Lymphocytes 46.50 (*)     All other components within normal limits   COMP METABOLIC PANEL - Abnormal; Notable for the following components:    Co2 18 (*)     Glucose 139 (*)     All other components within normal limits   URINALYSIS,CULTURE IF INDICATED - Abnormal; Notable for the following components:    Glucose >=1000 (*)     Ketones Trace (*)     All other components within normal limits   LIPASE   HCG QUAL SERUM   ESTIMATED GFR   DIFFERENTIAL MANUAL   PERIPHERAL SMEAR REVIEW   PLATELET ESTIMATE   MORPHOLOGY         RADIOLOGY/PROCEDURES   I have independently interpreted the " diagnostic imaging associated with this visit and am waiting the final reading from the radiologist.   My preliminary interpretation is as follows: No evidence of appendicitis, obstructing ureterolithiasis.    Radiologist interpretation:  CT-ABDOMEN-PELVIS WITH   Final Result      1.  No acute process seen.   2.  Hepatic steatosis.   3.  Cholelithiasis.   4.  1 mm nonobstructing left renal stone.             COURSE & MEDICAL DECISION MAKING    ASSESSMENT, COURSE AND PLAN  Care Narrative:     Patient with a history of PCOS, endometriosis presents to the ER for evaluation of waxing waning lower abdominal pain migratory to the right lower quadrant.  Has some tenderness in the right lower quadrant on exam but otherwise a nonperitoneal abdomen.  Vital signs stable.  Differential diagnosis includes appendicitis, ovarian cyst, UTI, migratory ureterolithiasis, constipation with bowel spasms.  Less likely ovarian torsion given no severe sudden onset pain, no pain while at rest.  Considered pregnancy with related pathology.  Will obtain lab work and CT scan to further assess.  Will treat symptomatically with IV ondansetron and acetaminophen for pain.    Patient's laboratory workup is quite reassuring with no significant leukocytosis, anemia, evidence of biliary obstruction, HIGINIO or significant electrolyte abnormality.  Urinalysis with no evidence of infection.  It is remarkable for significant glucose urea and trace ketones but patient is not in DKA.  Suspect secondary to medication management of diabetes.  Normal lipase argues against pancreatitis and patient is not pregnant.  CT scan is likewise reassuring with no acute process appreciated.  She has cholelithiasis but again no other clinical features suggestive of acute cholecystitis.  No migratory ureterolithiasis or urinary obstruction.  No appendicitis.      On reassessment patient resting comfortably.  She is feeling better after treatment with acetaminophen and  ondansetron.  She is tolerating p.o. Discussed reassuring workup and recommended follow-up with her primary care doctor in 3 to 5 days for recheck if still symptomatic.  ER return precautions discussed and all questions answered and she was discharged stable condition.    ADDITIONAL PROBLEMS MANAGED  None    DISPOSITION AND DISCUSSIONS  I have discussed management of the patient with the following physicians and ISIS's: None    Discussion of management with other QHP or appropriate source(s): None       FINAL DIAGNOSIS  1. Hepatic steatosis    2. RLQ abdominal pain    3. Ketonuria    4. Hyperglycemia    5. Calculus of gallbladder without cholecystitis without obstruction         Electronically signed by: René Gonzalez M.D., 6/2/2025 2:06 AM           [1]   Allergies  Allergen Reactions    Abilify Unspecified     Pt states that the medication affects her mobility, room starts spinning, unable to walk or use hands or feet, black outs    Norco [Apap-Fd&C Blue #1-Hydrocodone] Vomiting    Norco [Hydrocodone-Acetaminophen] Vomiting    Prazosin

## 2025-06-02 NOTE — ED TRIAGE NOTES
Anisha Tristan  30 y.o.  female  Chief Complaint   Patient presents with    RLQ Pain     C/o RLQ pain started yesterday off and on. Pain worse with ambulating/ moving. Described pain as stabbing 7/10. Also states slight pain with urination. + nausea. Denies vomiting.

## 2025-06-25 ENCOUNTER — APPOINTMENT (OUTPATIENT)
Dept: RADIOLOGY | Facility: MEDICAL CENTER | Age: 30
End: 2025-06-25
Attending: STUDENT IN AN ORGANIZED HEALTH CARE EDUCATION/TRAINING PROGRAM
Payer: COMMERCIAL

## 2025-06-25 ENCOUNTER — HOSPITAL ENCOUNTER (EMERGENCY)
Facility: MEDICAL CENTER | Age: 30
End: 2025-06-25
Attending: STUDENT IN AN ORGANIZED HEALTH CARE EDUCATION/TRAINING PROGRAM
Payer: COMMERCIAL

## 2025-06-25 VITALS
HEIGHT: 62 IN | BODY MASS INDEX: 39.03 KG/M2 | OXYGEN SATURATION: 99 % | HEART RATE: 82 BPM | TEMPERATURE: 97.1 F | DIASTOLIC BLOOD PRESSURE: 68 MMHG | WEIGHT: 212.08 LBS | SYSTOLIC BLOOD PRESSURE: 105 MMHG | RESPIRATION RATE: 16 BRPM

## 2025-06-25 DIAGNOSIS — R10.32 LEFT LOWER QUADRANT ABDOMINAL PAIN: Primary | ICD-10-CM

## 2025-06-25 LAB
ALBUMIN SERPL BCP-MCNC: 4.2 G/DL (ref 3.2–4.9)
ALBUMIN/GLOB SERPL: 1.4 G/DL
ALP SERPL-CCNC: 55 U/L (ref 30–99)
ALT SERPL-CCNC: 24 U/L (ref 2–50)
ANION GAP SERPL CALC-SCNC: 16 MMOL/L (ref 7–16)
APPEARANCE UR: CLEAR
AST SERPL-CCNC: 24 U/L (ref 12–45)
B-OH-BUTYR SERPL-MCNC: 0.25 MMOL/L (ref 0.02–0.27)
BASOPHILS # BLD AUTO: 0.3 % (ref 0–1.8)
BASOPHILS # BLD: 0.02 K/UL (ref 0–0.12)
BILIRUB SERPL-MCNC: 0.4 MG/DL (ref 0.1–1.5)
BILIRUB UR QL STRIP.AUTO: NEGATIVE
BUN SERPL-MCNC: 15 MG/DL (ref 8–22)
CALCIUM ALBUM COR SERPL-MCNC: 9.3 MG/DL (ref 8.5–10.5)
CALCIUM SERPL-MCNC: 9.5 MG/DL (ref 8.5–10.5)
CHLORIDE SERPL-SCNC: 104 MMOL/L (ref 96–112)
CO2 SERPL-SCNC: 18 MMOL/L (ref 20–33)
COLOR UR: YELLOW
CREAT SERPL-MCNC: 0.61 MG/DL (ref 0.5–1.4)
EOSINOPHIL # BLD AUTO: 0.04 K/UL (ref 0–0.51)
EOSINOPHIL NFR BLD: 0.6 % (ref 0–6.9)
ERYTHROCYTE [DISTWIDTH] IN BLOOD BY AUTOMATED COUNT: 44.5 FL (ref 35.9–50)
GFR SERPLBLD CREATININE-BSD FMLA CKD-EPI: 122 ML/MIN/1.73 M 2
GLOBULIN SER CALC-MCNC: 3 G/DL (ref 1.9–3.5)
GLUCOSE SERPL-MCNC: 158 MG/DL (ref 65–99)
GLUCOSE UR STRIP.AUTO-MCNC: >=1000 MG/DL
HCG SERPL QL: NEGATIVE
HCT VFR BLD AUTO: 40.8 % (ref 37–47)
HGB BLD-MCNC: 14.6 G/DL (ref 12–16)
IMM GRANULOCYTES # BLD AUTO: 0.05 K/UL (ref 0–0.11)
IMM GRANULOCYTES NFR BLD AUTO: 0.8 % (ref 0–0.9)
KETONES UR STRIP.AUTO-MCNC: ABNORMAL MG/DL
LEUKOCYTE ESTERASE UR QL STRIP.AUTO: NEGATIVE
LIPASE SERPL-CCNC: 38 U/L (ref 11–82)
LYMPHOCYTES # BLD AUTO: 2.05 K/UL (ref 1–4.8)
LYMPHOCYTES NFR BLD: 31.3 % (ref 22–41)
MCH RBC QN AUTO: 31.7 PG (ref 27–33)
MCHC RBC AUTO-ENTMCNC: 35.8 G/DL (ref 32.2–35.5)
MCV RBC AUTO: 88.5 FL (ref 81.4–97.8)
MICRO URNS: ABNORMAL
MONOCYTES # BLD AUTO: 0.32 K/UL (ref 0–0.85)
MONOCYTES NFR BLD AUTO: 4.9 % (ref 0–13.4)
NEUTROPHILS # BLD AUTO: 4.07 K/UL (ref 1.82–7.42)
NEUTROPHILS NFR BLD: 62.1 % (ref 44–72)
NITRITE UR QL STRIP.AUTO: NEGATIVE
NRBC # BLD AUTO: 0 K/UL
NRBC BLD-RTO: 0 /100 WBC (ref 0–0.2)
PH UR STRIP.AUTO: 5.5 [PH] (ref 5–8)
PLATELET # BLD AUTO: 243 K/UL (ref 164–446)
PMV BLD AUTO: 9.8 FL (ref 9–12.9)
POTASSIUM SERPL-SCNC: 3.8 MMOL/L (ref 3.6–5.5)
PROT SERPL-MCNC: 7.2 G/DL (ref 6–8.2)
PROT UR QL STRIP: NEGATIVE MG/DL
RBC # BLD AUTO: 4.61 M/UL (ref 4.2–5.4)
RBC UR QL AUTO: NEGATIVE
SODIUM SERPL-SCNC: 138 MMOL/L (ref 135–145)
SP GR UR STRIP.AUTO: 1.04
UROBILINOGEN UR STRIP.AUTO-MCNC: 0.2 EU/DL
WBC # BLD AUTO: 6.6 K/UL (ref 4.8–10.8)

## 2025-06-25 PROCEDURE — 82010 KETONE BODYS QUAN: CPT

## 2025-06-25 PROCEDURE — 700117 HCHG RX CONTRAST REV CODE 255: Performed by: STUDENT IN AN ORGANIZED HEALTH CARE EDUCATION/TRAINING PROGRAM

## 2025-06-25 PROCEDURE — 83690 ASSAY OF LIPASE: CPT

## 2025-06-25 PROCEDURE — 81003 URINALYSIS AUTO W/O SCOPE: CPT

## 2025-06-25 PROCEDURE — 36415 COLL VENOUS BLD VENIPUNCTURE: CPT

## 2025-06-25 PROCEDURE — 85025 COMPLETE CBC W/AUTO DIFF WBC: CPT

## 2025-06-25 PROCEDURE — 700105 HCHG RX REV CODE 258: Performed by: STUDENT IN AN ORGANIZED HEALTH CARE EDUCATION/TRAINING PROGRAM

## 2025-06-25 PROCEDURE — 99284 EMERGENCY DEPT VISIT MOD MDM: CPT

## 2025-06-25 PROCEDURE — 84703 CHORIONIC GONADOTROPIN ASSAY: CPT

## 2025-06-25 PROCEDURE — 80053 COMPREHEN METABOLIC PANEL: CPT

## 2025-06-25 PROCEDURE — 74177 CT ABD & PELVIS W/CONTRAST: CPT

## 2025-06-25 RX ORDER — SODIUM CHLORIDE, SODIUM LACTATE, POTASSIUM CHLORIDE, CALCIUM CHLORIDE 600; 310; 30; 20 MG/100ML; MG/100ML; MG/100ML; MG/100ML
1000 INJECTION, SOLUTION INTRAVENOUS ONCE
Status: COMPLETED | OUTPATIENT
Start: 2025-06-25 | End: 2025-06-25

## 2025-06-25 RX ADMIN — IOHEXOL 95 ML: 350 INJECTION, SOLUTION INTRAVENOUS at 20:00

## 2025-06-25 RX ADMIN — SODIUM CHLORIDE, POTASSIUM CHLORIDE, SODIUM LACTATE AND CALCIUM CHLORIDE 1000 ML: 600; 310; 30; 20 INJECTION, SOLUTION INTRAVENOUS at 18:15

## 2025-06-25 ASSESSMENT — FIBROSIS 4 INDEX: FIB4 SCORE: 0.71

## 2025-06-25 NOTE — ED TRIAGE NOTES
Chief Complaint   Patient presents with    Abdominal Pain     Pt reports intermittent LLQ pain since 0700     Pt ambulatory to triage for above complaint. Pt reports recent abnormal menses.    Pt is alert & oriented and follows commands. Pt speaking in full sentences and responds appropriately to questions. No acute distress noted in triage. Respirations are even and unlabored. Skin is pink/warm/dry.    Pt placed back in lobby and educated on triage process. Pt encouraged to alert staff to any changes in condition.

## 2025-06-26 DIAGNOSIS — Z00.6 CLINICAL TRIAL PARTICIPANT: ICD-10-CM

## 2025-06-26 NOTE — ED PROVIDER NOTES
ED Provider Note    CHIEF COMPLAINT  Chief Complaint   Patient presents with    Abdominal Pain     Pt reports intermittent LLQ pain since 0700       EXTERNAL RECORDS REVIEWED  Other ER visit from 2025 patient seen for lower quadrant abdominal pain    HPI/ROS  LIMITATION TO HISTORY     OUTSIDE HISTORIAN(S):      Anisha Tristan is a 30 y.o. female who presents with left-sided abdominal pain intermittently throughout the day.  Patient says that since 7 this morning she has been having intermittent left-sided midline abdominal pain.  Patient denies ongoing pain.  Patient says that at times pain is severe.  Patient denies fever, chills, nausea, vomiting, changes in urination, vaginal bleeding or discharge, diarrhea, bloody stools.    PAST MEDICAL HISTORY   has a past medical history of ASTHMA, Asthma, Carpal tunnel syndrome, Diabetes (HCC), Hyperlipidemia, Morbid obesity (HCC), PCOS (polycystic ovarian syndrome), and Vitamin D deficiency.    SURGICAL HISTORY   has a past surgical history that includes eye surgery; open reduction; carpal tunnel release (Bilateral); and orif, ankle (Right, 2010).    FAMILY HISTORY  Family History   Problem Relation Age of Onset    Cancer Mother         throat    Alcohol/Drug Mother     Cancer Maternal Uncle     Diabetes Maternal Grandmother     Hypertension Maternal Grandmother     Heart Disease Neg Hx     Stroke Neg Hx        SOCIAL HISTORY  Social History     Tobacco Use    Smoking status: Former     Current packs/day: 0.00     Average packs/day: 0.3 packs/day for 15.0 years (3.8 ttl pk-yrs)     Types: Cigarettes     Start date: 2/15/2006     Quit date: 2/15/2021     Years since quittin.3    Smokeless tobacco: Never   Vaping Use    Vaping status: Every Day    Substances: Nicotine, Flavoring    Devices: Disposable   Substance and Sexual Activity    Alcohol use: Yes     Comment: occ.    Drug use: Not Currently     Types: Methamphetamines, Inhaled     Comment: history of meth  "from 3625-7977, no IV, one use a couple months ago     Sexual activity: Yes     Partners: Male     Birth control/protection: Condom       CURRENT MEDICATIONS  Home Medications       Reviewed by Janeth Mott R.N. (Registered Nurse) on 06/25/25 at 1607  Med List Status: Partial     Medication Last Dose Status   acetaminophen (TYLENOL) 500 MG Tab  Active   albuterol (PROVENTIL HFA) 108 (90 Base) MCG/ACT Aero Soln inhalation aerosol  Active   atorvastatin (LIPITOR) 10 MG Tab  Active   Blood Glucose Monitoring Suppl (BLOOD GLUCOSE MONITOR SYSTEM) w/Device Kit  Active   citalopram (CELEXA) 20 MG Tab  Active   dicyclomine (BENTYL) 20 MG Tab  Active   famotidine (PEPCID) 20 MG Tab  Active   fenofibrate micronized (LOFIBRA) 67 MG capsule  Active   glucose blood strip  Active   hydrocortisone 2.5 % Cream topical cream  Active   ibuprofen (MOTRIN) 600 MG Tab  Active   ibuprofen (MOTRIN) 800 MG Tab  Active   Insulin Pen Needle 32 G x 4 mm  Active   Lancets  Active   lidocaine (LIDODERM) 5 % Patch  Active   liraglutide (VICTOZA) 18 MG/3ML Solution Pen-injector  Active   loperamide (IMODIUM) 2 MG Cap  Active   metFORMIN ER (GLUCOPHAGE XR) 500 MG TABLET SR 24 HR  Active   norelgestromin-ethinyl estradiol (ORTHO EVRA) 150-35 MCG/24HR patch  Active   ondansetron (ZOFRAN ODT) 4 MG TABLET DISPERSIBLE  Active   ondansetron (ZOFRAN ODT) 4 MG TABLET DISPERSIBLE  Active   prenatal vitamin with Fe/FA (SANDOR PRENATAL) 28-0.8 MG Tab  Active                    ALLERGIES  Allergies[1]    PHYSICAL EXAM  VITAL SIGNS: /68   Pulse 82   Temp 36.2 °C (97.1 °F) (Temporal)   Resp 16   Ht 1.575 m (5' 2\")   Wt 96.2 kg (212 lb 1.3 oz)   LMP 06/14/2025 (Approximate)   SpO2 99%   BMI 38.79 kg/m²    Constitutional: Alert in no apparent distress.  HENT: No signs of trauma, Bilateral external ears normal, Nose normal.   Eyes: Pupils are equal and reactive, Conjunctiva normal, Non-icteric.   Neck: Normal range of motion, No tenderness, " Supple, No stridor.   Cardiovascular: Regular rate and rhythm, no murmurs.   Thorax & Lungs: Normal breath sounds, No respiratory distress, No wheezing, No chest tenderness.   Abdomen: Bowel sounds normal, Soft, mild tenderness along left midline abdomen, no rebound tenderness or guarding, No masses, No pulsatile masses.   Skin: Warm, Dry, No erythema, No rash.   Back: No bony tenderness, No CVA tenderness.   Extremities: Intact distal pulses, No edema, No tenderness, No cyanosis  Musculoskeletal: Good range of motion in all major joints. No tenderness to palpation or major deformities noted.   Neurologic: Alert , Normal motor function, Normal sensory function, No focal deficits noted.       EKG/LABS  Results for orders placed or performed during the hospital encounter of 06/25/25   CBC WITH DIFFERENTIAL    Collection Time: 06/25/25  4:13 PM   Result Value Ref Range    WBC 6.6 4.8 - 10.8 K/uL    RBC 4.61 4.20 - 5.40 M/uL    Hemoglobin 14.6 12.0 - 16.0 g/dL    Hematocrit 40.8 37.0 - 47.0 %    MCV 88.5 81.4 - 97.8 fL    MCH 31.7 27.0 - 33.0 pg    MCHC 35.8 (H) 32.2 - 35.5 g/dL    RDW 44.5 35.9 - 50.0 fL    Platelet Count 243 164 - 446 K/uL    MPV 9.8 9.0 - 12.9 fL    Neutrophils-Polys 62.10 44.00 - 72.00 %    Lymphocytes 31.30 22.00 - 41.00 %    Monocytes 4.90 0.00 - 13.40 %    Eosinophils 0.60 0.00 - 6.90 %    Basophils 0.30 0.00 - 1.80 %    Immature Granulocytes 0.80 0.00 - 0.90 %    Nucleated RBC 0.00 0.00 - 0.20 /100 WBC    Neutrophils (Absolute) 4.07 1.82 - 7.42 K/uL    Lymphs (Absolute) 2.05 1.00 - 4.80 K/uL    Monos (Absolute) 0.32 0.00 - 0.85 K/uL    Eos (Absolute) 0.04 0.00 - 0.51 K/uL    Baso (Absolute) 0.02 0.00 - 0.12 K/uL    Immature Granulocytes (abs) 0.05 0.00 - 0.11 K/uL    NRBC (Absolute) 0.00 K/uL   COMP METABOLIC PANEL    Collection Time: 06/25/25  4:13 PM   Result Value Ref Range    Sodium 138 135 - 145 mmol/L    Potassium 3.8 3.6 - 5.5 mmol/L    Chloride 104 96 - 112 mmol/L    Co2 18 (L) 20 - 33  mmol/L    Anion Gap 16.0 7.0 - 16.0    Glucose 158 (H) 65 - 99 mg/dL    Bun 15 8 - 22 mg/dL    Creatinine 0.61 0.50 - 1.40 mg/dL    Calcium 9.5 8.5 - 10.5 mg/dL    Correct Calcium 9.3 8.5 - 10.5 mg/dL    AST(SGOT) 24 12 - 45 U/L    ALT(SGPT) 24 2 - 50 U/L    Alkaline Phosphatase 55 30 - 99 U/L    Total Bilirubin 0.4 0.1 - 1.5 mg/dL    Albumin 4.2 3.2 - 4.9 g/dL    Total Protein 7.2 6.0 - 8.2 g/dL    Globulin 3.0 1.9 - 3.5 g/dL    A-G Ratio 1.4 g/dL   LIPASE    Collection Time: 06/25/25  4:13 PM   Result Value Ref Range    Lipase 38 11 - 82 U/L   HCG QUAL SERUM    Collection Time: 06/25/25  4:13 PM   Result Value Ref Range    Beta-Hcg Qualitative Serum Negative Negative   ESTIMATED GFR    Collection Time: 06/25/25  4:13 PM   Result Value Ref Range    GFR (CKD-EPI) 122 >60 mL/min/1.73 m 2   BETA-HYDROXYBUTYRIC ACID    Collection Time: 06/25/25  4:13 PM   Result Value Ref Range    beta-Hydroxybutyric Acid 0.25 0.02 - 0.27 mmol/L   URINALYSIS,CULTURE IF INDICATED    Collection Time: 06/25/25  4:50 PM    Specimen: Blood   Result Value Ref Range    Color Yellow     Character Clear     Specific Gravity 1.036 <1.035    Ph 5.5 5.0 - 8.0    Glucose >=1000 (A) Negative mg/dL    Ketones Trace (A) Negative mg/dL    Protein Negative Negative mg/dL    Bilirubin Negative Negative    Urobilinogen, Urine 0.2 <=1.0 EU/dL    Nitrite Negative Negative    Leukocyte Esterase Negative Negative    Occult Blood Negative Negative    Micro Urine Req see below          I have independently interpreted this EKG    RADIOLOGY/PROCEDURES   I have independently interpreted the diagnostic imaging associated with this visit and am waiting the final reading from the radiologist.   My preliminary interpretation is as follows: No free fluid    Radiologist interpretation:  CT-ABDOMEN-PELVIS WITH   Final Result      1.  No acute findings.   2.  Normal appendix.   3.  Cholelithiasis.   4.  Enlarged fatty liver.             COURSE & MEDICAL DECISION  MAKING    ASSESSMENT, COURSE AND PLAN  Care Narrative: Patient presenting with intermittent left-sided abdominal pain.  Considered possibility of ovarian torsion but believe this be less likely given patient not having any ongoing lower quadrant pain.  Patient does not have any CVA tenderness does have some tenderness along left midline abdomen but no peritoneal signs.  Patient is declining analgesics.  After risk-benefit discussion patient agreeable to proceed with CT imaging to assess for acute surgical process.  Blood work obtained from triage per protocol shows mild metabolic acidosis and hyperglycemia.  Patient has normal anion gap, beta hydroxybutyrate is normal, unlikely DKA.  Urinalysis without convincing signs of infection or hematuria.    CT imaging without acute findings.  On reassessment patient has not had any recurrent pain.  Given patient is currently asymptomatic and believe ovarian torsion would be unlikely.  Patient was able to tolerate p.o. without difficulty.  Discussed with patient return precautions and primary care follow-up which she is comfortable with.        Hydration: Based on the patient's presentation of Dehydration the patient was given IV fluids. IV Hydration was used because oral hydration was not adequate alone. Upon recheck following hydration, the patient was improved.          ADDITIONAL PROBLEMS MANAGED      DISPOSITION AND DISCUSSIONS    FINAL DIAGNOSIS  1. Left lower quadrant abdominal pain         Electronically signed by: Arnie Dyer D.O., 6/25/2025 5:05 PM           [1]   Allergies  Allergen Reactions    Abilify Unspecified     Pt states that the medication affects her mobility, room starts spinning, unable to walk or use hands or feet, black outs    Norco [Apap-Fd&C Blue #1-Hydrocodone] Vomiting    Norco [Hydrocodone-Acetaminophen] Vomiting    Prazosin

## 2025-06-26 NOTE — DISCHARGE INSTRUCTIONS
If you have uncontrolled pain, vomiting please return to the emergency room.  Otherwise you should follow-up closely with your primary care provider.  You should take your prescribed medications.

## 2025-06-28 ENCOUNTER — APPOINTMENT (OUTPATIENT)
Dept: LAB | Facility: MEDICAL CENTER | Age: 30
End: 2025-06-28
Attending: FAMILY MEDICINE
Payer: COMMERCIAL

## 2025-07-04 ENCOUNTER — OFFICE VISIT (OUTPATIENT)
Dept: URGENT CARE | Facility: CLINIC | Age: 30
End: 2025-07-04
Payer: COMMERCIAL

## 2025-07-04 ENCOUNTER — PHARMACY VISIT (OUTPATIENT)
Dept: PHARMACY | Facility: MEDICAL CENTER | Age: 30
End: 2025-07-04
Payer: COMMERCIAL

## 2025-07-04 VITALS
TEMPERATURE: 97.8 F | OXYGEN SATURATION: 96 % | HEIGHT: 62 IN | WEIGHT: 212 LBS | DIASTOLIC BLOOD PRESSURE: 76 MMHG | BODY MASS INDEX: 39.01 KG/M2 | RESPIRATION RATE: 16 BRPM | SYSTOLIC BLOOD PRESSURE: 124 MMHG | HEART RATE: 84 BPM

## 2025-07-04 DIAGNOSIS — L03.032 PARONYCHIA OF GREAT TOE OF LEFT FOOT: ICD-10-CM

## 2025-07-04 DIAGNOSIS — E11.9 TYPE 2 DIABETES MELLITUS WITHOUT COMPLICATION, WITHOUT LONG-TERM CURRENT USE OF INSULIN (HCC): Primary | ICD-10-CM

## 2025-07-04 DIAGNOSIS — L03.032 CELLULITIS OF GREAT TOE OF LEFT FOOT: ICD-10-CM

## 2025-07-04 LAB
GLUCOSE BLD-MCNC: 137 MG/DL (ref 65–99)
POCT INT CON NEG: NEGATIVE
POCT INT CON POS: POSITIVE
POCT URINE PREGNANCY TEST: NEGATIVE

## 2025-07-04 PROCEDURE — RXMED WILLOW AMBULATORY MEDICATION CHARGE

## 2025-07-04 PROCEDURE — 3078F DIAST BP <80 MM HG: CPT

## 2025-07-04 PROCEDURE — 81025 URINE PREGNANCY TEST: CPT

## 2025-07-04 PROCEDURE — 99214 OFFICE O/P EST MOD 30 MIN: CPT

## 2025-07-04 PROCEDURE — 3074F SYST BP LT 130 MM HG: CPT

## 2025-07-04 PROCEDURE — 82962 GLUCOSE BLOOD TEST: CPT

## 2025-07-04 RX ORDER — DOXYCYCLINE HYCLATE 100 MG
100 TABLET ORAL 2 TIMES DAILY
Qty: 14 TABLET | Refills: 0 | Status: SHIPPED | OUTPATIENT
Start: 2025-07-04 | End: 2025-07-11

## 2025-07-04 RX ORDER — DULAGLUTIDE 1.5 MG/.5ML
INJECTION, SOLUTION SUBCUTANEOUS
COMMUNITY
Start: 2025-06-16

## 2025-07-04 RX ORDER — EMPAGLIFLOZIN AND METFORMIN HYDROCHLORIDE 12.5; 1 MG/1; MG/1
TABLET ORAL
COMMUNITY
Start: 2024-11-19 | End: 2025-11-14

## 2025-07-04 RX ORDER — IBUPROFEN 400 MG/1
400 TABLET, FILM COATED ORAL EVERY 8 HOURS PRN
Qty: 60 TABLET | Refills: 2 | Status: SHIPPED | OUTPATIENT
Start: 2025-07-04

## 2025-07-04 RX ORDER — HYDROXYZINE HYDROCHLORIDE 25 MG/1
TABLET, FILM COATED ORAL
COMMUNITY

## 2025-07-04 RX ORDER — ACETAMINOPHEN 500 MG
1000 TABLET ORAL EVERY 8 HOURS PRN
Qty: 60 TABLET | Refills: 2 | Status: SHIPPED | OUTPATIENT
Start: 2025-07-04

## 2025-07-04 RX ORDER — MUPIROCIN 2 %
1 OINTMENT (GRAM) TOPICAL 2 TIMES DAILY
Qty: 22 G | Refills: 0 | Status: SHIPPED | OUTPATIENT
Start: 2025-07-04

## 2025-07-04 ASSESSMENT — ENCOUNTER SYMPTOMS
DIARRHEA: 0
DIZZINESS: 0
MYALGIAS: 0
SORE THROAT: 0
PALPITATIONS: 0
CHILLS: 0
WHEEZING: 0
ABDOMINAL PAIN: 0
SHORTNESS OF BREATH: 0
VOMITING: 0
STRIDOR: 0
HEADACHES: 0
EYE DISCHARGE: 0
FEVER: 0
NAUSEA: 0
COUGH: 0
EYE REDNESS: 0
EYE PAIN: 0
SPUTUM PRODUCTION: 0

## 2025-07-04 ASSESSMENT — FIBROSIS 4 INDEX: FIB4 SCORE: 0.65

## 2025-07-04 NOTE — PROGRESS NOTES
Subjective:   Anisha Tristan is a 30 y.o. female who presents for Toe Pain (X 3 days/ L foot big toe/ hurts to walk )          I introduced myself to the patient and informed them that I am a Family Nurse Practitioner.    HPI:Anisha is a 30 year-old female with a history of DM2, PCOS, obesity, who  comes in today c/o pain, redness, swelling to left big toe. Onset was 3 days ago.  Patient describes symptoms as constant. They describe the pain as sharp, aching. Aggravating factors include weightbearing, mobilizing. Relieving factors include rest and offloading the area. Treatments tried at home include none. They describe their symptoms as moderate. She denies breastfeeding, but is sexually active and does not use contraception, states  she could be pregnant currently. States she does not routinely check her BS. Denies any F/C/N/V/D          Review of Systems   Constitutional:  Negative for chills, fever and malaise/fatigue.   HENT:  Negative for congestion, ear pain and sore throat.    Eyes:  Negative for pain, discharge and redness.   Respiratory:  Negative for cough, sputum production, shortness of breath, wheezing and stridor.    Cardiovascular:  Negative for chest pain and palpitations.   Gastrointestinal:  Negative for abdominal pain, diarrhea, nausea and vomiting.   Genitourinary:  Negative for dysuria.   Musculoskeletal:  Negative for myalgias.        Positive for pain, redness, swelling of left big toe   Skin:  Negative for rash.   Neurological:  Negative for dizziness and headaches.       Medications: reviewed with patient, updated med sheet    Allergies: Abilify, Norco [apap-fd&c blue #1-hydrocodone], Norco [hydrocodone-acetaminophen], and Prazosin    Problem List: does not have any pertinent problems on file.    Surgical History:  Past Surgical History:   Procedure Laterality Date    ORIF, ANKLE Right 2010    CARPAL TUNNEL RELEASE Bilateral     EYE SURGERY      OPEN REDUCTION         Past Social Hx:    "reports that she quit smoking about 4 years ago. Her smoking use included cigarettes. She started smoking about 19 years ago. She has a 3.8 pack-year smoking history. She has never used smokeless tobacco. She reports current alcohol use. She reports that she does not currently use drugs after having used the following drugs: Methamphetamines and Inhaled.     Past Family Hx:   family history includes Alcohol/Drug in her mother; Cancer in her maternal uncle and mother; Diabetes in her maternal grandmother; Hypertension in her maternal grandmother.     Problem list, medications, and allergies reviewed by myself today in Epic.   I have documented what I find to be significant in regards to past medical, social, family and surgical history  in my HPI or under PMH/PSH/FH review section, otherwise it is noncontributory     Objective:     /76   Pulse 84   Temp 36.6 °C (97.8 °F) (Temporal)   Resp 16   Ht 1.575 m (5' 2\")   Wt 96.2 kg (212 lb)   LMP 06/14/2025 (Approximate)   SpO2 96%   BMI 38.78 kg/m²     During this visit, appropriate PPE was worn, and hand hygiene was performed.    Physical Exam  Vitals reviewed.   Constitutional:       General: She is not in acute distress.     Appearance: Normal appearance. She is normal weight. She is not ill-appearing or toxic-appearing.   HENT:      Head: Normocephalic and atraumatic.      Right Ear: External ear normal.      Left Ear: External ear normal.      Nose: No congestion or rhinorrhea.   Eyes:      General: No scleral icterus.        Right eye: No discharge.         Left eye: No discharge.      Extraocular Movements: Extraocular movements intact.      Conjunctiva/sclera: Conjunctivae normal.   Cardiovascular:      Rate and Rhythm: Normal rate.   Pulmonary:      Effort: Pulmonary effort is normal.   Abdominal:      General: There is no distension.   Genitourinary:     Comments: Deferred  Musculoskeletal:         General: No swelling or tenderness. Normal range of " motion.      Right lower leg: No edema.      Left lower leg: No edema.        Feet:       Comments: There is erythema, swelling and TTP of the distal left hallux and medial nail fold.  There is no appreciable fluctuance or abscess.  Area is exquisitely TTP.  There are no obvious bony deformities or step-offs palpated.  Patient does have F ROM of MTP and DIP joints without pain.  NVID with cap refill to distal digit less than 3 seconds, sensation intact to hard and soft, radial pulse 2+.  Consistent with paronychia    Skin:     General: Skin is warm and dry.   Neurological:      General: No focal deficit present.      Mental Status: She is alert and oriented to person, place, and time. Mental status is at baseline.   Psychiatric:         Mood and Affect: Mood normal.         Behavior: Behavior normal.         Lab Results/POC Test Results   Results for orders placed or performed in visit on 07/04/25   POCT Glucose    Collection Time: 07/04/25  4:10 PM   Result Value Ref Range    Glucose - Accu-Ck 137 (A) 65 - 99 mg/dL   POCT Pregnancy    Collection Time: 07/04/25  4:15 PM   Result Value Ref Range    POC Urine Pregnancy Test Negative     Internal Control Positive Positive     Internal Control Negative Negative              Assessment/Plan:     Diagnosis and associated orders:     1. Type 2 diabetes mellitus without complication, without long-term current use of insulin ([facility])  POCT Glucose      2. Paronychia of great toe of left foot  doxycycline (VIBRAMYCIN) 100 MG Tab    mupirocin (BACTROBAN) 2 % Ointment    acetaminophen (TYLENOL) 500 MG Tab    ibuprofen (MOTRIN) 400 MG Tab    POCT Pregnancy      3. Cellulitis of great toe of left foot  doxycycline (VIBRAMYCIN) 100 MG Tab    acetaminophen (TYLENOL) 500 MG Tab    ibuprofen (MOTRIN) 400 MG Tab    POCT Pregnancy         Comments/MDM:     1. Paronychia of great toe of left foot  - doxycycline (VIBRAMYCIN) 100 MG Tab; Take 1 Tablet by mouth 2 times a day for 7 days.   Dispense: 14 Tablet; Refill: 0  - mupirocin (BACTROBAN) 2 % Ointment; Apply 1 Application topically 2 times a day.  Dispense: 22 g; Refill: 0  - acetaminophen (TYLENOL) 500 MG Tab; Take 2 Tablets by mouth every 8 hours as needed for Moderate Pain or Fever.  Dispense: 60 Tablet; Refill: 2  - ibuprofen (MOTRIN) 400 MG Tab; Take 1 Tablet by mouth every 8 hours as needed for Moderate Pain or Inflammation.  Dispense: 60 Tablet; Refill: 2  - POCT Pregnancy    2. Cellulitis of great toe of left foot  Patient's presentation and symptoms as well as physical exam are consistent with paronychia of left great toe medial nail fold and cellulitis of distal great toe.  Discussed with patient that there is no appreciable fluctuance or abscess, no drainage from the area, discussed with her treatment options including I&D attempt of the paronychia although I do not feel that this will produce much result given the lack of fluctuance to the area.  Alternatively we can watch and wait, start her on oral antibiotics and topical mupirocin, have her return to clinic if symptoms are worsening or abscesses developing.  She states she would like to forego the incision and drainage of the paronychia area today and would like to start with antibiotics first and watch and wait.  She states she understands the return to clinic precautions for worsening symptoms.  Will start her on doxycycline twice a day, pregnancy test was negative.  Will also start her on topical mupirocin twice a day  Instructed patient she may take Tylenol and ibuprofen for pain and inflammation, discussed appropriate dosages.  She requested I send these as prescriptions as she stated she does not have the money to buy these at the moment and thinks that Medicaid will cover them if they are sent as prescriptions.  Patient states that walking is painful, suggest we place her in a short hard soled orthopedic walking boot to help offload the hallux during the propulsion phase of  gait.  She is agreeable.  This was fitted by MA.  Patient refused crutches  discussed with patient Dx,  DDx, management options (risks,benefits, and alternatives to planned treatment), natural progression.   Supportive care measures were discussed.   Questions were encouraged and answered.   Written information was provided and I did go over this with the patient in clinic today.  Instructed patient regarding red flags and to return to urgent care prn if new or worsening sx or if there is no improvement in condition prn.    Advised the patient to follow-up with the primary care physician for recheck, reevaluation, and consideration of further management.  I did instruct patient regarding medications prescribed, purpose, side effects, precautions.  Instructed patient to get a pharmacy consult when picking up any prescribed medications.  Strict ER precautions discussed for any worsening symptoms, increased redness, swelling, pain, especially in the setting of fever, chills, nausea or vomiting, lethargy   Patient states they have good understanding and they are agreeable with the plan of care.     - doxycycline (VIBRAMYCIN) 100 MG Tab; Take 1 Tablet by mouth 2 times a day for 7 days.  Dispense: 14 Tablet; Refill: 0  - acetaminophen (TYLENOL) 500 MG Tab; Take 2 Tablets by mouth every 8 hours as needed for Moderate Pain or Fever.  Dispense: 60 Tablet; Refill: 2  - ibuprofen (MOTRIN) 400 MG Tab; Take 1 Tablet by mouth every 8 hours as needed for Moderate Pain or Inflammation.  Dispense: 60 Tablet; Refill: 2  - POCT Pregnancy    3. Type 2 diabetes mellitus without complication, without long-term current use of insulin ([facility]) (Primary)  Patient states she does not routinely check her blood sugars, discussed with her that secondary to infections, blood sugar can become elevated, suggest we get a random blood sugar in clinic today.  She is agreeable.  This is reasonable at 137.  Instructed patient to follow-up with PCP.  She  states she understands and is agreeable  - POCT Glucose           Pt is clinically stable at today's acute urgent care visit. Vital signs are normal and reassuring.  No acute distress noted. There was no immediate clinical indication for the necessity of emergency department evaluation or inpatient admission.  Appropriate for outpatient management at this time. Patient was amendable to a trial of outpatient management.        I personally reviewed prior external notes and test results pertinent to today's visit.  I have independently reviewed and interpreted all diagnostics ordered during this urgent care acute visit.        Please note that this dictation was created using voice recognition software. I have made a reasonable attempt to correct obvious errors, but I expect that there are errors of grammar and possibly content that I did not discover before finalizing the note.    This note was electronically signed by Sheng TO, LEONARD, LISA, TG

## 2025-07-04 NOTE — PATIENT INSTRUCTIONS
Tylenol 1000 mg and ibuprofen 400 mg , take together every 8 hours  (Max tylenol 3000 mg/24 hours, max ibuprofen 1200 mg in 24/hrs)

## 2025-07-08 ENCOUNTER — HOSPITAL ENCOUNTER (OUTPATIENT)
Dept: LAB | Facility: MEDICAL CENTER | Age: 30
End: 2025-07-08
Attending: FAMILY MEDICINE
Payer: COMMERCIAL

## 2025-07-08 DIAGNOSIS — Z00.6 CLINICAL TRIAL PARTICIPANT: ICD-10-CM

## 2025-07-19 LAB
APOB+LDLR+PCSK9 GENE MUT ANL BLD/T: NOT DETECTED
BRCA1+BRCA2 DEL+DUP + FULL MUT ANL BLD/T: NOT DETECTED
MLH1+MSH2+MSH6+PMS2 GN DEL+DUP+FUL M: NOT DETECTED

## 2025-07-21 ENCOUNTER — RESULTS FOLLOW-UP (OUTPATIENT)
Dept: MEDICAL GROUP | Facility: PHYSICIAN GROUP | Age: 30
End: 2025-07-21
Payer: COMMERCIAL

## 2025-07-22 ENCOUNTER — HOSPITAL ENCOUNTER (EMERGENCY)
Facility: MEDICAL CENTER | Age: 30
End: 2025-07-23
Attending: EMERGENCY MEDICINE
Payer: COMMERCIAL

## 2025-07-22 DIAGNOSIS — R06.02 SHORTNESS OF BREATH: Primary | ICD-10-CM

## 2025-07-22 DIAGNOSIS — T67.9XXA HEAT EXPOSURE, INITIAL ENCOUNTER: ICD-10-CM

## 2025-07-22 DIAGNOSIS — F41.9 ANXIETY: ICD-10-CM

## 2025-07-22 LAB
ALBUMIN SERPL BCP-MCNC: 4.2 G/DL (ref 3.2–4.9)
ALBUMIN/GLOB SERPL: 1.4 G/DL
ALP SERPL-CCNC: 51 U/L (ref 30–99)
ALT SERPL-CCNC: 28 U/L (ref 2–50)
ANION GAP SERPL CALC-SCNC: 14 MMOL/L (ref 7–16)
APPEARANCE UR: CLEAR
AST SERPL-CCNC: 25 U/L (ref 12–45)
BACTERIA #/AREA URNS HPF: ABNORMAL /HPF
BASOPHILS # BLD AUTO: 0.3 % (ref 0–1.8)
BASOPHILS # BLD: 0.02 K/UL (ref 0–0.12)
BILIRUB SERPL-MCNC: 0.4 MG/DL (ref 0.1–1.5)
BILIRUB UR QL STRIP.AUTO: NEGATIVE
BUN SERPL-MCNC: 11 MG/DL (ref 8–22)
CALCIUM ALBUM COR SERPL-MCNC: 9.4 MG/DL (ref 8.5–10.5)
CALCIUM SERPL-MCNC: 9.6 MG/DL (ref 8.5–10.5)
CASTS URNS QL MICRO: ABNORMAL /LPF (ref 0–2)
CHLORIDE SERPL-SCNC: 103 MMOL/L (ref 96–112)
CO2 SERPL-SCNC: 20 MMOL/L (ref 20–33)
COLOR UR: YELLOW
CREAT SERPL-MCNC: 0.66 MG/DL (ref 0.5–1.4)
EOSINOPHIL # BLD AUTO: 0.05 K/UL (ref 0–0.51)
EOSINOPHIL NFR BLD: 0.7 % (ref 0–6.9)
EPITHELIAL CELLS 1715: ABNORMAL /HPF (ref 0–5)
ERYTHROCYTE [DISTWIDTH] IN BLOOD BY AUTOMATED COUNT: 45.1 FL (ref 35.9–50)
GFR SERPLBLD CREATININE-BSD FMLA CKD-EPI: 119 ML/MIN/1.73 M 2
GLOBULIN SER CALC-MCNC: 2.9 G/DL (ref 1.9–3.5)
GLUCOSE SERPL-MCNC: 110 MG/DL (ref 65–99)
GLUCOSE UR STRIP.AUTO-MCNC: >=1000 MG/DL
HCG SERPL QL: NEGATIVE
HCT VFR BLD AUTO: 42.1 % (ref 37–47)
HGB BLD-MCNC: 14.2 G/DL (ref 12–16)
IMM GRANULOCYTES # BLD AUTO: 0.03 K/UL (ref 0–0.11)
IMM GRANULOCYTES NFR BLD AUTO: 0.4 % (ref 0–0.9)
KETONES UR STRIP.AUTO-MCNC: ABNORMAL MG/DL
LEUKOCYTE ESTERASE UR QL STRIP.AUTO: NEGATIVE
LIPASE SERPL-CCNC: 42 U/L (ref 11–82)
LYMPHOCYTES # BLD AUTO: 2.34 K/UL (ref 1–4.8)
LYMPHOCYTES NFR BLD: 34 % (ref 22–41)
MCH RBC QN AUTO: 30.7 PG (ref 27–33)
MCHC RBC AUTO-ENTMCNC: 33.7 G/DL (ref 32.2–35.5)
MCV RBC AUTO: 90.9 FL (ref 81.4–97.8)
MICRO URNS: ABNORMAL
MONOCYTES # BLD AUTO: 0.35 K/UL (ref 0–0.85)
MONOCYTES NFR BLD AUTO: 5.1 % (ref 0–13.4)
NEUTROPHILS # BLD AUTO: 4.09 K/UL (ref 1.82–7.42)
NEUTROPHILS NFR BLD: 59.5 % (ref 44–72)
NITRITE UR QL STRIP.AUTO: NEGATIVE
NRBC # BLD AUTO: 0 K/UL
NRBC BLD-RTO: 0 /100 WBC (ref 0–0.2)
PH UR STRIP.AUTO: 6.5 [PH] (ref 5–8)
PLATELET # BLD AUTO: 265 K/UL (ref 164–446)
PMV BLD AUTO: 10.2 FL (ref 9–12.9)
POTASSIUM SERPL-SCNC: 3.9 MMOL/L (ref 3.6–5.5)
PROT SERPL-MCNC: 7.1 G/DL (ref 6–8.2)
PROT UR QL STRIP: NEGATIVE MG/DL
RBC # BLD AUTO: 4.63 M/UL (ref 4.2–5.4)
RBC # URNS HPF: ABNORMAL /HPF (ref 0–2)
RBC UR QL AUTO: ABNORMAL
SODIUM SERPL-SCNC: 137 MMOL/L (ref 135–145)
SP GR UR STRIP.AUTO: 1.02
UROBILINOGEN UR STRIP.AUTO-MCNC: 0.2 EU/DL
WBC # BLD AUTO: 6.9 K/UL (ref 4.8–10.8)
WBC #/AREA URNS HPF: ABNORMAL /HPF

## 2025-07-22 PROCEDURE — 93005 ELECTROCARDIOGRAM TRACING: CPT | Mod: TC

## 2025-07-22 PROCEDURE — 83690 ASSAY OF LIPASE: CPT

## 2025-07-22 PROCEDURE — 85025 COMPLETE CBC W/AUTO DIFF WBC: CPT

## 2025-07-22 PROCEDURE — 80053 COMPREHEN METABOLIC PANEL: CPT

## 2025-07-22 PROCEDURE — 93005 ELECTROCARDIOGRAM TRACING: CPT | Mod: TC | Performed by: EMERGENCY MEDICINE

## 2025-07-22 PROCEDURE — 84703 CHORIONIC GONADOTROPIN ASSAY: CPT

## 2025-07-22 PROCEDURE — 81001 URINALYSIS AUTO W/SCOPE: CPT

## 2025-07-22 ASSESSMENT — FIBROSIS 4 INDEX: FIB4 SCORE: 0.65

## 2025-07-22 NOTE — Clinical Note
Anisha Tristan was seen and treated in our emergency department on 7/22/2025.  She may return to work on 07/24/2025.       If you have any questions or concerns, please don't hesitate to call.      Connie Jennings M.D.

## 2025-07-23 VITALS
DIASTOLIC BLOOD PRESSURE: 65 MMHG | OXYGEN SATURATION: 95 % | WEIGHT: 212.96 LBS | TEMPERATURE: 96.4 F | SYSTOLIC BLOOD PRESSURE: 107 MMHG | RESPIRATION RATE: 15 BRPM | HEART RATE: 75 BPM | BODY MASS INDEX: 39.19 KG/M2 | HEIGHT: 62 IN

## 2025-07-23 LAB — EKG IMPRESSION: NORMAL

## 2025-07-23 PROCEDURE — 99284 EMERGENCY DEPT VISIT MOD MDM: CPT

## 2025-07-23 PROCEDURE — 36415 COLL VENOUS BLD VENIPUNCTURE: CPT

## 2025-07-23 NOTE — ED PROVIDER NOTES
Problem:  ([place],NICU)  Goal: Signs and Symptoms of Listed Potential Problems Will be Absent, Minimized or Managed ([place])  Outcome: Ongoing (interventions implemented as appropriate)  Flowsheets (Taken 2020 0214)  Problems Assessed ([place]): all  Problems Present ([place]): none     Problem: Patient Care Overview  Goal: Plan of Care Review  Outcome: Ongoing (interventions implemented as appropriate)  Flowsheets  Taken 2020 0214  Progress: improving  Outcome Summary: Mother with increased temp @ delivery; Infant had apenic episode in [place] (see flowsheets), also had a destat episode to the 70s (see flowsheets) Bradypeneic, hypotonic; CBC ordered and ok; Infant breastfeeding well and shows interest; Upon assessment infant seems to not show signs of distress and is comfortable; DTV and DTS; Q4H vitals per protocol for chorio; POC discussed with parents; Questions and concerns addressed; Reassurance provided; Will contunie to monitor  Taken 2020 0150  Care Plan Reviewed With: mother;father  Goal: Individualization and Mutuality  Outcome: Ongoing (interventions implemented as appropriate)  Goal: Discharge Needs Assessment  Outcome: Ongoing (interventions implemented as appropriate)  Flowsheets (Taken 2020 0214)  Transportation Concerns: car, none  Concerns to be Addressed: no discharge needs identified  Readmission Within the Last 30 Days: no previous admission in last 30 days  Patient/Family Anticipated Services at Transition: none  Patient/Family Anticipates Transition to: home with family  Goal: Interprofessional Rounds/Family Conf  Outcome: Ongoing (interventions implemented as appropriate)  Flowsheets (Taken 2020 0214)  Participants: family; nursing      ED Provider Note    CHIEF COMPLAINT  Chief Complaint   Patient presents with    Abdominal Pain     Mid abd pain and moves up into chest a little bit on the way here     Dizziness     Lightheaded     Shortness of Breath     Feeling harder to breathing. Pt EKG performed in triage        EXTERNAL RECORDS REVIEWED  Outpatient Notes patient has a history of diabetes asthma    HPI/ROS  LIMITATION TO HISTORY   Select: : None  OUTSIDE HISTORIAN(S):  conner    Anisha Tristan is a 30 y.o. female who presents to the emergency department thinking that she has heatstroke.  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  She started feeling lightheaded and sweaty and then had a little bit of like palpitations and mild discomfort in her chest.  She states that then she had a panic attack on the way here because she was still feeling lightheaded.  But since resting in a cool place she feels significantly better.  No nausea no vomiting no unilateral weakness numbness or tingling or bilateral unilateral leg swelling.  She feels a lot better and she wishes to go home.  She does vape but does not smoke cigarettes    PAST MEDICAL HISTORY   has a past medical history of ASTHMA, Asthma, Carpal tunnel syndrome, Diabetes (HCC), Hyperlipidemia, Morbid obesity (HCC), PCOS (polycystic ovarian syndrome), and Vitamin D deficiency.    SURGICAL HISTORY   has a past surgical history that includes eye surgery; open reduction; carpal tunnel release (Bilateral); and orif, ankle (Right, 2010).    FAMILY HISTORY  Family History   Problem Relation Age of Onset    Cancer Mother         throat    Alcohol/Drug Mother     Cancer Maternal Uncle     Diabetes Maternal Grandmother     Hypertension Maternal Grandmother     Heart Disease Neg Hx     Stroke Neg Hx        SOCIAL HISTORY  Social History     Tobacco Use    Smoking status: Former     Current packs/day: 0.00     Average packs/day: 0.3 packs/day for 15.0 years  (3.8 ttl pk-yrs)     Types: Cigarettes     Start date: 2/15/2006     Quit date: 2/15/2021     Years since quittin.4    Smokeless tobacco: Never   Vaping Use    Vaping status: Every Day    Substances: Nicotine, Flavoring    Devices: Disposable   Substance and Sexual Activity    Alcohol use: Yes     Comment: occ.    Drug use: Not Currently     Types: Methamphetamines, Inhaled     Comment: history of meth from 2529-2581, no IV, one use a couple months ago     Sexual activity: Yes     Partners: Male     Birth control/protection: Condom       CURRENT MEDICATIONS  Home Medications       Reviewed by Eleni Copeland R.N. (Registered Nurse) on 25 at 2220  Med List Status: <None>     Medication Last Dose Status   acetaminophen (TYLENOL) 500 MG Tab  Active   albuterol (PROVENTIL HFA) 108 (90 Base) MCG/ACT Aero Soln inhalation aerosol  Active   atorvastatin (LIPITOR) 10 MG Tab  Active   Blood Glucose Monitoring Suppl (BLOOD GLUCOSE MONITOR SYSTEM) w/Device Kit  Active   citalopram (CELEXA) 20 MG Tab  Active   dicyclomine (BENTYL) 20 MG Tab  Active   famotidine (PEPCID) 20 MG Tab  Active   fenofibrate micronized (LOFIBRA) 67 MG capsule  Active   glucose blood strip  Active   hydrocortisone 2.5 % Cream topical cream  Active   hydrOXYzine HCl (ATARAX) 25 MG Tab  Active   ibuprofen (MOTRIN) 400 MG Tab  Active   Insulin Pen Needle 32 G x 4 mm  Active   Lancets  Active   lidocaine (LIDODERM) 5 % Patch  Active   liraglutide (VICTOZA) 18 MG/3ML Solution Pen-injector  Active   loperamide (IMODIUM) 2 MG Cap  Active   mupirocin (BACTROBAN) 2 % Ointment  Active   norelgestromin-ethinyl estradiol (ORTHO EVRA) 150-35 MCG/24HR patch  Active   ondansetron (ZOFRAN ODT) 4 MG TABLET DISPERSIBLE  Active   ondansetron (ZOFRAN ODT) 4 MG TABLET DISPERSIBLE  Active   prenatal vitamin with Fe/FA (SANDOR PRENATAL) 28-0.8 MG Tab  Active   SYNJARDY 12.5-1000 MG Tab  Active   TRULICITY 1.5 MG/0.5ML Solution Auto-injector  Active               "      ALLERGIES  Allergies[1]    PHYSICAL EXAM  VITAL SIGNS: /68   Pulse 81   Temp (!) 35.8 °C (96.4 °F) (Temporal)   Resp 12   Ht 1.575 m (5' 2\")   Wt 96.6 kg (212 lb 15.4 oz)   LMP 2025 (Approximate)   SpO2 98%   BMI 38.95 kg/m²    Pulse OX: Pulse Oxygen level is within normal limits on room air  Constitutional: Alert in no apparent distress.  HENT: Normocephalic, Atraumatic, MMM  Eyes: PERound. Conjunctiva normal, non-icteric.   Heart: Regular rate and rhythm, intact distal pulses no murmurs rubs or gallop  Lungs: Symmetrical movement, no resp distress clear to auscultation bilaterally  Abdomen: Non-tender, non-distended, normal bowel sounds  EXT/Back no edema  Skin: Warm, Dry, No erythema, No rash.   Neurologic: Alert and oriented, Grossly non-focal.       EKG/LABS  Labs Reviewed   COMP METABOLIC PANEL - Abnormal; Notable for the following components:       Result Value    Glucose 110 (*)     All other components within normal limits   URINALYSIS,CULTURE IF INDICATED - Abnormal; Notable for the following components:    Glucose >=1000 (*)     Ketones Trace (*)     Occult Blood Small (*)     All other components within normal limits   URINE MICROSCOPIC (W/UA) - Abnormal; Notable for the following components:    RBC 11-20 (*)     Bacteria Few (*)     All other components within normal limits   CBC WITH DIFFERENTIAL   LIPASE   HCG QUAL SERUM   ESTIMATED GFR     Results for orders placed or performed during the hospital encounter of 25   EKG (NOW)   Result Value Ref Range    Report       [facility] Emergency Dept.    Test Date:  2025  Pt Name:    LEXI NUÑEZ                 Department: ER  MRN:        [id number]                      Room:  Gender:     Female                       Technician: [id number]  :        1995                   Requested By:ER TRIAGE PROTOCOL  Order #:    [id number]                    Reading MD: Connie Jennings MD    Measurements  Intervals "                                Axis  Rate:       73                           P:          3  RI:         175                          QRS:        61  QRSD:       83                           T:          31  QT:         389  QTc:        429    Interpretive Statements  Sinus rhythm At a rate of 73 no ST elevations or ST depressions no abnormal T  wave inversions or Q waves normal intervals normal axis  No previous ECG available for comparison  Electronically Signed On 07- 00:00:31 PDT by Connie Jennings MD         I have independently interpreted this EKG        COURSE & MEDICAL DECISION MAKING    ASSESSMENT, COURSE AND PLAN/  DISPOSITION AND DISCUSSIONS  Care Narrative:     Patient is a 30-year-old female who presents to the emergency department with an episode of feeling unwell.  Palpitations some chest discomfort and then anxiety.  Otherwise she is well-appearing at this time EKG is unremarkable labs that were done are unremarkable.  She is PERC negative I have low concern for pulmonary embolism low concern for ACS she states at baseline she does not bring chest pain or shortness of breath with exertion.  She is not pregnant she otherwise is well-appearing and feels comfortable wishes to go home.  We discussed plenty of fluids hydration and return precautions for new worsening issues and she feels comfortable being discharged.    Heart score 1    I have discussed management of the patient with the following physicians and ISIS's:  none    Discussion of management with other QHP or appropriate source(s): None     Escalation of care considered, and ultimately not performed:acute inpatient care management, however at this time, the patient is most appropriate for outpatient management    Barriers to care at this time, including but not limited to: na.     Decision tools and prescription drugs considered including, but not limited to: PERC rule negative, heart score 1.    The patient will return for new or  worsening symptoms and is stable at the time of discharge.    The patient is referred to a primary physician for blood pressure management, diabetic screening, and for all other preventative health concerns.    DISPOSITION:  Patient will be discharged home in stable condition.    FOLLOW UP:  Bri Cox M.D.  [facility] [place] [address]  [phone]    Schedule an appointment as soon as possible for a visit         OUTPATIENT MEDICATIONS:  Discharge Medication List as of 7/23/2025 12:04 AM            FINAL DIAGNOSIS  1. Shortness of breath    2. Heat exposure, initial encounter    3. Anxiety         Electronically signed by: Connie Jennings M.D., 7/23/2025 12:00 AM           [1]   Allergies  Allergen Reactions    Abilify Unspecified     Pt states that the medication affects her mobility, room starts spinning, unable to walk or use hands or feet, black outs    Norco [Apap-Fd&C Blue #1-Hydrocodone] Vomiting    Norco [Hydrocodone-Acetaminophen] Vomiting    Prazosin

## 2025-07-23 NOTE — ED NOTES
DC home with written and verbal instructions regarding f/u.  Verbalized understanding, ambulated out.

## 2025-07-23 NOTE — ED TRIAGE NOTES
Chief Complaint   Patient presents with    Abdominal Pain     Mid abd pain and moves up into chest a little bit on the way here     Dizziness     Lightheaded     Shortness of Breath     Feeling harder to breathing. Pt EKG performed in triage      Ambulatory to triage for above pt drove self here.

## 2025-07-29 ENCOUNTER — PHARMACY VISIT (OUTPATIENT)
Dept: PHARMACY | Facility: MEDICAL CENTER | Age: 30
End: 2025-07-29
Payer: COMMERCIAL

## 2025-07-29 ENCOUNTER — OFFICE VISIT (OUTPATIENT)
Dept: URGENT CARE | Facility: CLINIC | Age: 30
End: 2025-07-29
Payer: COMMERCIAL

## 2025-07-29 VITALS
BODY MASS INDEX: 39.38 KG/M2 | SYSTOLIC BLOOD PRESSURE: 106 MMHG | WEIGHT: 214 LBS | HEART RATE: 90 BPM | OXYGEN SATURATION: 95 % | DIASTOLIC BLOOD PRESSURE: 62 MMHG | TEMPERATURE: 97.7 F | HEIGHT: 62 IN | RESPIRATION RATE: 14 BRPM

## 2025-07-29 DIAGNOSIS — R11.0 NAUSEA: ICD-10-CM

## 2025-07-29 DIAGNOSIS — T50.905A MEDICATION REACTION, INITIAL ENCOUNTER: Primary | ICD-10-CM

## 2025-07-29 PROCEDURE — RXMED WILLOW AMBULATORY MEDICATION CHARGE

## 2025-07-29 RX ORDER — DAPAGLIFLOZIN AND METFORMIN HYDROCHLORIDE 5; 1000 MG/1; MG/1
TABLET, FILM COATED, EXTENDED RELEASE ORAL
COMMUNITY
Start: 2025-07-23

## 2025-07-29 RX ORDER — ONDANSETRON 4 MG/1
4 TABLET, FILM COATED ORAL EVERY 4 HOURS PRN
Qty: 20 TABLET | Refills: 0 | Status: SHIPPED | OUTPATIENT
Start: 2025-07-29

## 2025-07-29 ASSESSMENT — FIBROSIS 4 INDEX: FIB4 SCORE: 0.57

## 2025-07-29 ASSESSMENT — ENCOUNTER SYMPTOMS: NAUSEA: 1

## 2025-07-29 NOTE — LETTER
July 29, 2025         Patient: Anisha Tristan   YOB: 1995   Date of Visit: 7/29/2025           To Whom it May Concern:    Ansiha Tristan was seen in my clinic on 7/29/2025.  Please excuse any dates she has missed.  She may return to work on 8/3/25 or sooner if she feels better.    If you have any questions or concerns, please don't hesitate to call.        Sincerely,           ESSIE Shelton.  Electronically Signed

## 2025-07-30 NOTE — PROGRESS NOTES
"Subjective:   Anisha Tristan is a 30 y.o. female     Patient presents to the clinic for complaints of concern with possible medication reaction/side effects since earlier today.  Since earlier today, patient has been experiencing nausea and fatigue.  Nausea is off and on, mild, and without vomiting. No abdominal pain.   Patient was recently switched from synjardy to xigduo. Patient took her first dose prior to experiencing the symptoms.   Patient requesting work note.  Has taken no meds for her symptoms.   Patient denies chest pain, SOB, dizziness/lightheadedness/vertigo, vomiting/diarrhea, difficulty breathing or swallowing, palpitations or racing heart rate, fever, chills, body aches, cough, weakness, or sore throat.      Nausea  Associated symptoms include nausea.       Review of Systems   Gastrointestinal:  Positive for nausea.     Refer to HPI for additional details.    During this visit, appropriate PPE was worn, and hand hygiene was performed.    PMH:  has a past medical history of ASTHMA, Asthma, Carpal tunnel syndrome, Diabetes (HCC), Hyperlipidemia, Morbid obesity (HCC), PCOS (polycystic ovarian syndrome), and Vitamin D deficiency.    MEDS: Current Medications[1]    ALLERGIES: Allergies[2]  SURGHX: Past Surgical History[3]  SOCHX:  reports that she quit smoking about 4 years ago. Her smoking use included cigarettes. She started smoking about 19 years ago. She has a 3.8 pack-year smoking history. She has never used smokeless tobacco. She reports current alcohol use. She reports that she does not currently use drugs after having used the following drugs: Methamphetamines and Inhaled.    FH: Per HPI as applicable/pertinent.    Medications, Allergies, and current problem list reviewed today in Epic.     Objective:     /62 (BP Location: Right arm, Patient Position: Sitting, BP Cuff Size: Adult)   Pulse 90   Temp 36.5 °C (97.7 °F) (Temporal)   Resp 14   Ht 1.575 m (5' 2\")   Wt 97.1 kg (214 lb)   " SpO2 95%     Physical Exam  Vitals and nursing note reviewed.   Constitutional:       Appearance: She is ill-appearing. She is not toxic-appearing.   HENT:      Head: Normocephalic.      Right Ear: Tympanic membrane, ear canal and external ear normal.      Left Ear: Tympanic membrane, ear canal and external ear normal.      Nose: No congestion or rhinorrhea.      Mouth/Throat:      Mouth: Mucous membranes are moist.      Pharynx: No oropharyngeal exudate or posterior oropharyngeal erythema.   Eyes:      Extraocular Movements: Extraocular movements intact.      Conjunctiva/sclera: Conjunctivae normal.   Cardiovascular:      Rate and Rhythm: Normal rate and regular rhythm.      Pulses: Normal pulses.      Heart sounds: Normal heart sounds.   Pulmonary:      Effort: Pulmonary effort is normal.   Abdominal:      General: Abdomen is flat. Bowel sounds are normal. There is no distension.      Palpations: Abdomen is soft. There is no mass.      Tenderness: There is no abdominal tenderness. There is no right CVA tenderness, left CVA tenderness, guarding or rebound.      Hernia: No hernia is present.   Musculoskeletal:      Cervical back: No rigidity.   Lymphadenopathy:      Cervical: No cervical adenopathy.   Skin:     General: Skin is warm and dry.      Capillary Refill: Capillary refill takes less than 2 seconds.      Coloration: Skin is not jaundiced or pale.   Neurological:      General: No focal deficit present.      Mental Status: She is alert and oriented to person, place, and time.   Psychiatric:         Mood and Affect: Mood normal.         Behavior: Behavior normal.         Thought Content: Thought content normal.         Judgment: Judgment normal.         Assessment/Plan:     Diagnosis and associated orders:     1. Medication reaction, initial encounter    2. Nausea  - ondansetron (ZOFRAN) 4 MG Tab tablet; Take 1 Tablet by mouth every four hours as needed for Nausea/Vomiting.  Dispense: 20 Tablet; Refill:  0    Other orders  - XIGDUO XR 5-1000 MG TABLET SR 24 HR     Comments/MDM:     Discussed that likely etiology of the patient's symptoms is medication reaction to her new diabetes med.  Instructed patient to immediately stop taking her Xigduo and to contact her PCP regarding this intolerance to this new medication.  Patient states she already made an appointment on 7/31 for her PCP and just needs a work note for the next 2 days until her appointment.  Patient agreeable to this plan of care.  All questions answered.  Return to clinic if symptoms persist or worsen.  Red flag symptoms warranting emergency medical services discussed, including but not limited to chest pain, SOB, wheezing, difficulty breathing or swallowing, sensation of throat closing or mass in the throat, severe intractable headache, changes to vision or hearing, palpitations or racing heart rate, abdominal pain, fever greater than 103F despite medication management, dizziness/lightheadedness/vertigo, or vomiting/diarrhea.           Differential diagnosis, natural history, supportive care, and indications for immediate follow-up discussed.    Advised the patient to follow-up with the primary care physician for recheck, reevaluation, and consideration of further management.    Instructed patient to seek emergency medical attention via calling EMS or going to the Emergency Room for red flag symptoms, including but not limited to: chest pain, palpitations, fever greater than 103F, shortness of breath, wheezing, new or worsened numbness/tingling, focal or unilateral weakness, and bloody vomit/stool.     Please note that this dictation was created using voice recognition software. I have made a reasonable attempt to correct obvious errors, but I expect that there are errors of grammar and possibly content that I did not discover before finalizing the note.    This note was electronically signed by HAYLEE Shelton             [1]   Current  Outpatient Medications:     XIGDUO XR 5-1000 MG TABLET SR 24 HR, , Disp: , Rfl:     ondansetron (ZOFRAN) 4 MG Tab tablet, Take 1 Tablet by mouth every four hours as needed for Nausea/Vomiting., Disp: 20 Tablet, Rfl: 0    TRULICITY 1.5 MG/0.5ML Solution Auto-injector, , Disp: , Rfl:     hydrOXYzine HCl (ATARAX) 25 MG Tab, TAKE ONE TABLET BY MOUTH DAILY AT BEDTIME AS NEEDED FOR INSOMNIA, MAY REPEAT DOSE IF NEEDED; Duration: 30, Disp: , Rfl:     mupirocin (BACTROBAN) 2 % Ointment, Apply 1 Application topically 2 times a day., Disp: 22 g, Rfl: 0    ibuprofen (MOTRIN) 400 MG Tab, Take 1 Tablet by mouth every 8 hours as needed for Moderate Pain or Inflammation., Disp: 60 Tablet, Rfl: 2    famotidine (PEPCID) 20 MG Tab, Take 1 tablet by mouth twice daily, Disp: 60 Tablet, Rfl: 5    prenatal vitamin with Fe/FA (SANDOR PRENATAL) 28-0.8 MG Tab, Take 1 tablet by mouth once daily, Disp: 30 Tablet, Rfl: 5    Lancets, Use one to check glucoses daily morning before breakfast, Disp: 100 Each, Rfl: 5    citalopram (CELEXA) 20 MG Tab, Take 1.5 Tablets by mouth every day., Disp: 45 Tablet, Rfl: 5    Insulin Pen Needle 32 G x 4 mm, Use 1 Each every day., Disp: 100 Each, Rfl: 2    Blood Glucose Monitoring Suppl (BLOOD GLUCOSE MONITOR SYSTEM) w/Device Kit, Test blood sugar as recommended by provider., Disp: 1 Kit, Rfl: 0    glucose blood strip, Test glucose daily in the morning fasting, Disp: 100 Strip, Rfl: 5    albuterol (PROVENTIL HFA) 108 (90 Base) MCG/ACT Aero Soln inhalation aerosol, Inhale 2 Puffs every 6 hours as needed for Shortness of Breath., Disp: 8.5 g, Rfl: 5    hydrocortisone 2.5 % Cream topical cream, Apply thin layer to areas of eczema daily as needed, Disp: 30 g, Rfl: 3    SYNJARDY 12.5-1000 MG Tab, 1 tablet once daily x 7 days then 1 tablet twice daily from then on Orally; Duration: 30 days (Patient not taking: Reported on 7/29/2025), Disp: , Rfl:     acetaminophen (TYLENOL) 500 MG Tab, Take 2 Tablets by mouth every 8  hours as needed for Moderate Pain or Fever. (Patient not taking: Reported on 7/29/2025), Disp: 60 Tablet, Rfl: 2    ondansetron (ZOFRAN ODT) 4 MG TABLET DISPERSIBLE, Take 1 Tablet by mouth every 8 hours as needed for Nausea/Vomiting. (Patient not taking: Reported on 7/29/2025), Disp: 10 Tablet, Rfl: 1    liraglutide (VICTOZA) 18 MG/3ML Solution Pen-injector, Inject 1.2 mg under the skin every day. (Patient not taking: Reported on 7/29/2025), Disp: 6 mL, Rfl: 5    norelgestromin-ethinyl estradiol (ORTHO EVRA) 150-35 MCG/24HR patch, Place 1 Patch on the skin every 7 days. (Patient not taking: Reported on 7/29/2025), Disp: 9 Patch, Rfl: 3    ondansetron (ZOFRAN ODT) 4 MG TABLET DISPERSIBLE, Take 1 Tablet by mouth every 6 hours as needed for Nausea/Vomiting. (Patient not taking: Reported on 7/29/2025), Disp: 30 Tablet, Rfl: 1    lidocaine (LIDODERM) 5 % Patch, Place 1 Patch on the skin every 24 hours. (Patient not taking: Reported on 7/29/2025), Disp: 10 Patch, Rfl: 0    atorvastatin (LIPITOR) 10 MG Tab, Take 1 Tablet by mouth every evening. (Patient not taking: Reported on 7/29/2025), Disp: 30 Tablet, Rfl: 5    fenofibrate micronized (LOFIBRA) 67 MG capsule, Take 1 Capsule by mouth every morning before breakfast. (Patient not taking: Reported on 7/29/2025), Disp: 30 Capsule, Rfl: 5    dicyclomine (BENTYL) 20 MG Tab, Take 1 Tablet by mouth 3 times a day before meals. (Patient not taking: Reported on 7/29/2025), Disp: 30 Tablet, Rfl: 5    loperamide (IMODIUM) 2 MG Cap, Take 1 Capsule by mouth 1 time a day as needed for Diarrhea. (Patient not taking: Reported on 7/29/2025), Disp: 30 Capsule, Rfl: 2  [2]   Allergies  Allergen Reactions    Abilify Unspecified     Pt states that the medication affects her mobility, room starts spinning, unable to walk or use hands or feet, black outs    Norco [Apap-Fd&C Blue #1-Hydrocodone] Vomiting    Norco [Hydrocodone-Acetaminophen] Vomiting    Prazosin    [3]   Past Surgical  History:  Procedure Laterality Date    ORIF, ANKLE Right 2010    CARPAL TUNNEL RELEASE Bilateral     EYE SURGERY      OPEN REDUCTION

## 2025-08-17 ENCOUNTER — OFFICE VISIT (OUTPATIENT)
Dept: URGENT CARE | Facility: CLINIC | Age: 30
End: 2025-08-17
Payer: COMMERCIAL

## 2025-08-17 ENCOUNTER — PHARMACY VISIT (OUTPATIENT)
Dept: PHARMACY | Facility: MEDICAL CENTER | Age: 30
End: 2025-08-17
Payer: COMMERCIAL

## 2025-08-17 VITALS
TEMPERATURE: 97.8 F | DIASTOLIC BLOOD PRESSURE: 76 MMHG | OXYGEN SATURATION: 98 % | BODY MASS INDEX: 39.56 KG/M2 | RESPIRATION RATE: 18 BRPM | HEIGHT: 62 IN | SYSTOLIC BLOOD PRESSURE: 98 MMHG | WEIGHT: 215 LBS | HEART RATE: 100 BPM

## 2025-08-17 DIAGNOSIS — M54.41 ACUTE RIGHT-SIDED LOW BACK PAIN WITH RIGHT-SIDED SCIATICA: Primary | ICD-10-CM

## 2025-08-17 PROCEDURE — 99213 OFFICE O/P EST LOW 20 MIN: CPT

## 2025-08-17 PROCEDURE — RXMED WILLOW AMBULATORY MEDICATION CHARGE

## 2025-08-17 PROCEDURE — 3078F DIAST BP <80 MM HG: CPT

## 2025-08-17 PROCEDURE — 3074F SYST BP LT 130 MM HG: CPT

## 2025-08-17 RX ORDER — LIDOCAINE 50 MG/G
1 PATCH TOPICAL EVERY 24 HOURS
Qty: 15 PATCH | Refills: 0 | Status: SHIPPED | OUTPATIENT
Start: 2025-08-17

## 2025-08-17 RX ORDER — CYCLOBENZAPRINE HCL 5 MG
5-10 TABLET ORAL
Qty: 15 TABLET | Refills: 0 | Status: SHIPPED | OUTPATIENT
Start: 2025-08-17

## 2025-08-17 RX ORDER — IBUPROFEN 600 MG/1
600 TABLET, FILM COATED ORAL EVERY 8 HOURS PRN
Qty: 30 TABLET | Refills: 0 | Status: SHIPPED | OUTPATIENT
Start: 2025-08-17

## 2025-08-17 RX ORDER — ACETAMINOPHEN 500 MG
500-1000 TABLET ORAL EVERY 8 HOURS PRN
Qty: 30 TABLET | Refills: 0 | Status: SHIPPED | OUTPATIENT
Start: 2025-08-17

## 2025-08-17 ASSESSMENT — ENCOUNTER SYMPTOMS
FALLS: 0
LEG PAIN: 1
NECK PAIN: 0
MYALGIAS: 0
HEADACHES: 0
LOSS OF CONSCIOUSNESS: 0
TINGLING: 0
CONSTIPATION: 0
WEAKNESS: 0
BACK PAIN: 1
FOCAL WEAKNESS: 0
DIARRHEA: 0

## 2025-08-17 ASSESSMENT — LIFESTYLE VARIABLES: HOW OFTEN DO YOU HAVE A DRINK CONTAINING ALCOHOL: MONTHLY OR LESS

## 2025-08-17 ASSESSMENT — FIBROSIS 4 INDEX: FIB4 SCORE: 0.57

## 2025-08-21 ENCOUNTER — PHARMACY VISIT (OUTPATIENT)
Dept: PHARMACY | Facility: MEDICAL CENTER | Age: 30
End: 2025-08-21
Payer: COMMERCIAL

## 2025-08-21 ENCOUNTER — HOSPITAL ENCOUNTER (EMERGENCY)
Facility: MEDICAL CENTER | Age: 30
End: 2025-08-21
Attending: EMERGENCY MEDICINE
Payer: COMMERCIAL

## 2025-08-21 VITALS
HEART RATE: 100 BPM | HEIGHT: 62 IN | OXYGEN SATURATION: 98 % | WEIGHT: 214.73 LBS | SYSTOLIC BLOOD PRESSURE: 125 MMHG | RESPIRATION RATE: 18 BRPM | TEMPERATURE: 96.8 F | DIASTOLIC BLOOD PRESSURE: 72 MMHG | BODY MASS INDEX: 39.51 KG/M2

## 2025-08-21 DIAGNOSIS — K04.7 DENTAL INFECTION: Primary | ICD-10-CM

## 2025-08-21 PROCEDURE — 99282 EMERGENCY DEPT VISIT SF MDM: CPT

## 2025-08-21 PROCEDURE — RXMED WILLOW AMBULATORY MEDICATION CHARGE: Performed by: EMERGENCY MEDICINE

## 2025-08-21 RX ORDER — ONDANSETRON 4 MG/1
4 TABLET, ORALLY DISINTEGRATING ORAL EVERY 6 HOURS PRN
Qty: 16 TABLET | Refills: 0 | Status: SHIPPED | OUTPATIENT
Start: 2025-08-21 | End: 2025-08-29

## 2025-08-21 RX ORDER — CHLORHEXIDINE GLUCONATE ORAL RINSE 1.2 MG/ML
15 SOLUTION DENTAL 2 TIMES DAILY
Qty: 473 ML | Refills: 0 | Status: SHIPPED | OUTPATIENT
Start: 2025-08-21 | End: 2025-09-06

## 2025-08-21 RX ORDER — AMOXICILLIN 500 MG/1
500 CAPSULE ORAL 3 TIMES DAILY
Qty: 21 CAPSULE | Refills: 0 | Status: ACTIVE | OUTPATIENT
Start: 2025-08-21 | End: 2025-08-28

## 2025-08-21 RX ORDER — GABAPENTIN 100 MG/1
100 CAPSULE ORAL 3 TIMES DAILY PRN
Qty: 10 CAPSULE | Refills: 0 | Status: SHIPPED | OUTPATIENT
Start: 2025-08-21 | End: 2025-08-28

## 2025-08-21 ASSESSMENT — FIBROSIS 4 INDEX: FIB4 SCORE: 0.57
